# Patient Record
Sex: MALE | Race: WHITE | Employment: OTHER | URBAN - METROPOLITAN AREA
[De-identification: names, ages, dates, MRNs, and addresses within clinical notes are randomized per-mention and may not be internally consistent; named-entity substitution may affect disease eponyms.]

---

## 2017-03-06 ENCOUNTER — OFFICE VISIT (OUTPATIENT)
Dept: FAMILY MEDICINE CLINIC | Age: 62
End: 2017-03-06

## 2017-03-06 ENCOUNTER — HOSPITAL ENCOUNTER (OUTPATIENT)
Dept: LAB | Age: 62
Discharge: HOME | End: 2017-03-06
Payer: OTHER GOVERNMENT

## 2017-03-06 VITALS
HEIGHT: 72 IN | HEART RATE: 70 BPM | OXYGEN SATURATION: 99 % | WEIGHT: 209 LBS | RESPIRATION RATE: 16 BRPM | TEMPERATURE: 98.4 F | DIASTOLIC BLOOD PRESSURE: 82 MMHG | BODY MASS INDEX: 28.31 KG/M2 | SYSTOLIC BLOOD PRESSURE: 132 MMHG

## 2017-03-06 DIAGNOSIS — E11.9 DIABETES MELLITUS WITHOUT COMPLICATION (HCC): ICD-10-CM

## 2017-03-06 DIAGNOSIS — Z23 ENCOUNTER FOR IMMUNIZATION: ICD-10-CM

## 2017-03-06 DIAGNOSIS — Z13.6 SCREENING FOR CARDIOVASCULAR CONDITION: ICD-10-CM

## 2017-03-06 DIAGNOSIS — E11.9 DIABETES MELLITUS WITHOUT COMPLICATION (HCC): Primary | ICD-10-CM

## 2017-03-06 DIAGNOSIS — Z00.00 ROUTINE MEDICAL EXAM: Primary | ICD-10-CM

## 2017-03-06 LAB
ALT SERPL-CCNC: 25 U/L (ref 16–61)
ANION GAP BLD CALC-SCNC: 8 MMOL/L (ref 3–18)
AST SERPL W P-5'-P-CCNC: 14 U/L (ref 15–37)
BUN SERPL-MCNC: 15 MG/DL (ref 7–18)
BUN/CREAT SERPL: 16 (ref 12–20)
CALCIUM SERPL-MCNC: 9.3 MG/DL (ref 8.5–10.1)
CHLORIDE SERPL-SCNC: 102 MMOL/L (ref 100–108)
CHOLEST SERPL-MCNC: 135 MG/DL
CO2 SERPL-SCNC: 26 MMOL/L (ref 21–32)
CREAT SERPL-MCNC: 0.93 MG/DL (ref 0.6–1.3)
EST. AVERAGE GLUCOSE BLD GHB EST-MCNC: 163 MG/DL
GLUCOSE SERPL-MCNC: 136 MG/DL (ref 74–99)
HBA1C MFR BLD: 7.3 % (ref 4.2–5.6)
HDLC SERPL-MCNC: 44 MG/DL (ref 40–60)
HDLC SERPL: 3.1 {RATIO} (ref 0–5)
LDLC SERPL CALC-MCNC: 63.8 MG/DL (ref 0–100)
LIPID PROFILE,FLP: NORMAL
POTASSIUM SERPL-SCNC: 4.3 MMOL/L (ref 3.5–5.5)
SODIUM SERPL-SCNC: 136 MMOL/L (ref 136–145)
TRIGL SERPL-MCNC: 136 MG/DL (ref ?–150)
VLDLC SERPL CALC-MCNC: 27.2 MG/DL

## 2017-03-06 PROCEDURE — 83036 HEMOGLOBIN GLYCOSYLATED A1C: CPT | Performed by: FAMILY MEDICINE

## 2017-03-06 PROCEDURE — 36415 COLL VENOUS BLD VENIPUNCTURE: CPT | Performed by: FAMILY MEDICINE

## 2017-03-06 PROCEDURE — 80048 BASIC METABOLIC PNL TOTAL CA: CPT | Performed by: FAMILY MEDICINE

## 2017-03-06 PROCEDURE — 84460 ALANINE AMINO (ALT) (SGPT): CPT | Performed by: FAMILY MEDICINE

## 2017-03-06 PROCEDURE — 80061 LIPID PANEL: CPT | Performed by: FAMILY MEDICINE

## 2017-03-06 PROCEDURE — 82043 UR ALBUMIN QUANTITATIVE: CPT | Performed by: FAMILY MEDICINE

## 2017-03-06 PROCEDURE — 84450 TRANSFERASE (AST) (SGOT): CPT | Performed by: FAMILY MEDICINE

## 2017-03-06 RX ORDER — DIAZEPAM 5 MG/1
TABLET ORAL
Qty: 60 TAB | Refills: 1 | Status: SHIPPED | OUTPATIENT
Start: 2017-03-06 | End: 2018-11-06 | Stop reason: SDUPTHER

## 2017-03-06 RX ORDER — LISINOPRIL 5 MG/1
5 TABLET ORAL DAILY
Qty: 90 TAB | Refills: 3 | Status: SHIPPED | COMMUNITY
Start: 2017-03-06 | End: 2018-02-23 | Stop reason: SDUPTHER

## 2017-03-06 NOTE — PROGRESS NOTES
Subjective:     Kandy Sandhu is a 63 y.o. male presenting for annual exam and complete physical.  Pt is fasting today    Pt has been feeling well; He has lost 10 pounds since last visit. He has not been exercising. He has cut down on the breads; Blood sugars have been stable in am. 110- 120s mostly; Has not had a gout attack in awhile    Wt Readings from Last 3 Encounters:   03/06/17 209 lb (94.8 kg)   11/21/16 219 lb (99.3 kg)   08/22/16 225 lb (102.1 kg)         Lab Results   Component Value Date/Time    Hemoglobin A1c 7.5 11/21/2016 08:45 AM    Hemoglobin A1c (POC) 6.4 04/21/2015 08:53 AM         Patient Active Problem List   Diagnosis Code    Diverticulosis K57.90    BPH (benign prostatic hyperplasia) N40.0    Right shoulder pain M25.511    DM (diabetes mellitus) ([facility] E11.9    DDD (degenerative disc disease), lumbar M51.36    Pseudogout M11.20    Family history of prostate cancer in father Z80.41    Colon cancer screening Z12.11    History of colon polyps Z86.010    Rectal polyp K62.1     Current Outpatient Prescriptions   Medication Sig Dispense Refill    simvastatin (ZOCOR) 20 mg tablet TAKE 1 TABLET NIGHTLY 90 Tab 1    JANUVIA 100 mg tablet TAKE 1 TABLET DAILY 90 Tab 2    diazePAM (VALIUM) 5 mg tablet TAKE ONE TABLET BY MOUTH EVERY 12 HOURS AS NEEDED 60 Tab 1    lisinopril (PRINIVIL, ZESTRIL) 5 mg tablet Take 1 Tab by mouth daily. (Patient taking differently: Take 5 mg by mouth daily. Pt taking QOD) 90 Tab 3    metFORMIN (GLUCOPHAGE) 500 mg tablet TAKE 2 TABLETS IN THE MORNING AND 2 TABLETS IN THE EVENING 360 Tab 3    glucose blood VI test strips (ASCENSIA AUTODISC VI, ONE TOUCH ULTRA TEST VI) strip Blood sugar check daily 100 Strip 6    colchicine 0.6 mg tablet Take 2 tablets X 1 at onset of pain.   May repeat 1 tab in one hour prn pain X1 30 Tab 0     No Known Allergies  Past Medical History:   Diagnosis Date    BPH (benign prostatic hyperplasia) 12/9/2009    DDD (degenerative disc disease), lumbar 12/9/2009    Diverticulosis 12/9/2009    DM (diabetes mellitus) ([facility] [address] 12/9/2009    History of colon polyps 4/5/2016    Hypercholesterolemia     Pseudogout 12/9/2009    Renal cell carcinoma of left kidney ([facility] [address] 6/19/15    Pathological Stage [id number] pap RCC FG2 s/p left open partial nephrectomy in 6/15     Right shoulder pain 12/9/2009     Past Surgical History:   Procedure Laterality Date    ENDOSCOPY, COLON, DIAGNOSTIC  2000    sigmoidectomy    HX NEPHRECTOMY Left 6/19/15    Partial, [facility]    HX ORTHOPAEDIC  2005    left rotator cuff    HX OTHER SURGICAL  2007    hydrocele    HX TONSILLECTOMY  1978     Family History   Problem Relation Age of Onset    Diabetes Mother     Dementia Maternal Grandmother    Laureano Guilloryheim Brother         Lab Results  Component Value Date/Time   WBC 7.8 07/28/2015 07:45 AM   HGB 13.2 07/28/2015 07:45 AM   HCT 39.7 07/28/2015 07:45 AM   PLATELET 468 85/07/7327 07:45 AM   MCV 95 07/28/2015 07:45 AM       Lab Results  Component Value Date/Time   Cholesterol, total 135 11/21/2016 08:45 AM   HDL Cholesterol 51 11/21/2016 08:45 AM   LDL, calculated 60.2 11/21/2016 08:45 AM   Triglyceride 119 11/21/2016 08:45 AM   CHOL/HDL Ratio 2.6 11/21/2016 08:45 AM       Lab Results  Component Value Date/Time   ALT (SGPT) 28 11/21/2016 08:45 AM   AST (SGOT) 15 11/21/2016 08:45 AM   Alk.  phosphatase 71 02/20/2015 11:15 PM   Bilirubin, total 0.3 02/20/2015 11:15 PM       Lab Results  Component Value Date/Time   GFR est AA >60 11/21/2016 08:45 AM   GFR est non-AA >60 11/21/2016 08:45 AM   Creatinine (POC) 0.9 03/16/2015 07:23 AM   Creatinine 0.91 11/21/2016 08:45 AM   BUN 13 11/21/2016 08:45 AM   Sodium 141 11/21/2016 08:45 AM   Potassium 4.1 11/21/2016 08:45 AM   Chloride 106 11/21/2016 08:45 AM   CO2 26 11/21/2016 08:45 AM      Lab Results  Component Value Date/Time   Prostate Specific Ag 0.7 05/13/2010 08:36 AM        Review of Systems  A comprehensive review of systems was negative except for that written in the HPI. Objective:     Visit Vitals    /82 (BP 1 Location: Left arm, BP Patient Position: Sitting)    Pulse 70    Temp 98.4 °F (36.9 °C) (Oral)    Resp 16    Ht 6' (1.829 m)    Wt 209 lb (94.8 kg)    SpO2 99%    BMI 28.35 kg/m2     Physical exam:   General appearance - alert, well appearing, and in no distress  Ears - bilateral TM's and external ear canals normal  Nose - normal and patent, no erythema, discharge or polyps  Mouth - mucous membranes moist, pharynx normal without lesions  Neck - supple, no significant adenopathy  Lymphatics - no palpable lymphadenopathy, no hepatosplenomegaly  Chest - clear to auscultation, no wheezes, rales or rhonchi, symmetric air entry  Heart - normal rate, regular rhythm, normal S1, S2, no murmurs, rubs, clicks or gallops  Abdomen - soft, nontender, nondistended, no masses or organomegaly  Musculoskeletal - no joint tenderness, deformity or swelling  Extremities - no pedal edema noted     Assessment/Plan:     .  increase physical activity, follow low fat diet, follow low salt diet, continue present plan, routine labs ordered. ICD-10-CM ICD-9-CM    1. Routine medical exam Z00.00 V70.0    2. Encounter for immunization Z23 V03.89 PNEUMOCOCCAL CONJ VACCINE 13 VALENT IM   3. Diabetes mellitus without complication ([facility] [address] ) - for lab order only E11.9 250.00 ALT      AST      LIPID PANEL      METABOLIC PANEL, BASIC      HEMOGLOBIN A1C WITH EAG      MICROALBUMIN, UR, RAND W/ MICROALBUMIN/CREA RATIO   4. Screening for cardiovascular condition-  Z13.6 V81.2 LIPID PANEL      METABOLIC PANEL, BASIC   . As above, pt well and stable  Praised on weight loss efforts  Follow-up Disposition:  Return in about 4 months (around 7/6/2017) for chol/dm/htn. An After Visit Summary was printed and given to the patient. This has been fully explained to the patient, who indicates understanding.   Future labs to be placed in patients record per his request

## 2017-03-06 NOTE — PATIENT INSTRUCTIONS

## 2017-03-06 NOTE — PROGRESS NOTES
1. Have you been to the ER, urgent care clinic since your last visit? Hospitalized since your last visit? Yes Where: [facility]    2. Have you seen or consulted any other health care providers outside of the [address] [place] [address] since your last visit? Include any pap smears or colon screening.  [facility] PA with Dr. Ramesh Garcia, [address] [place] [address]

## 2017-03-06 NOTE — MR AVS SNAPSHOT
Visit Information Date & Time Provider Department Dept. Phone Encounter #  
 3/6/2017  9:20 AM Shoshana Islas, [address] [id number] Follow-up Instructions Return in about 4 months (around 7/6/2017) for chol/dm/htn. Upcoming Health Maintenance Date Due ZOSTER VACCINE AGE 60> 8/23/2017* HEMOGLOBIN A1C Q6M 5/21/2017 FOOT EXAM Q1 7/11/2017 MICROALBUMIN Q1 8/22/2017 EYE EXAM RETINAL OR DILATED Q1 11/1/2017 LIPID PANEL Q1 11/21/2017 Pneumococcal 19-64 Highest Risk (2 of 3 - PCV13) 3/6/2018 DTaP/Tdap/Td series (2 - Td) 3/15/2021 COLONOSCOPY 4/8/2026 *Topic was postponed. The date shown is not the original due date. Allergies as of 3/6/2017  Review Complete On: 3/6/2017 By: Shoshana Islas MD  
 No Known Allergies Current Immunizations  Reviewed on 3/6/2017 Name Date Pneumococcal Conjugate (PCV-13)  Incomplete TDAP Vaccine 3/15/2011 Reviewed by Shoshana Islas MD on 3/6/2017 at  9:35 AM  
You Were Diagnosed With   
  
 Codes Comments Routine medical exam    -  Primary ICD-10-CM: Z00.00 ICD-9-CM: V70.0 Encounter for immunization     ICD-10-CM: K37 ICD-9-CM: V03.89 Diabetes mellitus without complication ([facility]     JWT-90-LL: E11.9 ICD-9-CM: 250.00 Screening for cardiovascular condition     ICD-10-CM: Z13.6 ICD-9-CM: V81.2 Vitals BP Pulse Temp Resp Height(growth percentile) Weight(growth percentile) 132/82 (BP 1 Location: Left arm, BP Patient Position: Sitting) 70 98.4 °F (36.9 °C) (Oral) 16 6' (1.829 m) 209 lb (94.8 kg) SpO2 BMI Smoking Status 99% 28.35 kg/m2 Former Smoker BMI and BSA Data Body Mass Index Body Surface Area  
 28.35 kg/m 2 2.19 m 2 Preferred Pharmacy Pharmacy Name Phone [address] [facility] [phone] Your Updated Medication List  
  
   
 This list is accurate as of: 3/6/17  9:53 AM.  Always use your most recent med list.  
  
  
  
  
 colchicine 0.6 mg tablet Take 2 tablets X 1 at onset of pain. May repeat 1 tab in one hour prn pain X1  
  
 diazePAM 5 mg tablet Commonly known as:  VALIUM  
TAKE ONE TABLET BY MOUTH EVERY 12 HOURS AS NEEDED  
  
 glucose blood VI test strips strip Commonly known as:  ASCENSIA AUTODISC VI, ONE TOUCH ULTRA TEST VI Blood sugar check daily JANUVIA 100 mg tablet Generic drug:  SITagliptin TAKE 1 TABLET DAILY  
  
 lisinopril 5 mg tablet Commonly known as:  [place] Take 1 Tab by mouth daily. metFORMIN 500 mg tablet Commonly known as:  GLUCOPHAGE  
TAKE 2 TABLETS IN THE MORNING AND 2 TABLETS IN THE EVENING  
  
 simvastatin 20 mg tablet Commonly known as:  ZOCOR  
TAKE 1 TABLET NIGHTLY Prescriptions Printed Refills  
 diazePAM (VALIUM) 5 mg tablet 1 Sig: TAKE ONE TABLET BY MOUTH EVERY 12 HOURS AS NEEDED Class: Print Prescriptions Sent to Mail Order Refills  
 lisinopril (PRINIVIL, ZESTRIL) 5 mg tablet 3 Sig: Take 1 Tab by mouth daily. Class: Mail Order Pharmacy: [address], [address] Ph #: [phone] Route: Oral  
  
We Performed the Following PNEUMOCOCCAL CONJ VACCINE 13 VALENT IM [id number] CPT(R)] Follow-up Instructions Return in about 4 months (around 7/6/2017) for chol/dm/htn. To-Do List   
 03/06/2017 Lab:  ALT   
  
 03/06/2017 Lab:  AST   
  
 03/06/2017 Lab:  HEMOGLOBIN A1C WITH EAG   
  
 03/06/2017 Lab:  LIPID PANEL   
  
 03/06/2017 Lab:  METABOLIC PANEL, BASIC   
  
 03/06/2017 Lab:  MICROALBUMIN, UR, RAND W/ MICROALBUMIN/CREA RATIO Patient Instructions Well Visit, Men 48 to 72: Care Instructions Your Care Instructions Physical exams can help you stay healthy.  Your doctor has checked your overall health and may have suggested ways to take good care of yourself. He or she also may have recommended tests. At home, you can help prevent illness with healthy eating, regular exercise, and other steps. Follow-up care is a key part of your treatment and safety. Be sure to make and go to all appointments, and call your doctor if you are having problems. It's also a good idea to know your test results and keep a list of the medicines you take. How can you care for yourself at home? · Reach and stay at a healthy weight. This will lower your risk for many problems, such as obesity, diabetes, heart disease, and high blood pressure. · Get at least 30 minutes of exercise on most days of the week. Walking is a good choice. You also may want to do other activities, such as running, swimming, cycling, or playing tennis or team sports. · Do not smoke. Smoking can make health problems worse. If you need help quitting, talk to your doctor about stop-smoking programs and medicines. These can increase your chances of quitting for good. · Protect your skin from too much sun. When you're outdoors from 10 a.m. to 4 p.m., stay in the shade or cover up with clothing and a hat with a wide brim. Wear sunglasses that block UV rays. Even when it's cloudy, put broad-spectrum sunscreen (SPF 30 or higher) on any exposed skin. · See a dentist one or two times a year for checkups and to have your teeth cleaned. · Wear a seat belt in the car. · Limit alcohol to 2 drinks a day. Too much alcohol can cause health problems. Follow your doctor's advice about when to have certain tests. These tests can spot problems early. · Cholesterol. Your doctor will tell you how often to have this done based on your overall health and other things that can increase your risk for heart attack and stroke. · Blood pressure.  Have your blood pressure checked during a routine doctor visit. Your doctor will tell you how often to check your blood pressure based on your age, your blood pressure results, and other factors. · Prostate exam. Talk to your doctor about whether you should have a blood test (called a PSA test) for prostate cancer. Experts disagree on whether men should have this test. Some experts recommend that you discuss the benefits and risks of the test with your doctor. · Diabetes. Ask your doctor whether you should have tests for diabetes. · Vision. Some experts recommend that you have yearly exams for glaucoma and other age-related eye problems starting at age 48. · Hearing. Tell your doctor if you notice any change in your hearing. You can have tests to find out how well you hear. · Colon cancer. You should begin tests for colon cancer at age 48. You may have one of several tests. Your doctor will tell you how often to have tests based on your age and risk. Risks include whether you already had a precancerous polyp removed from your colon or whether your parent, brother, sister, or child has had colon cancer. · Heart attack and stroke risk. At least every 4 to 6 years, you should have your risk for heart attack and stroke assessed. Your doctor uses factors such as your age, blood pressure, cholesterol, and whether you smoke or have diabetes to show what your risk for a heart attack or stroke is over the next 10 years. · Abdominal aortic aneurysm. Ask your doctor whether you should have a test to check for an aneurysm. You may need a test if you ever smoked or if your parent, brother, sister, or child has had an aneurysm. When should you call for help? Watch closely for changes in your health, and be sure to contact your doctor if you have any problems or symptoms that concern you. Where can you learn more? Go to http://eddie-dion.info/. Enter D765 in the search box to learn more about \"Well Visit, Men 48 to 72: Care Instructions. \" 
 Current as of: July 19, 2016 Content Version: 11.1 © 6621-5124 [employer] [place]. Care instructions adapted under license by [employer] (which disclaims liability or warranty for this information). If you have questions about a medical condition or this instruction, always ask your healthcare professional. [address] any warranty or liability for your use of this information. Introducing [facility]! Dear Joanna Schumacher: Thank you for requesting a "[employer] account. Our records indicate that you already have an active "[employer] account. You can access your account anytime at [email or web address] Did you know that you can access your hospital and ER discharge instructions at any time in "[employer]? You can also review all of your test results from your hospital stay or ER visit. Additional Information If you have questions, please visit the Frequently Asked Questions section of the "[employer] website at [email or web address] Remember, "[employer] is NOT to be used for urgent needs. For medical emergencies, dial 911. Now available from your iPhone and Android! Please provide this summary of care documentation to your next provider. Your primary care clinician is listed as [address]. If you have any questions after today's visit, please call [phone].

## 2017-03-07 LAB
CREAT UR-MCNC: 142.61 MG/DL (ref 30–125)
MICROALBUMIN UR-MCNC: 10.3 MG/DL (ref 0–3)
MICROALBUMIN/CREAT UR-RTO: 72 MG/G (ref 0–30)

## 2017-04-17 RX ORDER — METFORMIN HYDROCHLORIDE 500 MG/1
TABLET ORAL
Qty: 360 TAB | Refills: 2 | Status: SHIPPED | OUTPATIENT
Start: 2017-04-17 | End: 2018-03-22 | Stop reason: SDUPTHER

## 2017-05-10 ENCOUNTER — OFFICE VISIT (OUTPATIENT)
Dept: FAMILY MEDICINE CLINIC | Age: 62
End: 2017-05-10

## 2017-05-10 VITALS
WEIGHT: 212 LBS | OXYGEN SATURATION: 98 % | TEMPERATURE: 98.2 F | HEIGHT: 72 IN | HEART RATE: 71 BPM | BODY MASS INDEX: 28.71 KG/M2 | DIASTOLIC BLOOD PRESSURE: 78 MMHG | SYSTOLIC BLOOD PRESSURE: 134 MMHG | RESPIRATION RATE: 16 BRPM

## 2017-05-10 DIAGNOSIS — R10.84 GENERALIZED ABDOMINAL PAIN: Primary | ICD-10-CM

## 2017-05-10 LAB
BILIRUB UR QL STRIP: NEGATIVE
GLUCOSE UR-MCNC: NEGATIVE MG/DL
KETONES P FAST UR STRIP-MCNC: NEGATIVE MG/DL
PH UR STRIP: 7 [PH] (ref 4.6–8)
PROT UR QL STRIP: NORMAL MG/DL
SP GR UR STRIP: 1.02 (ref 1–1.03)
UA UROBILINOGEN AMB POC: NORMAL (ref 0.2–1)
URINALYSIS CLARITY POC: CLEAR
URINALYSIS COLOR POC: NORMAL
URINE BLOOD POC: NEGATIVE
URINE LEUKOCYTES POC: NEGATIVE
URINE NITRITES POC: NEGATIVE

## 2017-05-10 RX ORDER — AMOXICILLIN AND CLAVULANATE POTASSIUM 875; 125 MG/1; MG/1
1 TABLET, FILM COATED ORAL EVERY 12 HOURS
Qty: 20 TAB | Refills: 0 | Status: SHIPPED | OUTPATIENT
Start: 2017-05-10 | End: 2017-05-20

## 2017-05-10 RX ORDER — PHENOL/SODIUM PHENOLATE
20 AEROSOL, SPRAY (ML) MUCOUS MEMBRANE DAILY
Qty: 30 TAB | Refills: 0 | Status: SHIPPED | OUTPATIENT
Start: 2017-05-10 | End: 2017-08-02 | Stop reason: ALTCHOICE

## 2017-05-10 NOTE — PROGRESS NOTES
HISTORY OF PRESENT ILLNESS  Oleta Boast is a 63 y.o. male. HPI  Patient is here today for evaluation and treatment of: Abdominal Pain    Abdominal Pain: states Sunday night pt began getting some crampy, abdominal pain and later some fecal urgency with diarrhea , vomiting,  dry heaving. Awakened the next day and sx got better at noon. The last two days have been stable up until last night. Last night he developed lower abdominal pain; with severe cramps. Pt had eaten cashews and triscuits before going to bed. Has not had any blood in stool; No diarrhea last night. No NSAIDS. Denies any fever. Has a h/o diverticulosis; Had a portion of his colon removed from the diverticulosis. He ate chipotle Saturday night. Ate about a cupful of the same a few days later. States pain seems to radiate down into the testicles as well. PMH,  Meds, Allergies, Family History, Social history reviewed      Current Outpatient Prescriptions:     metFORMIN (GLUCOPHAGE) 500 mg tablet, TAKE 2 TABLETS IN THE MORNING AND 2 TABLETS IN THE EVENING, Disp: 360 Tab, Rfl: 2    diazePAM (VALIUM) 5 mg tablet, TAKE ONE TABLET BY MOUTH EVERY 12 HOURS AS NEEDED, Disp: 60 Tab, Rfl: 1    lisinopril (PRINIVIL, ZESTRIL) 5 mg tablet, Take 1 Tab by mouth daily. , Disp: 90 Tab, Rfl: 3    simvastatin (ZOCOR) 20 mg tablet, TAKE 1 TABLET NIGHTLY, Disp: 90 Tab, Rfl: 1    JANUVIA 100 mg tablet, TAKE 1 TABLET DAILY, Disp: 90 Tab, Rfl: 2    glucose blood VI test strips (ASCENSIA AUTODISC VI, ONE TOUCH ULTRA TEST VI) strip, Blood sugar check daily, Disp: 100 Strip, Rfl: 6    colchicine 0.6 mg tablet, Take 2 tablets X 1 at onset of pain. May repeat 1 tab in one hour prn pain X1, Disp: 30 Tab, Rfl: 0    PMH,  Meds, Allergies, Family History, Social history reviewed    Review of Systems   Constitutional: Negative for chills and fever. Gastrointestinal: Positive for abdominal pain, diarrhea, nausea (when he vomits) and vomiting.  Negative for blood in stool, constipation and heartburn. Genitourinary: Negative for dysuria and urgency. Physical Exam   Constitutional: He appears well-developed and well-nourished. No distress. Cardiovascular: Normal rate and regular rhythm. Exam reveals no gallop and no friction rub. No murmur heard. Pulmonary/Chest: Breath sounds normal. No respiratory distress. He has no wheezes. He has no rales. Abdominal: Bowel sounds are normal. He exhibits no distension and no mass. There is tenderness (diffusely with more tenderness noted in left mid abdomen and mid epigastrium). There is no rebound and no guarding. Nursing note and vitals reviewed. (with Nurse standby):  No testicular TTP  Visit Vitals    /78 (BP 1 Location: Left arm, BP Patient Position: Sitting)    Pulse 71    Temp 98.2 °F (36.8 °C) (Oral)    Resp 16    Ht 6' (1.829 m)    Wt 212 lb (96.2 kg)    SpO2 98%    BMI 28.75 kg/m2       UA no nitrites, no jaun    ASSESSMENT and PLAN    ICD-10-CM ICD-9-CM    1. Generalized abdominal pain R10.84 789.07 AMB POC URINALYSIS DIP STICK AUTO W/ MICRO       As above, new? Etiology- ? Diverticular in origin vs food related   treatment plan as listed below  Orders Placed This Encounter    AMB POC URINALYSIS DIP STICK AUTO W/ MICRO    Omeprazole delayed release (PRILOSEC D/R) 20 mg tablet    amoxicillin-clavulanate (AUGMENTIN) 875-125 mg per tablet     [place] diet; consider not eating anymore of his leftover chipotle  Plenty of liquids  Follow-up Disposition:  Return July as scheduled. Return sooner if sx do not improve  An After Visit Summary was printed and given to the patient. This has been fully explained to the patient, who indicates understanding.

## 2017-05-10 NOTE — MR AVS SNAPSHOT
Visit Information Date & Time Provider Department Dept. Phone Encounter #  
 5/10/2017  9:40 AM Brendan Walton  [address] [id number] Your Appointments 7/6/2017  8:20 AM  
ROUTINE CARE with Brendan Walton MD  
[address] ([facility]) Appt Note: 4m fu chol/ dm  
 [address] [address] 2 Leobardo Ajo [address] [address] Upcoming Health Maintenance Date Due Pneumococcal 19-64 Medium Risk (1 of 1 - PPSV23) 2/3/1976 ZOSTER VACCINE AGE 60> 8/23/2017* FOOT EXAM Q1 7/11/2017 INFLUENZA AGE 9 TO ADULT 8/1/2017 HEMOGLOBIN A1C Q6M 9/6/2017 EYE EXAM RETINAL OR DILATED Q1 11/1/2017 MICROALBUMIN Q1 3/6/2018 LIPID PANEL Q1 3/6/2018 DTaP/Tdap/Td series (2 - Td) 3/15/2021 COLONOSCOPY 4/8/2026 *Topic was postponed. The date shown is not the original due date. Allergies as of 5/10/2017  Review Complete On: 5/10/2017 By: Yadi Macias LPN No Known Allergies Current Immunizations  Reviewed on 3/6/2017 Name Date Pneumococcal Conjugate (PCV-13) 3/6/2017 TDAP Vaccine 3/15/2011 Not reviewed this visit You Were Diagnosed With   
  
 Codes Comments Generalized abdominal pain    -  Primary ICD-10-CM: R10.84 ICD-9-CM: 789.07 Vitals BP Pulse Temp Resp Height(growth percentile) Weight(growth percentile) 134/78 (BP 1 Location: Left arm, BP Patient Position: Sitting) 71 98.2 °F (36.8 °C) (Oral) 16 6' (1.829 m) 212 lb (96.2 kg) SpO2 BMI Smoking Status 98% 28.75 kg/m2 Former Smoker BMI and BSA Data Body Mass Index Body Surface Area 28.75 kg/m 2 2.21 m 2 Preferred Pharmacy Pharmacy Name Phone [facility] [address], [address] [phone] Your Updated Medication List  
  
   
 This list is accurate as of: 5/10/17 11:07 AM.  Always use your most recent med list.  
  
  
  
  
 amoxicillin-clavulanate 875-125 mg per tablet Commonly known as:  AUGMENTIN Take 1 Tab by mouth every twelve (12) hours for 10 days. colchicine 0.6 mg tablet Take 2 tablets X 1 at onset of pain. May repeat 1 tab in one hour prn pain X1  
  
 diazePAM 5 mg tablet Commonly known as:  VALIUM  
TAKE ONE TABLET BY MOUTH EVERY 12 HOURS AS NEEDED  
  
 glucose blood VI test strips strip Commonly known as:  ASCENSIA AUTODISC VI, ONE TOUCH ULTRA TEST VI Blood sugar check daily JANUVIA 100 mg tablet Generic drug:  SITagliptin TAKE 1 TABLET DAILY  
  
 lisinopril 5 mg tablet Commonly known as:  Fiordaliza Humphrey Take 1 Tab by mouth daily. metFORMIN 500 mg tablet Commonly known as:  GLUCOPHAGE  
TAKE 2 TABLETS IN THE MORNING AND 2 TABLETS IN THE EVENING Omeprazole delayed release 20 mg tablet Commonly known as:  PRILOSEC D/R Take 1 Tab by mouth daily. simvastatin 20 mg tablet Commonly known as:  ZOCOR  
TAKE 1 TABLET NIGHTLY Prescriptions Sent to Pharmacy Refills Omeprazole delayed release (PRILOSEC D/R) 20 mg tablet 0 Sig: Take 1 Tab by mouth daily. Class: Normal  
 Pharmacy: [address] Ph #: [phone] Route: Oral  
 amoxicillin-clavulanate (AUGMENTIN) 875-125 mg per tablet 0 Sig: Take 1 Tab by mouth every twelve (12) hours for 10 days. Class: Normal  
 Pharmacy: [address] Ph #: [phone] Route: Oral  
  
We Performed the Following AMB POC URINALYSIS DIP STICK AUTO W/ MICRO [46314 CPT(R)] Patient Instructions Abdominal Pain: Care Instructions Your Care Instructions Abdominal pain has many possible causes.  Some aren't serious and get better on their own in a few days. Others need more testing and treatment. If your pain continues or gets worse, you need to be rechecked and may need more tests to find out what is wrong. You may need surgery to correct the problem. Don't ignore new symptoms, such as fever, nausea and vomiting, urination problems, pain that gets worse, and dizziness. These may be signs of a more serious problem. Your doctor may have recommended a follow-up visit in the next 8 to 12 hours. If you are not getting better, you may need more tests or treatment. The doctor has checked you carefully, but problems can develop later. If you notice any problems or new symptoms, get medical treatment right away. Follow-up care is a key part of your treatment and safety. Be sure to make and go to all appointments, and call your doctor if you are having problems. It's also a good idea to know your test results and keep a list of the medicines you take. How can you care for yourself at home? · Rest until you feel better. · To prevent dehydration, drink plenty of fluids, enough so that your urine is light yellow or clear like water. Choose water and other caffeine-free clear liquids until you feel better. If you have kidney, heart, or liver disease and have to limit fluids, talk with your doctor before you increase the amount of fluids you drink. · If your stomach is upset, eat mild foods, such as rice, dry toast or crackers, bananas, and applesauce. Try eating several small meals instead of two or three large ones. · Wait until 48 hours after all symptoms have gone away before you have spicy foods, alcohol, and drinks that contain caffeine. · Do not eat foods that are high in fat. · Avoid anti-inflammatory medicines such as aspirin, ibuprofen (Advil, Motrin), and naproxen (Aleve). These can cause stomach upset. Talk to your doctor if you take daily aspirin for another health problem. When should you call for help? Call 911 anytime you think you may need emergency care. For example, call if: 
· You passed out (lost consciousness). · You pass maroon or very bloody stools. · You vomit blood or what looks like coffee grounds. · You have new, severe belly pain. Call your doctor now or seek immediate medical care if: 
· Your pain gets worse, especially if it becomes focused in one area of your belly. · You have a new or higher fever. · Your stools are black and look like tar, or they have streaks of blood. · You have unexpected vaginal bleeding. · You have symptoms of a urinary tract infection. These may include: 
¨ Pain when you urinate. ¨ Urinating more often than usual. 
¨ Blood in your urine. · You are dizzy or lightheaded, or you feel like you may faint. Watch closely for changes in your health, and be sure to contact your doctor if: 
· You are not getting better after 1 day (24 hours). Where can you learn more? Go to http://eddie-dion.info/. Enter W652 in the search box to learn more about \"Abdominal Pain: Care Instructions. \" Current as of: May 27, 2016 Content Version: 11.2 © 7436-9690 iQiyi. Care instructions adapted under license by [employer] (which disclaims liability or warranty for this information). If you have questions about a medical condition or this instruction, always ask your healthcare professional. [place] [address] any warranty or liability for your use of this information. Introducing [facility] & HEALTH SERVICES! Dear Omar Guzman: Thank you for requesting a CardiOx account. Our records indicate that you already have an active CardiOx account. You can access your account anytime at [email or web address] Did you know that you can access your hospital and ER discharge instructions at any time in CardiOx? You can also review all of your test results from your hospital stay or ER visit. Additional Information If you have questions, please visit the Frequently Asked Questions section of the OnApphart website at [email or web address] Remember, LearnSprout is NOT to be used for urgent needs. For medical emergencies, dial 911. Now available from your iPhone and Android! Please provide this summary of care documentation to your next provider. Your primary care clinician is listed as [address] [facility]. If you have any questions after today's visit, please call [phone].

## 2017-05-10 NOTE — PROGRESS NOTES
1. Have you been to the ER, urgent care clinic since your last visit? Hospitalized since your last visit? No    2. Have you seen or consulted any other health care providers outside of the [address] [place] [address] since your last visit? Include any pap smears or colon screening.  No

## 2017-05-10 NOTE — PATIENT INSTRUCTIONS

## 2017-06-12 ENCOUNTER — HOSPITAL ENCOUNTER (OUTPATIENT)
Dept: CT IMAGING | Age: 62
Discharge: HOME | End: 2017-06-12
Attending: UROLOGY
Payer: OTHER GOVERNMENT

## 2017-06-12 ENCOUNTER — HOSPITAL ENCOUNTER (OUTPATIENT)
Dept: LAB | Age: 62
Discharge: HOME | End: 2017-06-12
Payer: OTHER GOVERNMENT

## 2017-06-12 DIAGNOSIS — C64.9 RENAL CANCER, UNSPECIFIED LATERALITY (HCC): ICD-10-CM

## 2017-06-12 LAB
ANION GAP BLD CALC-SCNC: 8 MMOL/L (ref 3–18)
BUN SERPL-MCNC: 17 MG/DL (ref 7–18)
BUN/CREAT SERPL: 19 (ref 12–20)
CALCIUM SERPL-MCNC: 9.2 MG/DL (ref 8.5–10.1)
CHLORIDE SERPL-SCNC: 102 MMOL/L (ref 100–108)
CO2 SERPL-SCNC: 28 MMOL/L (ref 21–32)
CREAT SERPL-MCNC: 0.89 MG/DL (ref 0.6–1.3)
CREAT UR-MCNC: 0.9 MG/DL (ref 0.6–1.3)
ERYTHROCYTE [DISTWIDTH] IN BLOOD BY AUTOMATED COUNT: 11.8 % (ref 11.6–14.5)
GLUCOSE SERPL-MCNC: 170 MG/DL (ref 74–99)
HCT VFR BLD AUTO: 40.6 % (ref 36–48)
HGB BLD-MCNC: 14 G/DL (ref 13–16)
MCH RBC QN AUTO: 31.3 PG (ref 24–34)
MCHC RBC AUTO-ENTMCNC: 34.5 G/DL (ref 31–37)
MCV RBC AUTO: 90.6 FL (ref 74–97)
PLATELET # BLD AUTO: 312 K/UL (ref 135–420)
PMV BLD AUTO: 11 FL (ref 9.2–11.8)
POTASSIUM SERPL-SCNC: 4.6 MMOL/L (ref 3.5–5.5)
RBC # BLD AUTO: 4.48 M/UL (ref 4.7–5.5)
SODIUM SERPL-SCNC: 138 MMOL/L (ref 136–145)
WBC # BLD AUTO: 8.1 K/UL (ref 4.6–13.2)

## 2017-06-12 PROCEDURE — 74178 CT ABD&PLV WO CNTR FLWD CNTR: CPT

## 2017-06-12 PROCEDURE — 74011636320 HC RX REV CODE- 636/320: Performed by: UROLOGY

## 2017-06-12 PROCEDURE — 82565 ASSAY OF CREATININE: CPT

## 2017-06-12 RX ADMIN — IOPAMIDOL 100 ML: 612 INJECTION, SOLUTION INTRAVENOUS at 12:00

## 2017-07-06 ENCOUNTER — HOSPITAL ENCOUNTER (OUTPATIENT)
Dept: LAB | Age: 62
Discharge: HOME | End: 2017-07-06
Payer: OTHER GOVERNMENT

## 2017-07-06 ENCOUNTER — OFFICE VISIT (OUTPATIENT)
Dept: FAMILY MEDICINE CLINIC | Age: 62
End: 2017-07-06

## 2017-07-06 VITALS
HEART RATE: 72 BPM | DIASTOLIC BLOOD PRESSURE: 78 MMHG | BODY MASS INDEX: 29.53 KG/M2 | TEMPERATURE: 98.5 F | HEIGHT: 72 IN | OXYGEN SATURATION: 98 % | RESPIRATION RATE: 12 BRPM | WEIGHT: 218 LBS | SYSTOLIC BLOOD PRESSURE: 120 MMHG

## 2017-07-06 DIAGNOSIS — R80.9 MICROALBUMINURIA: ICD-10-CM

## 2017-07-06 DIAGNOSIS — E78.00 HYPERCHOLESTEROLEMIA: ICD-10-CM

## 2017-07-06 DIAGNOSIS — E11.9 DIABETES MELLITUS WITHOUT COMPLICATION (HCC): ICD-10-CM

## 2017-07-06 DIAGNOSIS — I10 ESSENTIAL HYPERTENSION: Primary | ICD-10-CM

## 2017-07-06 DIAGNOSIS — I10 ESSENTIAL HYPERTENSION: ICD-10-CM

## 2017-07-06 LAB
ALT SERPL-CCNC: 25 U/L (ref 16–61)
ANION GAP BLD CALC-SCNC: 7 MMOL/L (ref 3–18)
AST SERPL W P-5'-P-CCNC: 12 U/L (ref 15–37)
BUN SERPL-MCNC: 16 MG/DL (ref 7–18)
BUN/CREAT SERPL: 16 (ref 12–20)
CALCIUM SERPL-MCNC: 9.7 MG/DL (ref 8.5–10.1)
CHLORIDE SERPL-SCNC: 104 MMOL/L (ref 100–108)
CHOLEST SERPL-MCNC: 155 MG/DL
CO2 SERPL-SCNC: 27 MMOL/L (ref 21–32)
CREAT SERPL-MCNC: 1 MG/DL (ref 0.6–1.3)
EST. AVERAGE GLUCOSE BLD GHB EST-MCNC: 180 MG/DL
GLUCOSE SERPL-MCNC: 170 MG/DL (ref 74–99)
HBA1C MFR BLD: 7.9 % (ref 4.2–5.6)
HDLC SERPL-MCNC: 46 MG/DL (ref 40–60)
HDLC SERPL: 3.4 {RATIO} (ref 0–5)
LDLC SERPL CALC-MCNC: 74 MG/DL (ref 0–100)
LIPID PROFILE,FLP: ABNORMAL
POTASSIUM SERPL-SCNC: 4.7 MMOL/L (ref 3.5–5.5)
SODIUM SERPL-SCNC: 138 MMOL/L (ref 136–145)
TRIGL SERPL-MCNC: 175 MG/DL (ref ?–150)
VLDLC SERPL CALC-MCNC: 35 MG/DL

## 2017-07-06 PROCEDURE — 36415 COLL VENOUS BLD VENIPUNCTURE: CPT | Performed by: FAMILY MEDICINE

## 2017-07-06 PROCEDURE — 83036 HEMOGLOBIN GLYCOSYLATED A1C: CPT | Performed by: FAMILY MEDICINE

## 2017-07-06 PROCEDURE — 84460 ALANINE AMINO (ALT) (SGPT): CPT | Performed by: FAMILY MEDICINE

## 2017-07-06 PROCEDURE — 84450 TRANSFERASE (AST) (SGOT): CPT | Performed by: FAMILY MEDICINE

## 2017-07-06 PROCEDURE — 82043 UR ALBUMIN QUANTITATIVE: CPT | Performed by: FAMILY MEDICINE

## 2017-07-06 PROCEDURE — 80048 BASIC METABOLIC PNL TOTAL CA: CPT | Performed by: FAMILY MEDICINE

## 2017-07-06 PROCEDURE — 80061 LIPID PANEL: CPT | Performed by: FAMILY MEDICINE

## 2017-07-06 NOTE — PROGRESS NOTES
HISTORY OF PRESENT ILLNESS  Yadi Weathers is a 63 y.o. male. HPI  Patient is here today for evaluation and treatment of: Diabetes/Hypertension/Cholesterol problem    Diabetes:  Pt is on meds as listed below; blood sugars at home have been moderately controlled; Has some occassional dietary indiscretions    Hypertension:  BP is stable today , he is on meds as listed below; he takes med daily. Cholesterol:  Pt is on zocor; tolerates med well; he is fasting today. Did not get previous labs ordered;        Current Outpatient Prescriptions:     SITagliptin (JANUVIA) 100 mg tablet, TAKE 1 TABLET DAILY, Disp: 90 Tab, Rfl: 3    metFORMIN (GLUCOPHAGE) 500 mg tablet, TAKE 2 TABLETS IN THE MORNING AND 2 TABLETS IN THE EVENING, Disp: 360 Tab, Rfl: 2    diazePAM (VALIUM) 5 mg tablet, TAKE ONE TABLET BY MOUTH EVERY 12 HOURS AS NEEDED, Disp: 60 Tab, Rfl: 1    lisinopril (PRINIVIL, ZESTRIL) 5 mg tablet, Take 1 Tab by mouth daily. , Disp: 90 Tab, Rfl: 3    simvastatin (ZOCOR) 20 mg tablet, TAKE 1 TABLET NIGHTLY, Disp: 90 Tab, Rfl: 1    glucose blood VI test strips (ASCENSIA AUTODISC VI, ONE TOUCH ULTRA TEST VI) strip, Blood sugar check daily, Disp: 100 Strip, Rfl: 6    colchicine 0.6 mg tablet, Take 2 tablets X 1 at onset of pain. May repeat 1 tab in one hour prn pain X1, Disp: 30 Tab, Rfl: 0    Omeprazole delayed release (PRILOSEC D/R) 20 mg tablet, Take 1 Tab by mouth daily. , Disp: 30 Tab, Rfl: 0    PMH,  Meds, Allergies, Family History, Social history reviewed    Review of Systems   Constitutional: Negative for chills and fever. Cardiovascular: Negative for chest pain and leg swelling. Musculoskeletal: Positive for back pain (previously; due to playing soccer recently). Physical Exam   Constitutional: He appears well-developed and well-nourished. No distress. Cardiovascular: Normal rate and regular rhythm. Exam reveals no gallop and no friction rub. No murmur heard.   Pulmonary/Chest: Breath sounds normal. No respiratory distress. He has no wheezes. He has no rales. Musculoskeletal: He exhibits no edema. Nursing note and vitals reviewed. Sensory exam of the foot is normal, tested with the monofilament. Good pulses, no lesions or ulcers, good peripheral pulses. Lab Results   Component Value Date/Time    Hemoglobin A1c 7.3 03/06/2017 09:57 AM    Hemoglobin A1c (POC) 6.4 04/21/2015 08:53 AM       ASSESSMENT and PLAN    ICD-10-CM ICD-9-CM    1. Essential hypertension Z99 767.9 METABOLIC PANEL, BASIC   2. Diabetes mellitus without complication (HCC) S72.0 250.00 HM DIABETES FOOT EXAM      HEMOGLOBIN A1C WITH EAG   3. Hypercholesterolemia E78.00 272.0 ALT      AST      LIPID PANEL   4. Microalbuminuria R80.9 791.0 MICROALBUMIN, UR, RAND W/ MICROALBUMIN/CREA RATIO       As above,   above all stable unless otherwise noted  Labs as ordered for prior to next visit  Continue current meds as ordered  Follow-up Disposition:  Return in about 4 months (around 11/6/2017) for htn/dm/chol. An After Visit Summary was printed and given to the patient. This has been fully explained to the patient, who indicates understanding.

## 2017-07-06 NOTE — PROGRESS NOTES
1. Have you been to the ER, urgent care clinic since your last visit? Hospitalized since your last visit? No    2. Have you seen or consulted any other health care providers outside of the [address] [place] [address] since your last visit? Include any pap smears or colon screening.  No

## 2017-07-06 NOTE — MR AVS SNAPSHOT
Visit Information Date & Time Provider Department Dept. Phone Encounter #  
 7/6/2017  8:20 AM Risa Wiggins MD [facility] [id number] Follow-up Instructions Return in about 4 months (around 11/6/2017) for htn/dm/chol. Your Appointments 8/2/2017 10:00 AM  
CONSULT with Azael Steele MD  
[facility] ([facility]) Appt Note: r/s from 6/22 due to no ref. [address] [facility] [address]  
[address] [facility] [address] [facility] [address] [facility] [address] Upcoming Health Maintenance Date Due ZOSTER VACCINE AGE 60> 8/23/2017* INFLUENZA AGE 9 TO ADULT 8/1/2017 HEMOGLOBIN A1C Q6M 9/6/2017 EYE EXAM RETINAL OR DILATED Q1 11/1/2017 MICROALBUMIN Q1 3/6/2018 LIPID PANEL Q1 3/6/2018 FOOT EXAM Q1 7/6/2018 DTaP/Tdap/Td series (2 - Td) 3/15/2021 Pneumococcal 19-64 Highest Risk (3 of 3 - PPSV23) 7/6/2022 COLONOSCOPY 4/8/2026 *Topic was postponed. The date shown is not the original due date. Allergies as of 7/6/2017  Review Complete On: 7/6/2017 By: Evelyne Yin LPN No Known Allergies Current Immunizations  Reviewed on 3/6/2017 Name Date Pneumococcal Conjugate (PCV-13) 3/6/2017 TDAP Vaccine 3/15/2011 Not reviewed this visit You Were Diagnosed With   
  
 Codes Comments Essential hypertension    -  Primary ICD-10-CM: I10 
ICD-9-CM: 401.9 Diabetes mellitus without complication ([facility]     BLO-17-AK: E11.9 ICD-9-CM: 250.00 Hypercholesterolemia     ICD-10-CM: E78.00 ICD-9-CM: 272.0 Microalbuminuria     ICD-10-CM: R80.9 ICD-9-CM: 791.0 Vitals BP Pulse Temp Resp Height(growth percentile) Weight(growth percentile) 120/78 (BP 1 Location: Left arm, BP Patient Position: Sitting) 72 98.5 °F (36.9 °C) (Oral) 12 6' (1.829 m) 218 lb (98.9 kg) SpO2 BMI Smoking Status 98% 29.57 kg/m2 Former Smoker BMI and BSA Data Body Mass Index Body Surface Area  
 29.57 kg/m 2 2.24 m 2 Preferred Pharmacy Pharmacy Name Phone [address] [phone] Your Updated Medication List  
  
   
This list is accurate as of: 7/6/17  8:56 AM.  Always use your most recent med list.  
  
  
  
  
 colchicine 0.6 mg tablet Take 2 tablets X 1 at onset of pain. May repeat 1 tab in one hour prn pain X1  
  
 diazePAM 5 mg tablet Commonly known as:  VALIUM  
TAKE ONE TABLET BY MOUTH EVERY 12 HOURS AS NEEDED  
  
 glucose blood VI test strips strip Commonly known as:  ASCENSIA AUTODISC VI, ONE TOUCH ULTRA TEST VI Blood sugar check daily  
  
 lisinopril 5 mg tablet Commonly known as:  Aracely Irizarry Take 1 Tab by mouth daily. metFORMIN 500 mg tablet Commonly known as:  GLUCOPHAGE  
TAKE 2 TABLETS IN THE MORNING AND 2 TABLETS IN THE EVENING Omeprazole delayed release 20 mg tablet Commonly known as:  PRILOSEC D/R Take 1 Tab by mouth daily. simvastatin 20 mg tablet Commonly known as:  ZOCOR  
TAKE 1 TABLET NIGHTLY SITagliptin 100 mg tablet Commonly known as:  Bird Taylor TAKE 1 TABLET DAILY Prescriptions Sent to Pharmacy Refills SITagliptin (JANUVIA) 100 mg tablet 3 Sig: TAKE 1 TABLET DAILY Class: Normal  
 Pharmacy: [address], [address] [place] [address] #: [phone] We Performed the Following  DIABETES FOOT EXAM [[facility]] Follow-up Instructions Return in about 4 months (around 11/6/2017) for htn/dm/chol. To-Do List   
 07/06/2017 Lab:  MICROALBUMIN, UR, RAND W/ MICROALBUMIN/CREA RATIO Patient Instructions Learning About Diabetes Food Guidelines Your Care Instructions Meal planning is important to manage diabetes.  It helps keep your blood sugar at a target level (which you set with your doctor). You don't have to eat special foods. You can eat what your family eats, including sweets once in a while. But you do have to pay attention to how often you eat and how much you eat of certain foods. You may want to work with a dietitian or a certified diabetes educator (CDE) to help you plan meals and snacks. A dietitian or CDE can also help you lose weight if that is one of your goals. What should you know about eating carbs? Managing the amount of carbohydrate (carbs) you eat is an important part of healthy meals when you have diabetes. Carbohydrate is found in many foods. · Learn which foods have carbs. And learn the amounts of carbs in different foods. ¨ Bread, cereal, pasta, and rice have about 15 grams of carbs in a serving. A serving is 1 slice of bread (1 ounce), ½ cup of cooked cereal, or 1/3 cup of cooked pasta or rice. ¨ Fruits have 15 grams of carbs in a serving. A serving is 1 small fresh fruit, such as an apple or orange; ½ of a banana; ½ cup of cooked or canned fruit; ½ cup of fruit juice; 1 cup of melon or raspberries; or 2 tablespoons of dried fruit. ¨ Milk and no-sugar-added yogurt have 15 grams of carbs in a serving. A serving is 1 cup of milk or 2/3 cup of no-sugar-added yogurt. ¨ Starchy vegetables have 15 grams of carbs in a serving. A serving is ½ cup of mashed potatoes or sweet potato; 1 cup winter squash; ½ of a small baked potato; ½ cup of cooked beans; or ½ cup cooked corn or green peas. · Learn how much carbs to eat each day and at each meal. A dietitian or CDE can teach you how to keep track of the amount of carbs you eat. This is called carbohydrate counting. · If you are not sure how to count carbohydrate grams, use the Plate Method to plan meals. It is a good, quick way to make sure that you have a balanced meal. It also helps you spread carbs throughout the day. ¨ Divide your plate by types of foods. Put non-starchy vegetables on half the plate, meat or other protein food on one-quarter of the plate, and a grain or starchy vegetable in the final quarter of the plate. To this you can add a small piece of fruit and 1 cup of milk or yogurt, depending on how many carbs you are supposed to eat at a meal. 
· Try to eat about the same amount of carbs at each meal. Do not \"save up\" your daily allowance of carbs to eat at one meal. 
· Proteins have very little or no carbs per serving. Examples of proteins are beef, chicken, turkey, fish, eggs, tofu, cheese, cottage cheese, and peanut butter. A serving size of meat is 3 ounces, which is about the size of a deck of cards. Examples of meat substitute serving sizes (equal to 1 ounce of meat) are 1/4 cup of cottage cheese, 1 egg, 1 tablespoon of peanut butter, and ½ cup of tofu. How can you eat out and still eat healthy? · Learn to estimate the serving sizes of foods that have carbohydrate. If you measure food at home, it will be easier to estimate the amount in a serving of restaurant food. · If the meal you order has too much carbohydrate (such as potatoes, corn, or baked beans), ask to have a low-carbohydrate food instead. Ask for a salad or green vegetables. · If you use insulin, check your blood sugar before and after eating out to help you plan how much to eat in the future. · If you eat more carbohydrate at a meal than you had planned, take a walk or do other exercise. This will help lower your blood sugar. What else should you know? · Limit saturated fat, such as the fat from meat and dairy products. This is a healthy choice because people who have diabetes are at higher risk of heart disease. So choose lean cuts of meat and nonfat or low-fat dairy products. Use olive or canola oil instead of butter or shortening when cooking. · Don't skip meals.  Your blood sugar may drop too low if you skip meals and take insulin or certain medicines for diabetes. · Check with your doctor before you drink alcohol. Alcohol can cause your blood sugar to drop too low. Alcohol can also cause a bad reaction if you take certain diabetes medicines. Follow-up care is a key part of your treatment and safety. Be sure to make and go to all appointments, and call your doctor if you are having problems. It's also a good idea to know your test results and keep a list of the medicines you take. Where can you learn more? Go to http://eddie-dion.info/. Enter J363 in the search box to learn more about \"Learning About Diabetes Food Guidelines. \" Current as of: March 13, 2017 Content Version: 11.3 © 5879-1904 Relux. Care instructions adapted under license by [employer] (which disclaims liability or warranty for this information). If you have questions about a medical condition or this instruction, always ask your healthcare professional. [place] [address] any warranty or liability for your use of this information. Introducing [facility] & HEALTH SERVICES! Dear Erika Jarrell: Thank you for requesting a Dizko Samurai account. Our records indicate that you already have an active Dizko Samurai account. You can access your account anytime at [email or web address] Did you know that you can access your hospital and ER discharge instructions at any time in Dizko Samurai? You can also review all of your test results from your hospital stay or ER visit. Additional Information If you have questions, please visit the Frequently Asked Questions section of the Dizko Samurai website at [email or web address] Remember, Dizko Samurai is NOT to be used for urgent needs. For medical emergencies, dial 911. Now available from your iPhone and Android! Please provide this summary of care documentation to your next provider. Your primary care clinician is listed as [address] [facility]. If you have any questions after today's visit, please call [phone].

## 2017-07-06 NOTE — PATIENT INSTRUCTIONS

## 2017-07-07 LAB
CREAT UR-MCNC: 135.28 MG/DL (ref 30–125)
MICROALBUMIN UR-MCNC: 9.1 MG/DL (ref 0–3)
MICROALBUMIN/CREAT UR-RTO: 67 MG/G (ref 0–30)

## 2017-10-20 ENCOUNTER — OFFICE VISIT (OUTPATIENT)
Dept: FAMILY MEDICINE CLINIC | Age: 62
End: 2017-10-20

## 2017-10-20 VITALS
TEMPERATURE: 99.1 F | RESPIRATION RATE: 18 BRPM | OXYGEN SATURATION: 97 % | DIASTOLIC BLOOD PRESSURE: 74 MMHG | BODY MASS INDEX: 29.85 KG/M2 | WEIGHT: 220.4 LBS | HEIGHT: 72 IN | HEART RATE: 79 BPM | SYSTOLIC BLOOD PRESSURE: 119 MMHG

## 2017-10-20 DIAGNOSIS — B02.9 HERPES ZOSTER WITHOUT COMPLICATION: Primary | ICD-10-CM

## 2017-10-20 RX ORDER — VALACYCLOVIR HYDROCHLORIDE 1 G/1
1000 TABLET, FILM COATED ORAL 3 TIMES DAILY
Qty: 21 TAB | Refills: 0 | Status: SHIPPED | OUTPATIENT
Start: 2017-10-20 | End: 2017-10-27

## 2017-10-20 NOTE — PROGRESS NOTES
Carmine Hart is a 63 y.o. male  Chief Complaint   Patient presents with    Rash     reddened raised area to LLQ for 48hrs after hunting in the woods and leaning on trees. Itches/applied calomine lotion     1. Have you been to the ER, urgent care clinic since your last visit? Hospitalized since your last visit? No    2. Have you seen or consulted any other health care providers outside of the [address] [place] [address] since your last visit? Include any pap smears or colon screening.  No   Patient declines flu vaccine at this time

## 2017-10-20 NOTE — PATIENT INSTRUCTIONS
Shingles: Care Instructions  Your Care Instructions    Shingles (herpes zoster) causes pain and a blistered rash. The rash can appear anywhere on the body but will be on only one side of the body, the left or right. It will be in a band, a strip, or a small area. The pain can be very severe. Shingles can also cause tingling or itching in the area of the rash. The blisters scab over after a few days and heal in 2 to 4 weeks. Medicines can help you feel better and may help prevent more serious problems caused by shingles. Shingles is caused by the same virus that causes chickenpox. When you have chickenpox, the virus gets into your nerve roots and stays there (becomes dormant) long after you get over the chickenpox. If the virus becomes active again, it can cause shingles. Follow-up care is a key part of your treatment and safety. Be sure to make and go to all appointments, and call your doctor if you are having problems. It's also a good idea to know your test results and keep a list of the medicines you take. How can you care for yourself at home? · Be safe with medicines. Take your medicines exactly as prescribed. Call your doctor if you think you are having a problem with your medicine. Antiviral medicine helps you get better faster. · Try not to scratch or pick at the blisters. They will crust over and fall off on their own if you leave them alone. · Put cool, wet cloths on the area to relieve pain and itching. You can also use calamine lotion. Try not to use so much lotion that it cakes and is hard to get off. · Put cornstarch or baking soda on the sores to help dry them out so they heal faster. · Do not use thick ointment, such as petroleum jelly, on the sores. This will keep them from drying and healing. · To help remove loose crusts, soak them in tap water. This can help decrease oozing, and dry and soothe the skin.   · Take an over-the-counter pain medicine, such as acetaminophen (Tylenol), ibuprofen (Advil, Motrin), or naproxen (Aleve). Read and follow all instructions on the label. · Avoid close contact with people until the blisters have healed. It is very important for you to avoid contact with anyone who has never had chickenpox or the chickenpox vaccine. Pregnant women, young babies, and anyone else who has a hard time fighting infection (such as someone with HIV, diabetes, or cancer) is especially at risk. When should you call for help? Call your doctor now or seek immediate medical care if:  · You have a new or higher fever. · You have a severe headache and a stiff neck. · You lose the ability to think clearly. · The rash spreads to your forehead, nose, eyes, or eyelids. · You have eye pain, or your vision gets worse. · You have new pain in your face, or you cannot move the muscles in your face. · Blisters spread to new parts of your body. Watch closely for changes in your health, and be sure to contact your doctor if:  · The rash has not healed after 2 to 4 weeks. · You still have pain after the rash has healed. Where can you learn more? Go to http://eddieiValidate.medion.info/. Yue Swift in the search box to learn more about \"Shingles: Care Instructions. \"  Current as of: March 3, 2017  Content Version: 11.3  © 8942-8052 [employer]. Care instructions adapted under license by "[employer] (which disclaims liability or warranty for this information). If you have questions about a medical condition or this instruction, always ask your healthcare professional. [place] [address] any warranty or liability for your use of this information. Valacyclovir (Valtrex) - (By mouth)   Why this medicine is used:   Treats herpes virus infections including chickenpox. This will not cure herpes, but may prevent a herpes breakout.   Contact a nurse or doctor right away if you have:  · Decrease in how much or how often you urinate  · Confusion, agitation, behavior changes  · Unusual bleeding or bruising  · Pinpoint red spots on your skin     Common side effects:  · Headache, tiredness  · Nausea, vomiting, stomach pain  © 2017 [facility] Information is for End User's use only and may not be sold, redistributed or otherwise used for commercial purposes.

## 2017-10-20 NOTE — PROGRESS NOTES
1. Have you been to the ER, urgent care clinic since your last visit? Hospitalized since your last visit? No    2. Have you seen or consulted any other health care providers outside of the [address] [place] [address] since your last visit? Include any pap smears or colon screening. No  Subjective:   Grady Zelaya is a 63 y.o. male presents to office for complaint of rash noticed less than two days ago on right lateral aspect of abdomen. Context: standing in woods near a tree on Wednesday and felt intense. itching and burning. When he went home his wife looked at the area and advised he make an appointment to be seen . ROS: +itching and burning today but denies pain to site, denies drainage from area, denies flu like symptoms. Current Outpatient Prescriptions   Medication Sig Dispense Refill    simvastatin (ZOCOR) 20 mg tablet TAKE 1 TABLET NIGHTLY 90 Tab 3    SITagliptin (JANUVIA) 100 mg tablet TAKE 1 TABLET DAILY 90 Tab 3    metFORMIN (GLUCOPHAGE) 500 mg tablet TAKE 2 TABLETS IN THE MORNING AND 2 TABLETS IN THE EVENING 360 Tab 2    diazePAM (VALIUM) 5 mg tablet TAKE ONE TABLET BY MOUTH EVERY 12 HOURS AS NEEDED 60 Tab 1    lisinopril (PRINIVIL, ZESTRIL) 5 mg tablet Take 1 Tab by mouth daily. 90 Tab 3    glucose blood VI test strips (ASCENSIA AUTODISC VI, ONE TOUCH ULTRA TEST VI) strip Blood sugar check daily 100 Strip 6    colchicine 0.6 mg tablet Take 2 tablets X 1 at onset of pain. May repeat 1 tab in one hour prn pain X1 30 Tab 0      Objective:   Awake and alert in no acute distress  Lungs clear throughout  S1 S2 RRR without ectopy or murmur auscultated.   Integumentary: left lateral aspect of abdomen with 1 cm cluster erythematous lesions no drainage approximately 1 cm in size    Visit Vitals    /74 (BP 1 Location: Left arm, BP Patient Position: Sitting)    Pulse 79    Temp 99.1 °F (37.3 °C) (Oral)    Resp 18    Ht 6' (1.829 m)    Wt 220 lb 6.4 oz (100 kg)    SpO2 97%    BMI 29.89 kg/m2      Diagnoses and all orders for this visit:    1. Herpes zoster without complication  -     valACYclovir (VALTREX) 1 gram tablet; Take 1 Tab by mouth three (3) times daily for 7 days. Reviewed risks and benefits and common side effects of new medication  General comfort measures  I have discussed the diagnosis with the patient and the intended plan as seen in the above orders. The patient has received an after-visit summary and questions were answered concerning future plans. I have discussed medication side effects and warnings with the patient as well. Follow-up Disposition:  Return if symptoms worsen or fail to improve.

## 2017-10-20 NOTE — MR AVS SNAPSHOT
Visit Information Date & Time Provider Department Dept. Phone Encounter #  
 10/20/2017 11:40 AM Tyrese Wilkinson NP [facility] [phone] [id number] Follow-up Instructions Return if symptoms worsen or fail to improve. Your Appointments 10/24/2017  3:00 PM  
Office Visit with Angelika Nuñez NP [facility] ([facility]) Appt Note: shingles [address] [address] [address]  
[phone]  
  
   
 [address] [facility]  
  
    
 11/6/2017  8:20 AM  
Office Visit with Marcio Burger MD  
[facility] [address] [facility]) Appt Note: fu on htn, dm, chol; fu on htn, dm, chol [id number] [address]  
[phone]  
  
   
 [address] [facility]  
  
    
 8/1/2018  8:30 AM  
ESTABLISHED PATIENT with Shoshana Dinh MD  
[facility] ([address] [facility]) Appt Note: 1yr F/U, Rev Imaging/Labs- to be done at [facility]  
 [address] 
[address] [facility] [address]  
[address] [facility] [address] [facility] [address] [facility] [address] Upcoming Health Maintenance Date Due ZOSTER VACCINE AGE 60> 12/3/2016 INFLUENZA AGE 9 TO ADULT 8/1/2017 EYE EXAM RETINAL OR DILATED Q1 11/1/2017 HEMOGLOBIN A1C Q6M 1/6/2018 FOOT EXAM Q1 7/6/2018 MICROALBUMIN Q1 7/6/2018 LIPID PANEL Q1 7/6/2018 DTaP/Tdap/Td series (2 - Td) 3/15/2021 Pneumococcal 19-64 Highest Risk (3 of 3 - PPSV23) 7/6/2022 COLONOSCOPY 4/8/2026 Allergies as of 10/20/2017  Review Complete On: 10/20/2017 By: Kailey Zuniga LPN No Known Allergies Current Immunizations  Reviewed on 3/6/2017 Name Date Pneumococcal Conjugate (PCV-13) 3/6/2017 TDAP Vaccine 3/15/2011 Not reviewed this visit You Were Diagnosed With   
  
 Codes Comments Herpes zoster without complication    -  Primary ICD-10-CM: B02.9 ICD-9-CM: 819. 9 Vitals BP Pulse Temp Resp Height(growth percentile) Weight(growth percentile) 119/74 (BP 1 Location: Left arm, BP Patient Position: Sitting) 79 99.1 °F (37.3 °C) (Oral) 18 6' (1.829 m) 220 lb 6.4 oz (100 kg) SpO2 BMI Smoking Status 97% 29.89 kg/m2 Former Smoker BMI and BSA Data Body Mass Index Body Surface Area  
 29.89 kg/m 2 2.25 m 2 Preferred Pharmacy Pharmacy Name Phone [facility], [address] [place] [address] Road [phone] Your Updated Medication List  
  
   
This list is accurate as of: 10/20/17 12:35 PM.  Always use your most recent med list.  
  
  
  
  
 colchicine 0.6 mg tablet Take 2 tablets X 1 at onset of pain. May repeat 1 tab in one hour prn pain X1  
  
 diazePAM 5 mg tablet Commonly known as:  VALIUM  
TAKE ONE TABLET BY MOUTH EVERY 12 HOURS AS NEEDED  
  
 glucose blood VI test strips strip Commonly known as:  ASCENSIA AUTODISC VI, ONE TOUCH ULTRA TEST VI Blood sugar check daily  
  
 lisinopril 5 mg tablet Commonly known as:  Sandra Needy Take 1 Tab by mouth daily. metFORMIN 500 mg tablet Commonly known as:  GLUCOPHAGE  
TAKE 2 TABLETS IN THE MORNING AND 2 TABLETS IN THE EVENING  
  
 simvastatin 20 mg tablet Commonly known as:  ZOCOR  
TAKE 1 TABLET NIGHTLY SITagliptin 100 mg tablet Commonly known as:  Ferol Chiquito TAKE 1 TABLET DAILY  
  
 valACYclovir 1 gram tablet Commonly known as:  VALTREX Take 1 Tab by mouth three (3) times daily for 7 days. Prescriptions Sent to Pharmacy Refills  
 valACYclovir (VALTREX) 1 gram tablet 0 Sig: Take 1 Tab by mouth three (3) times daily for 7 days. Class: Normal  
 Pharmacy: [facility], [address] [place] [address] Road Ph #: [phone] Route: Oral  
  
Follow-up Instructions Return if symptoms worsen or fail to improve. Patient Instructions Shingles: Care Instructions Your Care Instructions Shingles (herpes zoster) causes pain and a blistered rash. The rash can appear anywhere on the body but will be on only one side of the body, the left or right. It will be in a band, a strip, or a small area. The pain can be very severe. Shingles can also cause tingling or itching in the area of the rash. The blisters scab over after a few days and heal in 2 to 4 weeks. Medicines can help you feel better and may help prevent more serious problems caused by shingles. Shingles is caused by the same virus that causes chickenpox. When you have chickenpox, the virus gets into your nerve roots and stays there (becomes dormant) long after you get over the chickenpox. If the virus becomes active again, it can cause shingles. Follow-up care is a key part of your treatment and safety. Be sure to make and go to all appointments, and call your doctor if you are having problems. It's also a good idea to know your test results and keep a list of the medicines you take. How can you care for yourself at home? · Be safe with medicines. Take your medicines exactly as prescribed. Call your doctor if you think you are having a problem with your medicine. Antiviral medicine helps you get better faster. · Try not to scratch or pick at the blisters. They will crust over and fall off on their own if you leave them alone. · Put cool, wet cloths on the area to relieve pain and itching. You can also use calamine lotion. Try not to use so much lotion that it cakes and is hard to get off. · Put cornstarch or baking soda on the sores to help dry them out so they heal faster. · Do not use thick ointment, such as petroleum jelly, on the sores. This will keep them from drying and healing. · To help remove loose crusts, soak them in tap water. This can help decrease oozing, and dry and soothe the skin.  
· Take an over-the-counter pain medicine, such as acetaminophen (Tylenol), ibuprofen (Advil, Motrin), or naproxen (Aleve). Read and follow all instructions on the label. · Avoid close contact with people until the blisters have healed. It is very important for you to avoid contact with anyone who has never had chickenpox or the chickenpox vaccine. Pregnant women, young babies, and anyone else who has a hard time fighting infection (such as someone with HIV, diabetes, or cancer) is especially at risk. When should you call for help? Call your doctor now or seek immediate medical care if: 
· You have a new or higher fever. · You have a severe headache and a stiff neck. · You lose the ability to think clearly. · The rash spreads to your forehead, nose, eyes, or eyelids. · You have eye pain, or your vision gets worse. · You have new pain in your face, or you cannot move the muscles in your face. · Blisters spread to new parts of your body. Watch closely for changes in your health, and be sure to contact your doctor if: · The rash has not healed after 2 to 4 weeks. · You still have pain after the rash has healed. Where can you learn more? Go to http://eddieSimpler Networksdion.info/. Alvaro Drain in the search box to learn more about \"Shingles: Care Instructions. \" Current as of: March 3, 2017 Content Version: 11.3 © 4024-9884 [employer]. Care instructions adapted under license by [employer] (which disclaims liability or warranty for this information). If you have questions about a medical condition or this instruction, always ask your healthcare professional. [place] [address] any warranty or liability for your use of this information. Valacyclovir (Valtrex) - (By mouth) Why this medicine is used:  
Treats herpes virus infections including chickenpox. This will not cure herpes, but may prevent a herpes breakout. Contact a nurse or doctor right away if you have: · Decrease in how much or how often you urinate · Confusion, agitation, behavior changes · Unusual bleeding or bruising · Pinpoint red spots on your skin Common side effects: 
· Headache, tiredness · Nausea, vomiting, stomach pain © 2017 [facility] Information is for End User's use only and may not be sold, redistributed or otherwise used for commercial purposes. Introducing \[facility]\"" & [facility] SERVICES! Dear Kori Fails: Thank you for requesting a [employer] account. Our records indicate that you already have an active [employer] account. You can access your account anytime at [email or web address] Did you know that you can access your hospital and ER discharge instructions at any time in [employer]? You can also review all of your test results from your hospital stay or ER visit. Additional Information If you have questions, please visit the Frequently Asked Questions section of the [employer] website at [email or web address] Remember, [employer] is NOT to be used for urgent needs. For medical emergencies, dial 911. Now available from your iPhone and Android! Please provide this summary of care documentation to your next provider. Your primary care clinician is listed as [address]. If you have any questions after today's visit, please call [phone].

## 2017-11-09 ENCOUNTER — HOSPITAL ENCOUNTER (OUTPATIENT)
Dept: LAB | Age: 62
Discharge: HOME | End: 2017-11-09
Payer: OTHER GOVERNMENT

## 2017-11-09 DIAGNOSIS — E11.9 DIABETES MELLITUS WITHOUT COMPLICATION (HCC): ICD-10-CM

## 2017-11-09 DIAGNOSIS — I10 ESSENTIAL HYPERTENSION: ICD-10-CM

## 2017-11-09 DIAGNOSIS — E78.00 HYPERCHOLESTEREMIA: ICD-10-CM

## 2017-11-09 LAB
ALT SERPL-CCNC: 33 U/L (ref 16–61)
ANION GAP SERPL CALC-SCNC: 10 MMOL/L (ref 3–18)
AST SERPL-CCNC: 17 U/L (ref 15–37)
BUN SERPL-MCNC: 16 MG/DL (ref 7–18)
BUN/CREAT SERPL: 17 (ref 12–20)
CALCIUM SERPL-MCNC: 9.1 MG/DL (ref 8.5–10.1)
CHLORIDE SERPL-SCNC: 102 MMOL/L (ref 100–108)
CHOLEST SERPL-MCNC: 153 MG/DL
CO2 SERPL-SCNC: 25 MMOL/L (ref 21–32)
CREAT SERPL-MCNC: 0.92 MG/DL (ref 0.6–1.3)
EST. AVERAGE GLUCOSE BLD GHB EST-MCNC: 183 MG/DL
GLUCOSE SERPL-MCNC: 205 MG/DL (ref 74–99)
HBA1C MFR BLD: 8 % (ref 4.2–5.6)
HDLC SERPL-MCNC: 46 MG/DL (ref 40–60)
HDLC SERPL: 3.3 {RATIO} (ref 0–5)
LDLC SERPL CALC-MCNC: 76.2 MG/DL (ref 0–100)
LIPID PROFILE,FLP: ABNORMAL
POTASSIUM SERPL-SCNC: 4.4 MMOL/L (ref 3.5–5.5)
SODIUM SERPL-SCNC: 137 MMOL/L (ref 136–145)
TRIGL SERPL-MCNC: 154 MG/DL (ref ?–150)
VLDLC SERPL CALC-MCNC: 30.8 MG/DL

## 2017-11-09 PROCEDURE — 80048 BASIC METABOLIC PNL TOTAL CA: CPT | Performed by: FAMILY MEDICINE

## 2017-11-09 PROCEDURE — 84460 ALANINE AMINO (ALT) (SGPT): CPT | Performed by: FAMILY MEDICINE

## 2017-11-09 PROCEDURE — 84450 TRANSFERASE (AST) (SGOT): CPT | Performed by: FAMILY MEDICINE

## 2017-11-09 PROCEDURE — 83036 HEMOGLOBIN GLYCOSYLATED A1C: CPT | Performed by: FAMILY MEDICINE

## 2017-11-09 PROCEDURE — 36415 COLL VENOUS BLD VENIPUNCTURE: CPT | Performed by: FAMILY MEDICINE

## 2017-11-09 PROCEDURE — 80061 LIPID PANEL: CPT | Performed by: FAMILY MEDICINE

## 2017-11-14 ENCOUNTER — OFFICE VISIT (OUTPATIENT)
Dept: FAMILY MEDICINE CLINIC | Age: 62
End: 2017-11-14

## 2017-11-14 VITALS
WEIGHT: 223 LBS | DIASTOLIC BLOOD PRESSURE: 80 MMHG | HEIGHT: 72 IN | BODY MASS INDEX: 30.2 KG/M2 | OXYGEN SATURATION: 97 % | HEART RATE: 73 BPM | TEMPERATURE: 98.3 F | SYSTOLIC BLOOD PRESSURE: 136 MMHG | RESPIRATION RATE: 18 BRPM

## 2017-11-14 DIAGNOSIS — Z23 ENCOUNTER FOR IMMUNIZATION: ICD-10-CM

## 2017-11-14 DIAGNOSIS — E11.9 DIABETES MELLITUS WITHOUT COMPLICATION (HCC): Primary | ICD-10-CM

## 2017-11-14 DIAGNOSIS — E78.00 HYPERCHOLESTEROLEMIA: ICD-10-CM

## 2017-11-14 NOTE — MR AVS SNAPSHOT
Visit Information Date & Time Provider Department Dept. Phone Encounter #  
 11/14/2017  8:20 AM Juana Rascon, [address] [place] [address] [phone] [id number] Follow-up Instructions Return in about 4 months (around 3/14/2018) for dm/chol. Your Appointments 8/1/2018  8:30 AM  
ESTABLISHED PATIENT with Vipin Burnham MD  
[facility] Appt Note: 1yr F/U, Rev Imaging/Labs- to be done at [facility]  
 [address] 
[address] [facility] [address]  
[address] [facility] [address] Upcoming Health Maintenance Date Due  
 EYE EXAM RETINAL OR DILATED Q1 11/16/2017* ZOSTER VACCINE AGE 60> 2/15/2018* HEMOGLOBIN A1C Q6M 5/9/2018 FOOT EXAM Q1 7/6/2018 MICROALBUMIN Q1 7/6/2018 LIPID PANEL Q1 11/9/2018 DTaP/Tdap/Td series (2 - Td) 3/15/2021 Pneumococcal 19-64 Highest Risk (3 of 3 - PPSV23) 7/6/2022 COLONOSCOPY 4/8/2026 *Topic was postponed. The date shown is not the original due date. Allergies as of 11/14/2017  Review Complete On: 11/14/2017 By: Aileen Goldman LPN No Known Allergies Current Immunizations  Reviewed on 3/6/2017 Name Date Influenza Vaccine (Quad) PF  Incomplete Pneumococcal Conjugate (PCV-13) 3/6/2017 TDAP Vaccine 3/15/2011 Not reviewed this visit You Were Diagnosed With   
  
 Codes Comments Diabetes mellitus without complication ([facility]    -  Primary ICD-10-CM: E11.9 ICD-9-CM: 250.00 Hypercholesterolemia     ICD-10-CM: E78.00 ICD-9-CM: 272.0 Encounter for immunization     ICD-10-CM: P83 ICD-9-CM: V03.89 Vitals BP Pulse Temp Resp Height(growth percentile) Weight(growth percentile) 136/80 (BP 1 Location: Right arm, BP Patient Position: Sitting) 73 98.3 °F (36.8 °C) (Oral) 18 6' (1.829 m) 223 lb (101.2 kg) SpO2 BMI Smoking Status 97% 30.24 kg/m2 Former Smoker BMI and BSA Data Body Mass Index Body Surface Area  
 30.24 kg/m 2 2.27 m 2 Preferred Pharmacy Pharmacy Name Phone [address] [facility] [phone] Your Updated Medication List  
  
   
This list is accurate as of: 11/14/17  9:07 AM.  Always use your most recent med list.  
  
  
  
  
 colchicine 0.6 mg tablet Take 2 tablets X 1 at onset of pain. May repeat 1 tab in one hour prn pain X1  
  
 diazePAM 5 mg tablet Commonly known as:  VALIUM  
TAKE ONE TABLET BY MOUTH EVERY 12 HOURS AS NEEDED  
  
 glucose blood VI test strips strip Commonly known as:  ASCENSIA AUTODISC VI, ONE TOUCH ULTRA TEST VI Blood sugar check daily  
  
 lisinopril 5 mg tablet Commonly known as:  Adilene Ian Take 1 Tab by mouth daily. metFORMIN 500 mg tablet Commonly known as:  GLUCOPHAGE  
TAKE 2 TABLETS IN THE MORNING AND 2 TABLETS IN THE EVENING  
  
 simvastatin 20 mg tablet Commonly known as:  ZOCOR  
TAKE 1 TABLET NIGHTLY SITagliptin 100 mg tablet Commonly known as:  Rosemarie Karie TAKE 1 TABLET DAILY We Performed the Following INFLUENZA VIRUS VAC QUAD,SPLIT,PRESV FREE SYRINGE IM [id number] CPT(R)] Follow-up Instructions Return in about 4 months (around 3/14/2018) for dm/chol. To-Do List   
 03/01/2018 Lab:  ALT   
  
 03/01/2018 Lab:  AST   
  
 03/01/2018 Lab:  HEMOGLOBIN A1C WITH EAG   
  
 03/01/2018 Lab:  LIPID PANEL   
  
 03/01/2018 Lab:  METABOLIC PANEL, BASIC Patient Instructions Influenza (Flu) Vaccine: Care Instructions Your Care Instructions Influenza (flu) is an infection in the lungs and breathing passages. It is caused by the influenza virus. There are different strains, or types, of the flu virus every year. The flu comes on quickly. It can cause a cough, stuffy nose, fever, chills, tiredness, and aches and pains.  These symptoms may last up to 10 days. The flu can make you feel very sick, but most of the time it doesn't lead to other problems. But it can cause serious problems in people who are older or who have a long-term illness, such as heart disease or diabetes. You can help prevent the flu by getting a flu vaccine every year, as soon as it is available. You cannot get the flu from the vaccine. The vaccine prevents most cases of the flu. But even when the vaccine doesn't prevent the flu, it can make symptoms less severe and reduce the chance of problems from the flu. Sometimes, young children and people who have an immune system problem may have a slight fever or muscle aches or pains 6 to 12 hours after getting the shot. These symptoms usually last 1 or 2 days. Follow-up care is a key part of your treatment and safety. Be sure to make and go to all appointments, and call your doctor if you are having problems. It's also a good idea to know your test results and keep a list of the medicines you take. Who should get the flu vaccine? Everyone age 7 months or older should get a flu vaccine each year. It lowers the chance of getting and spreading the flu. The vaccine is very important for people who are at high risk for getting other health problems from the flu. This includes: · Anyone 48years of age or older. · People who live in a long-term care center, such as a nursing home. · All children 6 months through 25years of age. · Adults and children 6 months and older who have long-term heart or lung problems, such as asthma. · Adults and children 6 months and older who needed medical care or were in a hospital during the past year because of diabetes, chronic kidney disease, or a weak immune system (including HIV or AIDS). · Women who will be pregnant during the flu season. · People who have any condition that can make it hard to breathe or swallow (such as a brain injury or muscle disorders). · People who can give the flu to others who are at high risk for problems from the flu. This includes all health care workers and close contacts of people age 72 or older. Who should not get the flu vaccine? The person who gives the vaccine may tell you not to get it if you: 
· Have a severe allergy to eggs or any part of the vaccine. · Have had a severe reaction to a flu vaccine in the past. 
· Have had Guillain-Barré syndrome (GBS). · Are sick with a fever. (Get the vaccine when symptoms are gone.) How can you care for yourself at home? · If you or your child has a sore arm or a slight fever after the shot, take an over-the-counter pain medicine, such as acetaminophen (Tylenol) or ibuprofen (Advil, Motrin). Read and follow all instructions on the label. Do not give aspirin to anyone younger than 20. It has been linked to Reye syndrome, a serious illness. · Do not take two or more pain medicines at the same time unless the doctor told you to. Many pain medicines have acetaminophen, which is Tylenol. Too much acetaminophen (Tylenol) can be harmful. When should you call for help? Call 911 anytime you think you may need emergency care. For example, call if after getting the flu vaccine: 
? · You have symptoms of a severe reaction to the flu vaccine. Symptoms of a severe reaction may include: ¨ Severe difficulty breathing. ¨ Sudden raised, red areas (hives) all over your body. ¨ Severe lightheadedness. ?Call your doctor now or seek immediate medical care if after getting the flu vaccine: 
? · You think you are having a reaction to the flu vaccine, such as a new fever. ? Watch closely for changes in your health, and be sure to contact your doctor if you have any problems. Where can you learn more? Go to http://eddie-dion.info/. Enter E722 in the search box to learn more about \"Influenza (Flu) Vaccine: Care Instructions. \" Current as of: September 24, 2016 Content Version: 11.4 © 3581-8125 Healthwise, Incorporated. Care instructions adapted under license by [employer] (which disclaims liability or warranty for this information). If you have questions about a medical condition or this instruction, always ask your healthcare professional. [address] any warranty or liability for your use of this information. Introducing hospitals & HEALTH SERVICES! Dear Kori Fails: Thank you for requesting a [employer] account. Our records indicate that you already have an active [employer] Gl[employer] account. You can access your account anytime at [email or web address] Did you know that you can access your hospital and ER discharge instructions at any time in [employer] Glint? You can also review all of your test results from your hospital stay or ER visit. Additional Information If you have questions, please visit the Frequently Asked Questions section of the [employer]int website at [email or web address] Remember, Deep Glint is NOT to be used for urgent needs. For medical emergencies, dial 911. Now available from your iPhone and Android! Please provide this summary of care documentation to your next provider. Your primary care clinician is listed as [address]. If you have any questions after today's visit, please call [phone].

## 2017-11-14 NOTE — PROGRESS NOTES
1. Have you been to the ER, urgent care clinic since your last visit? Hospitalized since your last visit? No    2. Have you seen or consulted any other health care providers outside of the [address] [place] [address] since your last visit? Include any pap smears or colon screening.  No

## 2017-11-14 NOTE — PATIENT INSTRUCTIONS
Influenza (Flu) Vaccine: Care Instructions  Your Care Instructions    Influenza (flu) is an infection in the lungs and breathing passages. It is caused by the influenza virus. There are different strains, or types, of the flu virus every year. The flu comes on quickly. It can cause a cough, stuffy nose, fever, chills, tiredness, and aches and pains. These symptoms may last up to 10 days. The flu can make you feel very sick, but most of the time it doesn't lead to other problems. But it can cause serious problems in people who are older or who have a long-term illness, such as heart disease or diabetes. You can help prevent the flu by getting a flu vaccine every year, as soon as it is available. You cannot get the flu from the vaccine. The vaccine prevents most cases of the flu. But even when the vaccine doesn't prevent the flu, it can make symptoms less severe and reduce the chance of problems from the flu. Sometimes, young children and people who have an immune system problem may have a slight fever or muscle aches or pains 6 to 12 hours after getting the shot. These symptoms usually last 1 or 2 days. Follow-up care is a key part of your treatment and safety. Be sure to make and go to all appointments, and call your doctor if you are having problems. It's also a good idea to know your test results and keep a list of the medicines you take. Who should get the flu vaccine? Everyone age 7 months or older should get a flu vaccine each year. It lowers the chance of getting and spreading the flu. The vaccine is very important for people who are at high risk for getting other health problems from the flu. This includes:  · Anyone 48years of age or older. · People who live in a long-term care center, such as a nursing home. · All children 6 months through 25years of age. · Adults and children 6 months and older who have long-term heart or lung problems, such as asthma.   · Adults and children 6 months and older who needed medical care or were in a hospital during the past year because of diabetes, chronic kidney disease, or a weak immune system (including HIV or AIDS). · Women who will be pregnant during the flu season. · People who have any condition that can make it hard to breathe or swallow (such as a brain injury or muscle disorders). · People who can give the flu to others who are at high risk for problems from the flu. This includes all health care workers and close contacts of people age 72 or older. Who should not get the flu vaccine? The person who gives the vaccine may tell you not to get it if you:  · Have a severe allergy to eggs or any part of the vaccine. · Have had a severe reaction to a flu vaccine in the past.  · Have had Guillain-Barré syndrome (GBS). · Are sick with a fever. (Get the vaccine when symptoms are gone.)  How can you care for yourself at home? · If you or your child has a sore arm or a slight fever after the shot, take an over-the-counter pain medicine, such as acetaminophen (Tylenol) or ibuprofen (Advil, Motrin). Read and follow all instructions on the label. Do not give aspirin to anyone younger than 20. It has been linked to Reye syndrome, a serious illness. · Do not take two or more pain medicines at the same time unless the doctor told you to. Many pain medicines have acetaminophen, which is Tylenol. Too much acetaminophen (Tylenol) can be harmful. When should you call for help? Call 911 anytime you think you may need emergency care. For example, call if after getting the flu vaccine:  ? · You have symptoms of a severe reaction to the flu vaccine. Symptoms of a severe reaction may include:  ¨ Severe difficulty breathing. ¨ Sudden raised, red areas (hives) all over your body. ¨ Severe lightheadedness. ?Call your doctor now or seek immediate medical care if after getting the flu vaccine:  ? · You think you are having a reaction to the flu vaccine, such as a new fever. ?Watch closely for changes in your health, and be sure to contact your doctor if you have any problems. Where can you learn more? Go to http://eddie-dion.info/. Enter I022 in the search box to learn more about \"Influenza (Flu) Vaccine: Care Instructions. \"  Current as of: September 24, 2016  Content Version: 11.4  © 9901-6555 [employer]. Care instructions adapted under license by [employer] (which disclaims liability or warranty for this information). If you have questions about a medical condition or this instruction, always ask your healthcare professional. [address] any warranty or liability for your use of this information.

## 2017-11-14 NOTE — PROGRESS NOTES
HISTORY OF PRESENT ILLNESS  Grady Zelaya is a 63 y.o. male. HPI   Patient is here today for evaluation and treatment of; Diabetes, Hypertension, Cholesterol. Diabetes:  Has had blood work; reports some dietary indiscretions; states he plans on changing his diet to improve his blood sugar and A1c. He is compliant with his meds as listed below. Hypertension: BP is stable; he is on lisinopril; Cholesterol: on zocor; tolerates med well;   Agrees to flu shot. Has no new significant complaints. Current Outpatient Prescriptions:     simvastatin (ZOCOR) 20 mg tablet, TAKE 1 TABLET NIGHTLY, Disp: 90 Tab, Rfl: 3    SITagliptin (JANUVIA) 100 mg tablet, TAKE 1 TABLET DAILY, Disp: 90 Tab, Rfl: 3    metFORMIN (GLUCOPHAGE) 500 mg tablet, TAKE 2 TABLETS IN THE MORNING AND 2 TABLETS IN THE EVENING, Disp: 360 Tab, Rfl: 2    diazePAM (VALIUM) 5 mg tablet, TAKE ONE TABLET BY MOUTH EVERY 12 HOURS AS NEEDED, Disp: 60 Tab, Rfl: 1    lisinopril (PRINIVIL, ZESTRIL) 5 mg tablet, Take 1 Tab by mouth daily. , Disp: 90 Tab, Rfl: 3    glucose blood VI test strips (ASCENSIA AUTODISC VI, ONE TOUCH ULTRA TEST VI) strip, Blood sugar check daily, Disp: 100 Strip, Rfl: 6    colchicine 0.6 mg tablet, Take 2 tablets X 1 at onset of pain. May repeat 1 tab in one hour prn pain X1, Disp: 30 Tab, Rfl: 0    PMH,  Meds, Allergies, Family History, Social history reviewed      Review of Systems   Constitutional: Negative for chills and fever. HENT:        Scratchy throat   Cardiovascular: Negative for chest pain and leg swelling. Physical Exam   Constitutional: He appears well-developed and well-nourished. No distress. Cardiovascular: Normal rate and regular rhythm. Exam reveals no gallop and no friction rub. No murmur heard. Pulmonary/Chest: Breath sounds normal. No respiratory distress. He has no wheezes. Nursing note and vitals reviewed.      Visit Vitals    /80 (BP 1 Location: Right arm, BP Patient Position: Sitting)    Pulse 73    Temp 98.3 °F (36.8 °C) (Oral)    Resp 18    Ht 6' (1.829 m)    Wt 223 lb (101.2 kg)    SpO2 97%    BMI 30.24 kg/m2       Lab Results   Component Value Date/Time    Cholesterol, total 153 11/09/2017 08:52 AM    Cholesterol (POC) 136 06/18/2013 08:30 AM    HDL Cholesterol 46 11/09/2017 08:52 AM    HDL Cholesterol (POC) 35 06/18/2013 08:30 AM    LDL Cholesterol (POC) 58 06/18/2013 08:30 AM    LDL, calculated 76.2 11/09/2017 08:52 AM    VLDL, calculated 30.8 11/09/2017 08:52 AM    Triglyceride 154 11/09/2017 08:52 AM    Triglycerides (POC) 219 06/18/2013 08:30 AM    CHOL/HDL Ratio 3.3 11/09/2017 08:52 AM     Lab Results   Component Value Date/Time    Hemoglobin A1c 8.0 11/09/2017 08:52 AM    Hemoglobin A1c (POC) 6.4 04/21/2015 08:53 AM         ASSESSMENT and PLAN    ICD-10-CM ICD-9-CM    1. Diabetes mellitus without complication (HCC) L99.6 785.69 METABOLIC PANEL, BASIC      HEMOGLOBIN A1C WITH EAG   2. Hypercholesterolemia E78.00 272.0 LIPID PANEL      AST      ALT   3. Encounter for immunization Z23 V03.89 INFLUENZA VIRUS VAC QUAD,SPLIT,PRESV FREE SYRINGE IM       As above, #1 not fully controlled  Strict diabetic diet advised  Flu shot today  Labs for next visit ordered per pt request  Follow-up Disposition:  Return in about 4 months (around 3/14/2018) for dm/chol. An After Visit Summary was printed and given to the patient. This has been fully explained to the patient, who indicates understanding.

## 2018-02-26 RX ORDER — LISINOPRIL 5 MG/1
TABLET ORAL
Qty: 90 TAB | Refills: 3 | Status: SHIPPED | OUTPATIENT
Start: 2018-02-26 | End: 2019-03-30 | Stop reason: SDUPTHER

## 2018-03-12 ENCOUNTER — HOSPITAL ENCOUNTER (OUTPATIENT)
Dept: LAB | Age: 63
Discharge: HOME | End: 2018-03-12
Payer: OTHER GOVERNMENT

## 2018-03-12 DIAGNOSIS — E11.9 DIABETES MELLITUS WITHOUT COMPLICATION (HCC): ICD-10-CM

## 2018-03-12 DIAGNOSIS — E78.00 HYPERCHOLESTEROLEMIA: ICD-10-CM

## 2018-03-12 LAB
ALT SERPL-CCNC: 30 U/L (ref 16–61)
ANION GAP SERPL CALC-SCNC: 8 MMOL/L (ref 3–18)
AST SERPL-CCNC: 15 U/L (ref 15–37)
BUN SERPL-MCNC: 13 MG/DL (ref 7–18)
BUN/CREAT SERPL: 14 (ref 12–20)
CALCIUM SERPL-MCNC: 9.4 MG/DL (ref 8.5–10.1)
CHLORIDE SERPL-SCNC: 107 MMOL/L (ref 100–108)
CHOLEST SERPL-MCNC: 134 MG/DL
CO2 SERPL-SCNC: 26 MMOL/L (ref 21–32)
CREAT SERPL-MCNC: 0.91 MG/DL (ref 0.6–1.3)
EST. AVERAGE GLUCOSE BLD GHB EST-MCNC: 180 MG/DL
GLUCOSE SERPL-MCNC: 179 MG/DL (ref 74–99)
HBA1C MFR BLD: 7.9 % (ref 4.2–5.6)
HDLC SERPL-MCNC: 44 MG/DL (ref 40–60)
HDLC SERPL: 3 {RATIO} (ref 0–5)
LDLC SERPL CALC-MCNC: 52.8 MG/DL (ref 0–100)
LIPID PROFILE,FLP: ABNORMAL
POTASSIUM SERPL-SCNC: 4.2 MMOL/L (ref 3.5–5.5)
SODIUM SERPL-SCNC: 141 MMOL/L (ref 136–145)
TRIGL SERPL-MCNC: 186 MG/DL (ref ?–150)
VLDLC SERPL CALC-MCNC: 37.2 MG/DL

## 2018-03-12 PROCEDURE — 84460 ALANINE AMINO (ALT) (SGPT): CPT | Performed by: FAMILY MEDICINE

## 2018-03-12 PROCEDURE — 36415 COLL VENOUS BLD VENIPUNCTURE: CPT | Performed by: FAMILY MEDICINE

## 2018-03-12 PROCEDURE — 80048 BASIC METABOLIC PNL TOTAL CA: CPT | Performed by: FAMILY MEDICINE

## 2018-03-12 PROCEDURE — 83036 HEMOGLOBIN GLYCOSYLATED A1C: CPT | Performed by: FAMILY MEDICINE

## 2018-03-12 PROCEDURE — 80061 LIPID PANEL: CPT | Performed by: FAMILY MEDICINE

## 2018-03-12 PROCEDURE — 84450 TRANSFERASE (AST) (SGOT): CPT | Performed by: FAMILY MEDICINE

## 2018-03-13 ENCOUNTER — OFFICE VISIT (OUTPATIENT)
Dept: FAMILY MEDICINE CLINIC | Age: 63
End: 2018-03-13

## 2018-03-13 VITALS
SYSTOLIC BLOOD PRESSURE: 137 MMHG | OXYGEN SATURATION: 96 % | HEIGHT: 72 IN | HEART RATE: 75 BPM | DIASTOLIC BLOOD PRESSURE: 72 MMHG | RESPIRATION RATE: 16 BRPM | TEMPERATURE: 98.5 F | BODY MASS INDEX: 29.66 KG/M2 | WEIGHT: 219 LBS

## 2018-03-13 DIAGNOSIS — E11.9 DIABETES MELLITUS WITHOUT COMPLICATION (HCC): Primary | ICD-10-CM

## 2018-03-13 DIAGNOSIS — L98.9 SKIN LESION OF BACK: ICD-10-CM

## 2018-03-13 DIAGNOSIS — E78.00 HYPERCHOLESTEREMIA: ICD-10-CM

## 2018-03-13 PROBLEM — E11.21 TYPE 2 DIABETES WITH NEPHROPATHY (HCC): Status: ACTIVE | Noted: 2018-03-13

## 2018-03-13 RX ORDER — ASPIRIN 81 MG/1
81 TABLET ORAL DAILY
COMMUNITY
End: 2021-08-13 | Stop reason: ALTCHOICE

## 2018-03-13 NOTE — PROGRESS NOTES
1. Have you been to the ER, urgent care clinic since your last visit? Hospitalized since your last visit? No    2. Have you seen or consulted any other health care providers outside of the [address] [place] [address] since your last visit? Include any pap smears or colon screening.  No

## 2018-03-13 NOTE — MR AVS SNAPSHOT
[address] [place] [address] 
 
 
 [address] [address] [address] [address] 
[phone] Patient: Marybel Aggarwal MRN: [id number] QFE:3[birth year]1030 Visit Information Date & Time Provider Department Dept. Phone Encounter #  
 3/13/2018  8:20 AM Elviradarien Quijano [address] [place] [address] [phone] [id number] Follow-up Instructions Return in about 4 months (around 7/13/2018) for DM/chol. Your Appointments 8/8/2018  8:30 AM  
ESTABLISHED PATIENT with Sandro Gordillo MD  
[facility]: 1yr F/U, Rev Imaging/Labs- to be done at [facility]; Appt R/S due to [facility] med mtg. Letter mailed to [address]  
  
   
 [address] [place] [address] [id number] [place], [place] [id number] Upcoming Health Maintenance Date Due ZOSTER VACCINE AGE 60> 7/26/2018* FOOT EXAM Q1 7/6/2018 MICROALBUMIN Q1 7/6/2018 HEMOGLOBIN A1C Q6M 9/12/2018 EYE EXAM RETINAL OR DILATED Q1 11/16/2018 LIPID PANEL Q1 3/12/2019 DTaP/Tdap/Td series (2 - Td) 3/15/2021 Pneumococcal 19-64 Highest Risk (3 of 3 - PPSV23) 7/6/2022 COLONOSCOPY 4/8/2026 *Topic was postponed. The date shown is not the original due date. Allergies as of 3/13/2018  Review Complete On: 3/13/2018 By: Staci Grant LPN No Known Allergies Current Immunizations  Reviewed on 3/6/2017 Name Date Influenza Vaccine (Quad) PF 11/14/2017 Pneumococcal Conjugate (PCV-13) 3/6/2017 TDAP Vaccine 3/15/2011 Not reviewed this visit You Were Diagnosed With   
  
 Codes Comments Diabetes [facility]    -  Primary ICD-10-CM: E11.9 ICD-9-CM: 250.00 Hypercholesteremia     ICD-10-CM: E78.00 ICD-9-CM: 272.0 Skin lesion of back     ICD-10-CM: L98.9 ICD-9-CM: 709.9 Vitals BP Pulse Temp Resp Height(growth percentile) Weight(growth percentile) 137/72 (BP 1 Location: Right arm, BP Patient Position: Sitting) 75 98.5 °F (36.9 °C) (Oral) 16 6' (1.829 m) 219 lb (99.3 kg) SpO2 BMI Smoking Status 96% 29.7 kg/m2 Former Smoker BMI and BSA Data Body Mass Index Body Surface Area  
 29.7 kg/m 2 2.25 m 2 Preferred Pharmacy Pharmacy Name Phone [address] [facility] [phone] Your Updated Medication List  
  
   
This list is accurate as of 3/13/18  9:20 AM.  Always use your most recent med list.  
  
  
  
  
 aspirin delayed-release 81 mg tablet Take  by mouth daily. colchicine 0.6 mg tablet Take 2 tablets X 1 at onset of pain. May repeat 1 tab in one hour prn pain X1  
  
 diazePAM 5 mg tablet Commonly known as:  VALIUM  
TAKE ONE TABLET BY MOUTH EVERY 12 HOURS AS NEEDED  
  
 glucose blood VI test strips strip Commonly known as:  ASCENSIA AUTODISC VI, ONE TOUCH ULTRA TEST VI Blood sugar check daily  
  
 lisinopril 5 mg tablet Commonly known as:  PRINIVIL ZESTRIL  
TAKE 1 TABLET DAILY  
  
 metFORMIN 500 mg tablet Commonly known as:  GLUCOPHAGE  
TAKE 2 TABLETS IN THE MORNING AND 2 TABLETS IN THE EVENING  
  
 simvastatin 20 mg tablet Commonly known as:  ZOCOR  
TAKE 1 TABLET NIGHTLY SITagliptin 100 mg tablet Commonly known as:  Alexi Halon TAKE 1 TABLET DAILY Prescriptions Sent to Pharmacy Refills  
 glucose blood VI test strips (ASCENSIA AUTODISC VI, ONE TOUCH ULTRA TEST VI) strip 6 Sig: Blood sugar check daily Class: Normal  
 Pharmacy: [address], [address] Ph #: [phone] We Performed the Following REFERRAL TO DERMATOLOGY [REF19 Custom] Follow-up Instructions Return in about 4 months (around 7/13/2018) for DM/chol. Referral Information Referral ID Referred By Referred To [id number] Adan Jimenes MD   
   [address] [place] [address] E Samantha garcia, [address] Phone: [phone] Fax: [phone] Visits Status Start Date End Date 1 New Request 3/13/18 3/13/19 If your referral has a status of pending review or denied, additional information will be sent to support the outcome of this decision. Patient Instructions Dulaglutide (By injection) Dulaglutide (doo-la-GLOO-tide) Treats type 2 diabetes. Brand Name(s): Trulicity There may be other brand names for this medicine. When This Medicine Should Not Be Used: This medicine is not right for everyone. Do not use it if you had an allergic reaction to dulaglutide, you have multiple endocrine neoplasia syndrome type 2 (MEN 2), or you or anyone in your family has had medullary thyroid cancer. How to Use This Medicine:  
Injectable · Your doctor will prescribe your exact dose. This medicine is usually given once a week, on the same day of the week. It is given as a shot under the skin of your stomach, thighs, or upper arms. · You may be taught how to give your medicine at home. Make sure you understand all instructions before giving yourself an injection. Do not use more medicine or use it more often than your doctor tells you to. · If you use insulin in addition to this medicine, do not mix them in the same syringe. You may give the shots in the same area (such as your stomach), but do not give the shots right next to each other. · If the medicine in the pen or prefilled syringe has changed color, looks cloudy, or has particles in it, do not use it. · You will be shown the body areas where this shot can be given. Use a different body area each time you give yourself a shot. Keep track of where you give each shot to make sure you rotate body areas. · Use a new needle and syringe each time you inject your medicine. · This medicine should come with a Medication Guide. Ask your pharmacist for a copy if you do not have one. · Missed dose: Use a missed dose as soon as possible if there are at least 3 days before your next dose is due. If your next dose is due in less than 3 days, then skip the missed dose. · Store your medicine pens or prefilled syringes in the refrigerator and keep them in the original carton. Protect the pens from light. You may also store the pens at room temperature for up to 14 days. Do not freeze the medicine, and do not use the medicine if it has been frozen. Drugs and Foods to Avoid: Ask your doctor or pharmacist before using any other medicine, including over-the-counter medicines, vitamins, and herbal products. Warnings While Using This Medicine: · Tell your doctor if you are pregnant or breastfeeding, or if you have kidney disease or a history of pancreas problems. Tell your doctor if you have severe digestion problems (such as gastroparesis) or stomach or bowel problems. · This medicine may cause the following problems: 
¨ Increased risk of thyroid tumor ¨ Pancreatitis ¨ Low blood sugar (more likely if you also take insulin or other diabetes medicine) · Your doctor will do lab tests at regular visits to check on the effects of this medicine. Keep all appointments. · Keep all medicine out of the reach of children. Never share your medicine with anyone. Do not share needles or pens because you can spread an infection. Possible Side Effects While Using This Medicine:  
Call your doctor right away if you notice any of these side effects: · Allergic reaction: Itching or hives, swelling in your face or hands, swelling or tingling in your mouth or throat, chest tightness, trouble breathing · A lump or swelling in your neck, trouble breathing or swallowing · Change in how much or how often you urinate · Shaking, trembling, sweating, fast or pounding heartbeat, lightheadedness, hunger, confusion · Sudden and severe stomach pain, nausea, vomiting, fever, and lightheadedness If you notice these less serious side effects, talk with your doctor: · Diarrhea · Redness, itching, swelling, or any changes in your skin where the shot was given If you notice other side effects that you think are caused by this medicine, tell your doctor. Call your doctor for medical advice about side effects. You may report side effects to FDA at 7-215-FTP-6332 © 2017 [address] Information is for End User's use only and may not be sold, redistributed or otherwise used for commercial purposes. The above information is an  only. It is not intended as medical advice for individual conditions or treatments. Talk to your doctor, nurse or pharmacist before following any medical regimen to see if it is safe and effective for you. Introducing [facility]! Dear Diana Castillo: Thank you for requesting a [employer] account. Our records indicate that you already have an active [employer] account. You can access your account anytime at [email or web address] Did you know that you can access your hospital and ER discharge instructions at any time in [employer]? You can also review all of your test results from your hospital stay or ER visit. Additional Information If you have questions, please visit the Frequently Asked Questions section of the [employer] website at [email or web address] Remember, [employer] is NOT to be used for urgent needs. For medical emergencies, dial 911. Now available from your iPhone and Android! Please provide this summary of care documentation to your next provider. Your primary care clinician is listed as [address]. If you have any questions after today's visit, please call [phone].

## 2018-03-13 NOTE — PROGRESS NOTES
HISTORY OF PRESENT ILLNESS  Keiry Luna is a 66 y.o. male. HPI  Patient is here today for evaluation and treatment of: Diabetes/Cholesterol problem    Diabetes:  Home blood sugars have been in the low 100s-160s. Last A1c was 7.9%; he has not been exercising as much;  Diet can still be improved. Cholesterol:  Pt is on zocor; ; takes med daily; tolerates med well. Has a skin lesion on the upper back in the mid back; would like a referral to derm; has seen Dr. Yessica Reyna before; states he also has skin tags in the groin he would like removed. Current Outpatient Prescriptions:     glucose blood VI test strips (ASCENSIA AUTODISC VI, ONE TOUCH ULTRA TEST VI) strip, Blood sugar check daily, Disp: 100 Strip, Rfl: 6    aspirin delayed-release 81 mg tablet, Take  by mouth daily. , Disp: , Rfl:     lisinopril (PRINIVIL, ZESTRIL) 5 mg tablet, TAKE 1 TABLET DAILY, Disp: 90 Tab, Rfl: 3    simvastatin (ZOCOR) 20 mg tablet, TAKE 1 TABLET NIGHTLY, Disp: 90 Tab, Rfl: 3    SITagliptin (JANUVIA) 100 mg tablet, TAKE 1 TABLET DAILY, Disp: 90 Tab, Rfl: 3    metFORMIN (GLUCOPHAGE) 500 mg tablet, TAKE 2 TABLETS IN THE MORNING AND 2 TABLETS IN THE EVENING, Disp: 360 Tab, Rfl: 2    diazePAM (VALIUM) 5 mg tablet, TAKE ONE TABLET BY MOUTH EVERY 12 HOURS AS NEEDED, Disp: 60 Tab, Rfl: 1    colchicine 0.6 mg tablet, Take 2 tablets X 1 at onset of pain. May repeat 1 tab in one hour prn pain X1, Disp: 30 Tab, Rfl: 0    PMH,  Meds, Allergies, Family History, Social history reviewed    Review of Systems   Constitutional: Negative for chills and fever. Cardiovascular: Negative for chest pain and palpitations. Physical Exam   Constitutional: He appears well-developed and well-nourished. No distress. Nursing note and vitals reviewed.      Visit Vitals    /72 (BP 1 Location: Right arm, BP Patient Position: Sitting)    Pulse 75    Temp 98.5 °F (36.9 °C) (Oral)    Resp 16    Ht 6' (1.829 m)    Wt 219 lb (99.3 kg)  SpO2 96%    BMI 29.7 kg/m2     General appearance: alert, cooperative, no distress, appears stated age  Neck: supple, symmetrical, trachea midline, no adenopathy, thyroid: not enlarged, symmetric, no tenderness/mass/nodules, no carotid bruit and no JVD  Lungs: clear to auscultation bilaterally  Heart: regular rate and rhythm, S1, S2 normal, no murmur, click, rub or gallop  Extremities: extremities normal, atraumatic, no cyanosis or edema    Upper mid back with a flesh tone pinpoint papule; benign characteristics      ASSESSMENT and PLAN    ICD-10-CM ICD-9-CM    1. Diabetes mellitus without complication (HCC) L81.4 250.00    2. Hypercholesteremia E78.00 272.0    3. Skin lesion of back L98.9 709.9 REFERRAL TO DERMATOLOGY         As above, not controlled   treatment plan as listed below  Orders Placed This Encounter    REFERRAL TO DERMATOLOGY    glucose blood VI test strips (ASCENSIA AUTODISC VI, ONE TOUCH ULTRA TEST VI) strip    aspirin delayed-release 81 mg tablet     Diet and exercise to control dm; if not improved consider injectable. Follow-up Disposition:  Return in about 4 months (around 7/13/2018) for DM/chol. An After Visit Summary was printed and given to the patient. This has been fully explained to the patient, who indicates understanding.

## 2018-03-22 RX ORDER — METFORMIN HYDROCHLORIDE 500 MG/1
TABLET ORAL
Qty: 360 TAB | Refills: 2 | Status: SHIPPED | OUTPATIENT
Start: 2018-03-22 | End: 2018-12-17 | Stop reason: SDUPTHER

## 2018-06-25 ENCOUNTER — PATIENT MESSAGE (OUTPATIENT)
Dept: FAMILY MEDICINE CLINIC | Age: 63
End: 2018-06-25

## 2018-06-25 DIAGNOSIS — E11.21 TYPE 2 DIABETES WITH NEPHROPATHY (HCC): Primary | ICD-10-CM

## 2018-06-25 DIAGNOSIS — E78.00 HYPERCHOLESTEROLEMIA: ICD-10-CM

## 2018-06-26 NOTE — TELEPHONE ENCOUNTER
Ronal Wilkinson LPN 6/86/4363 4:31 PM EDT        ----- Message -----   From: Bhavna Philip LPN   Sent: 6/71/3406 9:35 AM   To: Ronal Wilkinson LPN  Subject: FW: Test Results Question         ----- Message -----   From: Kurt James   Sent: 6/25/2018 7:36 AM   To: [facility]domo [facility]es  Subject: Test Results Question     I dont see any lab orders for this week. Have an appointment monday july 2nd.  and coming for labs this thursday 28 th

## 2018-06-29 ENCOUNTER — HOSPITAL ENCOUNTER (OUTPATIENT)
Dept: LAB | Age: 63
Discharge: HOME | End: 2018-06-29
Payer: OTHER GOVERNMENT

## 2018-06-29 DIAGNOSIS — E11.21 TYPE 2 DIABETES WITH NEPHROPATHY (HCC): ICD-10-CM

## 2018-06-29 DIAGNOSIS — E78.00 HYPERCHOLESTEROLEMIA: ICD-10-CM

## 2018-06-29 LAB
ALT SERPL-CCNC: 23 U/L (ref 16–61)
ANION GAP SERPL CALC-SCNC: 7 MMOL/L (ref 3–18)
AST SERPL-CCNC: 10 U/L (ref 15–37)
BUN SERPL-MCNC: 15 MG/DL (ref 7–18)
BUN/CREAT SERPL: 17 (ref 12–20)
CALCIUM SERPL-MCNC: 8.5 MG/DL (ref 8.5–10.1)
CHLORIDE SERPL-SCNC: 105 MMOL/L (ref 100–108)
CHOLEST SERPL-MCNC: 129 MG/DL
CO2 SERPL-SCNC: 26 MMOL/L (ref 21–32)
CREAT SERPL-MCNC: 0.88 MG/DL (ref 0.6–1.3)
EST. AVERAGE GLUCOSE BLD GHB EST-MCNC: 189 MG/DL
GLUCOSE SERPL-MCNC: 203 MG/DL (ref 74–99)
HBA1C MFR BLD: 8.2 % (ref 4.2–5.6)
HDLC SERPL-MCNC: 36 MG/DL (ref 40–60)
HDLC SERPL: 3.6 {RATIO} (ref 0–5)
LDLC SERPL CALC-MCNC: 38.4 MG/DL (ref 0–100)
LIPID PROFILE,FLP: ABNORMAL
POTASSIUM SERPL-SCNC: 4.3 MMOL/L (ref 3.5–5.5)
SODIUM SERPL-SCNC: 138 MMOL/L (ref 136–145)
TRIGL SERPL-MCNC: 273 MG/DL (ref ?–150)
VLDLC SERPL CALC-MCNC: 54.6 MG/DL

## 2018-06-29 PROCEDURE — 82043 UR ALBUMIN QUANTITATIVE: CPT | Performed by: FAMILY MEDICINE

## 2018-06-29 PROCEDURE — 84460 ALANINE AMINO (ALT) (SGPT): CPT | Performed by: FAMILY MEDICINE

## 2018-06-29 PROCEDURE — 83036 HEMOGLOBIN GLYCOSYLATED A1C: CPT | Performed by: FAMILY MEDICINE

## 2018-06-29 PROCEDURE — 36415 COLL VENOUS BLD VENIPUNCTURE: CPT | Performed by: FAMILY MEDICINE

## 2018-06-29 PROCEDURE — 84450 TRANSFERASE (AST) (SGOT): CPT | Performed by: FAMILY MEDICINE

## 2018-06-29 PROCEDURE — 80061 LIPID PANEL: CPT | Performed by: FAMILY MEDICINE

## 2018-06-29 PROCEDURE — 80048 BASIC METABOLIC PNL TOTAL CA: CPT | Performed by: FAMILY MEDICINE

## 2018-06-30 LAB
CREAT UR-MCNC: 142.47 MG/DL (ref 30–125)
MICROALBUMIN UR-MCNC: 8.4 MG/DL (ref 0–3)
MICROALBUMIN/CREAT UR-RTO: 59 MG/G (ref 0–30)

## 2018-07-02 ENCOUNTER — OFFICE VISIT (OUTPATIENT)
Dept: FAMILY MEDICINE CLINIC | Age: 63
End: 2018-07-02

## 2018-07-02 VITALS
RESPIRATION RATE: 16 BRPM | DIASTOLIC BLOOD PRESSURE: 67 MMHG | TEMPERATURE: 98.6 F | HEIGHT: 72 IN | SYSTOLIC BLOOD PRESSURE: 112 MMHG | WEIGHT: 215 LBS | BODY MASS INDEX: 29.12 KG/M2 | HEART RATE: 80 BPM | OXYGEN SATURATION: 99 %

## 2018-07-02 DIAGNOSIS — E11.65 TYPE 2 DIABETES MELLITUS WITH HYPERGLYCEMIA, WITHOUT LONG-TERM CURRENT USE OF INSULIN (HCC): Primary | ICD-10-CM

## 2018-07-02 DIAGNOSIS — N28.89 LEFT RENAL MASS: ICD-10-CM

## 2018-07-02 DIAGNOSIS — E78.00 HYPERCHOLESTEROLEMIA: ICD-10-CM

## 2018-07-02 NOTE — PROGRESS NOTES
HISTORY OF PRESENT ILLNESS  Carmine Hart is a 66 y.o. male. HPI  Patient is here today for evaluation and treatment of: Diabetes/Cholesterol problem    Diabetes:  Pt has had Blood work; A1c was elevated at last check; pt is on metformin and januvia; tolerates med and complies with regimen. Cholesterol: Pt is on zocor; he attempts a lower cholesterol diet. Pt stays active. Pt is also is in need of follow up on a renal mass he has and is followed by urology. No new physical complaints. Current Outpatient Prescriptions:     SITagliptin (JANUVIA) 100 mg tablet, TAKE 1 TABLET DAILY, Disp: 90 Tab, Rfl: 3    dulaglutide (TRULICITY) 2.67 YN/0.2 mL sub-q pen, 0.5 mL by SubCUTAneous route every seven (7) days. , Disp: 3 Pen, Rfl: 6    metFORMIN (GLUCOPHAGE) 500 mg tablet, TAKE 2 TABLETS IN THE MORNING AND 2 TABLETS IN THE EVENING, Disp: 360 Tab, Rfl: 2    glucose blood VI test strips (ASCENSIA AUTODISC VI, ONE TOUCH ULTRA TEST VI) strip, Blood sugar check daily, Disp: 100 Strip, Rfl: 6    aspirin delayed-release 81 mg tablet, Take 81 mg by mouth daily. , Disp: , Rfl:     lisinopril (PRINIVIL, ZESTRIL) 5 mg tablet, TAKE 1 TABLET DAILY, Disp: 90 Tab, Rfl: 3    simvastatin (ZOCOR) 20 mg tablet, TAKE 1 TABLET NIGHTLY, Disp: 90 Tab, Rfl: 3    diazePAM (VALIUM) 5 mg tablet, TAKE ONE TABLET BY MOUTH EVERY 12 HOURS AS NEEDED, Disp: 60 Tab, Rfl: 1    colchicine 0.6 mg tablet, Take 2 tablets X 1 at onset of pain. May repeat 1 tab in one hour prn pain X1, Disp: 30 Tab, Rfl: 0     PMH,  Meds, Allergies, Family History, Social history reviewed    Review of Systems   Constitutional: Negative for chills and fever. Respiratory: Negative for shortness of breath and wheezing. Cardiovascular: Negative for chest pain, palpitations and leg swelling.        Physical Exam   Visit Vitals    /67 (BP 1 Location: Left arm, BP Patient Position: Sitting)    Pulse 80    Temp 98.6 °F (37 °C) (Oral)    Resp 16    Ht 6' (1.829 m)    Wt 215 lb (97.5 kg)    SpO2 99%    BMI 29.16 kg/m2     General appearance: alert, cooperative, no distress, appears stated age  Neck: supple, symmetrical, trachea midline, no adenopathy, thyroid: not enlarged, symmetric, no tenderness/mass/nodules, no carotid bruit and no JVD  Lungs: clear to auscultation bilaterally  Heart: regular rate and rhythm, S1, S2 normal, no murmur, click, rub or gallop  Extremities: extremities normal, atraumatic, no cyanosis or edema  Lab Results   Component Value Date/Time    Cholesterol, total 129 06/29/2018 07:59 AM    Cholesterol (POC) 136 06/18/2013 08:30 AM    HDL Cholesterol 36 (L) 06/29/2018 07:59 AM    HDL Cholesterol (POC) 35 06/18/2013 08:30 AM    LDL Cholesterol (POC) 58 06/18/2013 08:30 AM    LDL, calculated 38.4 06/29/2018 07:59 AM    VLDL, calculated 54.6 06/29/2018 07:59 AM    Triglyceride 273 (H) 06/29/2018 07:59 AM    Triglycerides (POC) 219 06/18/2013 08:30 AM    CHOL/HDL Ratio 3.6 06/29/2018 07:59 AM     Lab Results   Component Value Date/Time    Hemoglobin A1c 8.2 (H) 06/29/2018 07:59 AM    Hemoglobin A1c (POC) 6.4 04/21/2015 08:53 AM         ASSESSMENT and PLAN    ICD-10-CM ICD-9-CM    1. Type 2 diabetes mellitus with hyperglycemia, without long-term current use of insulin ([facility]) E11.65 250.00 SITagliptin (JANUVIA) 100 mg tablet     790.29 dulaglutide (TRULICITY) 2.05 GF/9.4 mL sub-q pen   2. Hypercholesterolemia E78.00 272.0    3. Left renal mass N28.89 593.9 REFERRAL TO UROLOGY       As above, DM is not controlled   treatment plan as listed below  Orders Placed This Encounter    REFERRAL TO UROLOGY    SITagliptin (JANUVIA) 100 mg tablet    dulaglutide (TRULICITY) 9.04 KT/1.8 mL sub-q pen     trulicity as ordered;  Continue lower carb diet  Exercise as tolerated  Follow-up Disposition:  Return in about 2 months (around 4/8/1812) for dm/trulicity. An After Visit Summary was printed and given to the patient.   This has been fully explained to the patient, who indicates understanding.

## 2018-07-02 NOTE — MR AVS SNAPSHOT
[address] [facility] 
 
 
 [address] [address] [address]20 [address] [address] 
[phone] Patient: William Hernandez MRN: [id number] SWP:[id number] Visit Information Date & Time Provider Department Dept. Phone Encounter #  
 7/2/2018  8:40 AM Karen Jimenez, [address] [place] [address] [phone] [id number] Follow-up Instructions Return in about 2 years (around 8/8/0862) for dm/trulicity. Your Appointments 8/8/2018  8:30 AM  
ESTABLISHED PATIENT with Fang Moore MD  
[facility] Appt Note: 1yr F/U, Rev Imaging/Labs- to be done at [facility]; Appt R/S due to [facility] med mtg. Letter mailed to Pt.  
 0780 1700 [address] [id number] [address] 1097 [address] 2301 [facility] 100 200 [facility] Upcoming Health Maintenance Date Due  
 FOOT EXAM Q1 7/6/2018 ZOSTER VACCINE AGE 60> 7/26/2018* Influenza Age 5 to Adult 8/1/2018 EYE EXAM RETINAL OR DILATED Q1 11/16/2018 HEMOGLOBIN A1C Q6M 12/29/2018 MICROALBUMIN Q1 6/29/2019 LIPID PANEL Q1 6/29/2019 DTaP/Tdap/Td series (2 - Td) 3/15/2021 Pneumococcal 19-64 Highest Risk (3 of 3 - PPSV23) 7/6/2022 COLONOSCOPY 4/8/2026 *Topic was postponed. The date shown is not the original due date. Allergies as of 7/2/2018  Review Complete On: 7/2/2018 By: Ra Vásquez LPN No Known Allergies Current Immunizations  Reviewed on 3/6/2017 Name Date Influenza Vaccine (Quad) PF 11/14/2017 Pneumococcal Conjugate (PCV-13) 3/6/2017 TDAP Vaccine 3/15/2011 Not reviewed this visit You Were Diagnosed With   
  
 Codes Comments Type 2 diabetes mellitus with hyperglycemia, without long-term current use of insulin (HCC)    -  Primary ICD-10-CM: E11.65 ICD-9-CM: 250.00, 790.29 Hypercholesterolemia     ICD-10-CM: E78.00 ICD-9-CM: 272.0 Left renal mass     ICD-10-CM: N28.89 ICD-9-CM: 593.9 Vitals BP Pulse Temp Resp Height(growth percentile) Weight(growth percentile) 112/67 (BP 1 Location: Left arm, BP Patient Position: Sitting) 80 98.6 °F (37 °C) (Oral) 16 6' (1.829 m) 215 lb (97.5 kg) SpO2 BMI Smoking Status 99% 29.16 kg/m2 Former Smoker BMI and BSA Data Body Mass Index Body Surface Area  
 29.16 kg/m 2 2.23 m 2 Preferred Pharmacy Pharmacy Name Phone Jordon Cage, [facility] [phone] Your Updated Medication List  
  
   
This list is accurate as of 7/2/18 10:18 AM.  Always use your most recent med list.  
  
  
  
  
 aspirin delayed-release 81 mg tablet Take 81 mg by mouth daily. colchicine 0.6 mg tablet Take 2 tablets X 1 at onset of pain. May repeat 1 tab in one hour prn pain X1  
  
 diazePAM 5 mg tablet Commonly known as:  VALIUM  
TAKE ONE TABLET BY MOUTH EVERY 12 HOURS AS NEEDED  
  
 dulaglutide 0.75 mg/0.5 mL sub-q pen Commonly known as:  TRULICITY  
0.5 mL by SubCUTAneous route every seven (7) days. glucose blood VI test strips strip Commonly known as:  ASCENSIA AUTODISC VI, ONE TOUCH ULTRA TEST VI Blood sugar check daily  
  
 lisinopril 5 mg tablet Commonly known as:  PRINIVIL, ZESTRIL  
TAKE 1 TABLET DAILY  
  
 metFORMIN 500 mg tablet Commonly known as:  GLUCOPHAGE  
TAKE 2 TABLETS IN THE MORNING AND 2 TABLETS IN THE EVENING  
  
 simvastatin 20 mg tablet Commonly known as:  ZOCOR  
TAKE 1 TABLET NIGHTLY SITagliptin 100 mg tablet Commonly known as:  Clem Oats TAKE 1 TABLET DAILY Prescriptions Sent to Pharmacy Refills SITagliptin (JANUVIA) 100 mg tablet 3 Sig: TAKE 1 TABLET DAILY  Class: Normal  
 Pharmacy: [address], [address] [place] [address] Ph #: [phone]  
 dulaglutide (TRULICITY) 8.22 CU/2.5 mL sub-q pen 6  
 Si.5 mL by SubCUTAneous route every seven (7) days. Class: Normal  
 [facility] #: [phone] Route: SubCUTAneous We Performed the Following REFERRAL TO UROLOGY [CDM246 Custom] Comments:  
 Continuity of care. Pt has been under the care of Dr. Eldon Cooper since . Follow-up Instructions Return in about 2 years (around 8061) for dm/trulicity. Referral Information Referral ID Referred By Referred To  
  
 [id number] Jose Antonio Grubbs MD   
   [address] [facility] [address], [address] Phone: [phone] Fax: [phone] Visits Status Start Date End Date 1 New Request 18 If your referral has a status of pending review or denied, additional information will be sent to support the outcome of this decision. Patient Instructions Hemoglobin A1c: About This Test 
What is it? Hemoglobin A1c is a blood test that checks your average blood sugar level over the past 2 to 3 months. This test also is called a glycohemoglobin test or an A1c test. 
Why is this test done? The A1c test is done to check how well your diabetes has been controlled over the past 2 to 3 months. Your doctor can use this information to adjust your medicine and diabetes treatment, if needed. How can you prepare for the test? 
You do not need to stop eating before you have an A1c test. This test can be done at any time during the day, even after a meal. 
What happens during the test? 
The health professional taking a sample of your blood will: · Wrap an elastic band around your upper arm. This makes the veins below the band larger so it is easier to put a needle into the vein. · Clean the needle site with alcohol. · Put the needle into the vein. · Attach a tube to the needle to fill it with blood. · Remove the band from your arm when enough blood is collected. · Put a gauze pad or cotton ball over the needle site as the needle is removed. · Put pressure on the site and then put on a bandage. What else should you know about the test? 
The test result is usually given as a percentage. The normal A1c is less than 5.7%. The A1c test result also can be used to find your estimated average glucose, or eAG. Your eAG and A1c show the same thing in two different ways. They both help you learn more about your average blood sugar range over the past 2 to 3 months. Where can you learn more? Go to http://eddie-dion.info/. Enter U216 in the search box to learn more about \"Hemoglobin A1c: About This Test.\" Current as of: March 13, 2017 Content Version: 11.4 © 3241-1854 [employer]. Care instructions adapted under license by [employer] (which disclaims liability or warranty for this information). If you have questions about a medical condition or this instruction, always ask your healthcare professional. [address] [place] [address] any warranty or liability for your use of this information. Introducing [facility]! Dear Tammi Ricketts: Thank you for requesting a Spredfashion account. Our records indicate that you already have an active Spredfashion account. You can access your account anytime at [email or web address] Did you know that you can access your hospital and ER discharge instructions at any time in Spredfashion? You can also review all of your test results from your hospital stay or ER visit. Additional Information If you have questions, please visit the Frequently Asked Questions section of the Spredfashion website at [email or web address] Remember, Spredfashion is NOT to be used for urgent needs. For medical emergencies, dial 911. Now available from your iPhone and Android! Please provide this summary of care documentation to your next provider. Your primary care clinician is listed as [facility]. If you have any questions after today's visit, please call [phone].

## 2018-07-02 NOTE — PATIENT INSTRUCTIONS
Hemoglobin A1c: About This Test  What is it? Hemoglobin A1c is a blood test that checks your average blood sugar level over the past 2 to 3 months. This test also is called a glycohemoglobin test or an A1c test.  Why is this test done? The A1c test is done to check how well your diabetes has been controlled over the past 2 to 3 months. Your doctor can use this information to adjust your medicine and diabetes treatment, if needed. How can you prepare for the test?  You do not need to stop eating before you have an A1c test. This test can be done at any time during the day, even after a meal.  What happens during the test?  The health professional taking a sample of your blood will:  · Wrap an elastic band around your upper arm. This makes the veins below the band larger so it is easier to put a needle into the vein. · Clean the needle site with alcohol. · Put the needle into the vein. · Attach a tube to the needle to fill it with blood. · Remove the band from your arm when enough blood is collected. · Put a gauze pad or cotton ball over the needle site as the needle is removed. · Put pressure on the site and then put on a bandage. What else should you know about the test?  The test result is usually given as a percentage. The normal A1c is less than 5.7%. The A1c test result also can be used to find your estimated average glucose, or eAG. Your eAG and A1c show the same thing in two different ways. They both help you learn more about your average blood sugar range over the past 2 to 3 months. Where can you learn more? Go to http://eddie-dion.info/. Enter U216 in the search box to learn more about \"Hemoglobin A1c: About This Test.\"  Current as of: March 13, 2017  Content Version: 11.4  © 5947-7206 [employer]. Care instructions adapted under license by [employer] (which disclaims liability or warranty for this information).  If you have questions about a medical condition or this instruction, always ask your healthcare professional. [place] [address] any warranty or liability for your use of this information.

## 2018-08-01 ENCOUNTER — HOSPITAL ENCOUNTER (OUTPATIENT)
Dept: ULTRASOUND IMAGING | Age: 63
Discharge: HOME | End: 2018-08-01
Attending: UROLOGY
Payer: OTHER GOVERNMENT

## 2018-08-01 ENCOUNTER — HOSPITAL ENCOUNTER (OUTPATIENT)
Dept: LAB | Age: 63
Discharge: HOME | End: 2018-08-01
Attending: UROLOGY
Payer: OTHER GOVERNMENT

## 2018-08-01 ENCOUNTER — HOSPITAL ENCOUNTER (OUTPATIENT)
Dept: GENERAL RADIOLOGY | Age: 63
Discharge: HOME | End: 2018-08-01
Attending: UROLOGY
Payer: OTHER GOVERNMENT

## 2018-08-01 DIAGNOSIS — C64.2 RENAL CELL CARCINOMA OF LEFT KIDNEY (HCC): ICD-10-CM

## 2018-08-01 DIAGNOSIS — N28.1 RENAL CYST, RIGHT: ICD-10-CM

## 2018-08-01 DIAGNOSIS — N28.1 SIMPLE RENAL CYST: ICD-10-CM

## 2018-08-01 LAB
ANION GAP SERPL CALC-SCNC: 8 MMOL/L (ref 3–18)
BUN SERPL-MCNC: 17 MG/DL (ref 7–18)
BUN/CREAT SERPL: 15 (ref 12–20)
CALCIUM SERPL-MCNC: 9.3 MG/DL (ref 8.5–10.1)
CHLORIDE SERPL-SCNC: 105 MMOL/L (ref 100–108)
CO2 SERPL-SCNC: 27 MMOL/L (ref 21–32)
CREAT SERPL-MCNC: 1.13 MG/DL (ref 0.6–1.3)
ERYTHROCYTE [DISTWIDTH] IN BLOOD BY AUTOMATED COUNT: 12.1 % (ref 11.6–14.5)
GLUCOSE SERPL-MCNC: 212 MG/DL (ref 74–99)
HCT VFR BLD AUTO: 38.9 % (ref 36–48)
HGB BLD-MCNC: 13.1 G/DL (ref 13–16)
MCH RBC QN AUTO: 30.7 PG (ref 24–34)
MCHC RBC AUTO-ENTMCNC: 33.7 G/DL (ref 31–37)
MCV RBC AUTO: 91.1 FL (ref 74–97)
PLATELET # BLD AUTO: 318 K/UL (ref 135–420)
PMV BLD AUTO: 10.7 FL (ref 9.2–11.8)
POTASSIUM SERPL-SCNC: 4.3 MMOL/L (ref 3.5–5.5)
RBC # BLD AUTO: 4.27 M/UL (ref 4.7–5.5)
SODIUM SERPL-SCNC: 140 MMOL/L (ref 136–145)
WBC # BLD AUTO: 7 K/UL (ref 4.6–13.2)

## 2018-08-01 PROCEDURE — 85027 COMPLETE CBC AUTOMATED: CPT | Performed by: UROLOGY

## 2018-08-01 PROCEDURE — 76770 US EXAM ABDO BACK WALL COMP: CPT

## 2018-08-01 PROCEDURE — 80048 BASIC METABOLIC PNL TOTAL CA: CPT | Performed by: UROLOGY

## 2018-08-01 PROCEDURE — 36415 COLL VENOUS BLD VENIPUNCTURE: CPT | Performed by: UROLOGY

## 2018-08-01 PROCEDURE — 71046 X-RAY EXAM CHEST 2 VIEWS: CPT

## 2018-10-10 RX ORDER — SIMVASTATIN 20 MG/1
TABLET, FILM COATED ORAL
Qty: 90 TAB | Refills: 3 | Status: SHIPPED | OUTPATIENT
Start: 2018-10-10 | End: 2019-10-05 | Stop reason: SDUPTHER

## 2018-10-16 ENCOUNTER — HOSPITAL ENCOUNTER (OUTPATIENT)
Dept: LAB | Age: 63
Discharge: HOME | End: 2018-10-16
Payer: OTHER GOVERNMENT

## 2018-10-16 DIAGNOSIS — E11.21 TYPE 2 DIABETES WITH NEPHROPATHY (HCC): ICD-10-CM

## 2018-10-16 DIAGNOSIS — E78.00 HYPERCHOLESTEROLEMIA: ICD-10-CM

## 2018-10-16 DIAGNOSIS — E11.21 TYPE 2 DIABETES WITH NEPHROPATHY (HCC): Primary | ICD-10-CM

## 2018-10-16 LAB
ALT SERPL-CCNC: 28 U/L (ref 16–61)
ANION GAP SERPL CALC-SCNC: 8 MMOL/L (ref 3–18)
AST SERPL-CCNC: 11 U/L (ref 15–37)
BUN SERPL-MCNC: 14 MG/DL (ref 7–18)
BUN/CREAT SERPL: 14 (ref 12–20)
CALCIUM SERPL-MCNC: 9.4 MG/DL (ref 8.5–10.1)
CHLORIDE SERPL-SCNC: 102 MMOL/L (ref 100–108)
CO2 SERPL-SCNC: 28 MMOL/L (ref 21–32)
CREAT SERPL-MCNC: 1.02 MG/DL (ref 0.6–1.3)
EST. AVERAGE GLUCOSE BLD GHB EST-MCNC: 200 MG/DL
GLUCOSE SERPL-MCNC: 219 MG/DL (ref 74–99)
HBA1C MFR BLD: 8.6 % (ref 4.2–5.6)
POTASSIUM SERPL-SCNC: 4.7 MMOL/L (ref 3.5–5.5)
SODIUM SERPL-SCNC: 138 MMOL/L (ref 136–145)

## 2018-10-16 PROCEDURE — 80048 BASIC METABOLIC PNL TOTAL CA: CPT | Performed by: FAMILY MEDICINE

## 2018-10-16 PROCEDURE — 36415 COLL VENOUS BLD VENIPUNCTURE: CPT | Performed by: FAMILY MEDICINE

## 2018-10-16 PROCEDURE — 84460 ALANINE AMINO (ALT) (SGPT): CPT | Performed by: FAMILY MEDICINE

## 2018-10-16 PROCEDURE — 84450 TRANSFERASE (AST) (SGOT): CPT | Performed by: FAMILY MEDICINE

## 2018-10-16 PROCEDURE — 83036 HEMOGLOBIN GLYCOSYLATED A1C: CPT | Performed by: FAMILY MEDICINE

## 2018-11-06 ENCOUNTER — OFFICE VISIT (OUTPATIENT)
Dept: FAMILY MEDICINE CLINIC | Age: 63
End: 2018-11-06

## 2018-11-06 VITALS
SYSTOLIC BLOOD PRESSURE: 110 MMHG | TEMPERATURE: 98.7 F | HEART RATE: 79 BPM | HEIGHT: 72 IN | DIASTOLIC BLOOD PRESSURE: 58 MMHG | RESPIRATION RATE: 16 BRPM | WEIGHT: 216 LBS | BODY MASS INDEX: 29.26 KG/M2 | OXYGEN SATURATION: 96 %

## 2018-11-06 DIAGNOSIS — M51.36 DDD (DEGENERATIVE DISC DISEASE), LUMBAR: ICD-10-CM

## 2018-11-06 DIAGNOSIS — I10 ESSENTIAL HYPERTENSION: ICD-10-CM

## 2018-11-06 DIAGNOSIS — E11.21 TYPE 2 DIABETES WITH NEPHROPATHY (HCC): ICD-10-CM

## 2018-11-06 DIAGNOSIS — E78.00 HYPERCHOLESTEROLEMIA: Primary | ICD-10-CM

## 2018-11-06 RX ORDER — DIAZEPAM 5 MG/1
TABLET ORAL
Qty: 60 TAB | Refills: 1 | Status: SHIPPED | OUTPATIENT
Start: 2018-11-06 | End: 2019-07-03 | Stop reason: SDUPTHER

## 2018-11-06 NOTE — PATIENT INSTRUCTIONS
Diabetes Foot Health: Care Instructions Your Care Instructions When you have diabetes, your feet need extra care and attention. Diabetes can damage the nerve endings and blood vessels in your feet, making you less likely to notice when your feet are injured. Diabetes also limits your body's ability to fight infection and get blood to areas that need it. If you get a minor foot injury, it could become an ulcer or a serious infection. With good foot care, you can prevent most of these problems. Caring for your feet can be quick and easy. Most of the care can be done when you are bathing or getting ready for bed. Follow-up care is a key part of your treatment and safety. Be sure to make and go to all appointments, and call your doctor if you are having problems. It's also a good idea to know your test results and keep a list of the medicines you take. How can you care for yourself at home? · Keep your blood sugar close to normal by watching what and how much you eat, monitoring blood sugar, taking medicines if prescribed, and getting regular exercise. · Do not smoke. Smoking affects blood flow and can make foot problems worse. If you need help quitting, talk to your doctor about stop-smoking programs and medicines. These can increase your chances of quitting for good. · Eat a diet that is low in fats. High fat intake can cause fat to build up in your blood vessels and decrease blood flow. · Inspect your feet daily for blisters, cuts, cracks, or sores. If you cannot see well, use a mirror or have someone help you. · Take care of your feet: 
? Wash your feet every day. Use warm (not hot) water. Check the water temperature with your wrists or other part of your body, not your feet. ? Dry your feet well. Pat them dry. Do not rub the skin on your feet too hard. Dry well between your toes. If the skin on your feet stays moist, bacteria or a fungus can grow, which can lead to infection. ? Keep your skin soft. Use moisturizing skin cream to keep the skin on your feet soft and prevent calluses and cracks. But do not put the cream between your toes, and stop using any cream that causes a rash. ? Clean underneath your toenails carefully. Do not use a sharp object to clean underneath your toenails. Use the blunt end of a nail file or other rounded tool. ? Trim and file your toenails straight across to prevent ingrown toenails. Use a nail clipper, not scissors. Use an emery board to smooth the edges. · Change socks daily. Socks without seams are best, because seams often rub the feet. You can find socks for people with diabetes from specialty catalogs. · Look inside your shoes every day for things like gravel or torn linings, which could cause blisters or sores. · Buy shoes that fit well: 
? Look for shoes that have plenty of space around the toes. This helps prevent bunions and blisters. ? Try on shoes while wearing the kind of socks you will usually wear with the shoes. ? Avoid plastic shoes. They may rub your feet and cause blisters. Good shoes should be made of materials that are flexible and breathable, such as leather or cloth. ? Break in new shoes slowly by wearing them for no more than an hour a day for several days. Take extra time to check your feet for red areas, blisters, or other problems after you wear new shoes. · Do not go barefoot. Do not wear sandals, and do not wear shoes with very thin soles. Thin soles are easy to puncture. They also do not protect your feet from hot pavement or cold weather. · Have your doctor check your feet during each visit. If you have a foot problem, see your doctor. Do not try to treat an early foot problem at home. Home remedies or treatments that you can buy without a prescription (such as corn removers) can be harmful. · Always get early treatment for foot problems. A minor irritation can lead to a major problem if not properly cared for early. When should you call for help? Call your doctor now or seek immediate medical care if: 
  · You have a foot sore, an ulcer or break in the skin that is not healing after 4 days, bleeding corns or calluses, or an ingrown toenail.  
  · You have blue or black areas, which can mean bruising or blood flow problems.  
  · You have peeling skin or tiny blisters between your toes or cracking or oozing of the skin.  
  · You have a fever for more than 24 hours and a foot sore.  
  · You have new numbness or tingling in your feet that does not go away after you move your feet or change positions.  
  · You have unexplained or unusual swelling of the foot or ankle.  
 Watch closely for changes in your health, and be sure to contact your doctor if: 
  · You cannot do proper foot care. Where can you learn more? Go to http://eddie-dion.info/. Enter A739 in the search box to learn more about \"Diabetes Foot Health: Care Instructions. \" Current as of: December 7, 2017 Content Version: 11.8 © 9261-4892 Healthwise, Incorporated. Care instructions adapted under license by [employer] (which disclaims liability or warranty for this information). If you have questions about a medical condition or this instruction, always ask your healthcare professional. [address] any warranty or liability for your use of this information.

## 2018-11-06 NOTE — PROGRESS NOTES
HISTORY OF PRESENT ILLNESS Amber Cedeno is a 66 y.o. male. HPI Patient is here today for evaluation and treatment of: Diabetes Diabetes:   Pt was unable to start trulicity and bydureon due to cost;  He has started a plant based diet and his blood sugars have been much improved. Blood sugars have been in the 130s- 140s. He has been exercising; states his feet have been tingling a lot. He has had his blood work and is here to review the same. Pt has a h/o DDD of the lumbar spine for which he uses valium for muscle relaxation. He needs a refill. Pt also has HTN;  He is on meds as listed below. He is on zocor for hypercholesterolemia Wt Readings from Last 3 Encounters:  
11/06/18 216 lb (98 kg) 08/08/18 210 lb (95.3 kg) 07/02/18 215 lb (97.5 kg) Current Outpatient Medications:  
  diazePAM (VALIUM) 5 mg tablet, TAKE ONE TABLET BY MOUTH EVERY 12 HOURS AS NEEDED, Disp: 60 Tab, Rfl: 1   simvastatin (ZOCOR) 20 mg tablet, TAKE 1 TABLET NIGHTLY, Disp: 90 Tab, Rfl: 3 
  SITagliptin (JANUVIA) 100 mg tablet, TAKE 1 TABLET DAILY, Disp: 90 Tab, Rfl: 3 
  metFORMIN (GLUCOPHAGE) 500 mg tablet, TAKE 2 TABLETS IN THE MORNING AND 2 TABLETS IN THE EVENING, Disp: 360 Tab, Rfl: 2 
  glucose blood VI test strips (ASCENSIA AUTODISC VI, ONE TOUCH ULTRA TEST VI) strip, Blood sugar check daily, Disp: 100 Strip, Rfl: 6 
  aspirin delayed-release 81 mg tablet, Take 81 mg by mouth daily. , Disp: , Rfl:  
  lisinopril (PRINIVIL, ZESTRIL) 5 mg tablet, TAKE 1 TABLET DAILY, Disp: 90 Tab, Rfl: 3 
  colchicine 0.6 mg tablet, Take 2 tablets X 1 at onset of pain. May repeat 1 tab in one hour prn pain X1, Disp: 30 Tab, Rfl: 0 
 
 
PMH,  Meds, Allergies, Family History, Social history reviewed Review of Systems Constitutional: Negative for chills and fever. Cardiovascular: Negative for chest pain and palpitations. Physical Exam  
Visit Vitals /58 Pulse 79 Temp 98.7 °F (37.1 °C) (Oral) Resp 16 Ht 6' (1.829 m) Wt 216 lb (98 kg) SpO2 96% BMI 29.29 kg/m² General appearance: alert, cooperative, no distress, appears stated age Neck: supple, symmetrical, trachea midline, no adenopathy, thyroid: not enlarged, symmetric, no tenderness/mass/nodules, no carotid bruit and no JVD Lungs: clear to auscultation bilaterally Heart: regular rate and rhythm, S1, S2 normal, no murmur, click, rub or gallop Extremities: extremities normal, atraumatic, no cyanosis or edema Sensory exam of the foot is normal, tested with the monofilament with the exception of some decreased sensation at the periphery of the right foot. Good pulses, no lesions or ulcers, good peripheral pulses. Lab Results Component Value Date/Time Cholesterol, total 129 06/29/2018 07:59 AM  
 Cholesterol (POC) 136 06/18/2013 08:30 AM  
 HDL Cholesterol 36 (L) 06/29/2018 07:59 AM  
 HDL Cholesterol (POC) 35 06/18/2013 08:30 AM  
 LDL Cholesterol (POC) 58 06/18/2013 08:30 AM  
 LDL, calculated 38.4 06/29/2018 07:59 AM  
 VLDL, calculated 54.6 06/29/2018 07:59 AM  
 Triglyceride 273 (H) 06/29/2018 07:59 AM  
 Triglycerides (POC) 219 06/18/2013 08:30 AM  
 CHOL/HDL Ratio 3.6 06/29/2018 07:59 AM  
 
Lab Results Component Value Date/Time Sodium 138 10/16/2018 05:00 PM  
 Potassium 4.7 10/16/2018 05:00 PM  
 Chloride 102 10/16/2018 05:00 PM  
 CO2 28 10/16/2018 05:00 PM  
 Anion gap 8 10/16/2018 05:00 PM  
 Glucose 219 (H) 10/16/2018 05:00 PM  
 BUN 14 10/16/2018 05:00 PM  
 Creatinine 1.02 10/16/2018 05:00 PM  
 BUN/Creatinine ratio 14 10/16/2018 05:00 PM  
 GFR est AA >60 10/16/2018 05:00 PM  
 GFR est non-AA >60 10/16/2018 05:00 PM  
 Calcium 9.4 10/16/2018 05:00 PM  
 
 
ASSESSMENT and PLAN 
  ICD-10-CM ICD-9-CM 1. Hypercholesterolemia E78.00 272.0 2. Type 2 diabetes with nephropathy (HCC) E11.21 250.40  DIABETES FOOT EXAM  
  583.81 HEMOGLOBIN A1C WITH EAG 3. Essential hypertension I10 401.9 4. DDD (degenerative disc disease), lumbar M51.36 722.52 diazePAM (VALIUM) 5 mg tablet As above, not fully controlled 
 treatment plan as listed below Orders Placed This Encounter  HEMOGLOBIN A1C WITH EAG  
 HM DIABETES FOOT EXAM  
 diazePAM (VALIUM) 5 mg tablet Pt motivated to continue his plant based diet to help control his blood sugars. If not improved at follow up will need to institute another med regimen or consider endocrine consult. Check A1c in 2 months;  Orders placed. Will plan to then place order for ALL labs 2 months later per pt request for anticipated follow up face to face exam 
Follow-up Disposition: 
Return in about 3 months (around 2/6/2019) for dm/htn/chol. An After Visit Summary was printed and given to the patient. This has been fully explained to the patient, who indicates understanding.

## 2018-11-06 NOTE — PROGRESS NOTES
1. Have you been to the ER, urgent care clinic since your last visit? Hospitalized since your last visit? No 
 
2. Have you seen or consulted any other health care providers outside of the [address] [place] [address] since your last visit? Include any pap smears or colon screening.  No

## 2018-11-29 RX ORDER — BLOOD-GLUCOSE CONTROL, NORMAL
EACH MISCELLANEOUS
Qty: 100 LANCET | Refills: 3 | Status: SHIPPED | OUTPATIENT
Start: 2018-11-29 | End: 2020-01-29 | Stop reason: SDUPTHER

## 2018-12-20 RX ORDER — METFORMIN HYDROCHLORIDE 500 MG/1
TABLET ORAL
Qty: 360 TAB | Refills: 2 | Status: SHIPPED | OUTPATIENT
Start: 2018-12-20 | End: 2019-09-13 | Stop reason: SDUPTHER

## 2018-12-24 ENCOUNTER — HOSPITAL ENCOUNTER (OUTPATIENT)
Dept: LAB | Age: 63
Discharge: HOME | End: 2018-12-24
Payer: OTHER GOVERNMENT

## 2018-12-24 DIAGNOSIS — E11.21 TYPE 2 DIABETES WITH NEPHROPATHY (HCC): ICD-10-CM

## 2018-12-24 LAB
EST. AVERAGE GLUCOSE BLD GHB EST-MCNC: 157 MG/DL
HBA1C MFR BLD: 7.1 % (ref 4.2–5.6)

## 2018-12-24 PROCEDURE — 83036 HEMOGLOBIN GLYCOSYLATED A1C: CPT

## 2018-12-24 PROCEDURE — 36415 COLL VENOUS BLD VENIPUNCTURE: CPT

## 2019-01-30 DIAGNOSIS — E11.65 TYPE 2 DIABETES MELLITUS WITH HYPERGLYCEMIA, WITHOUT LONG-TERM CURRENT USE OF INSULIN (HCC): Primary | ICD-10-CM

## 2019-01-30 DIAGNOSIS — E78.00 HYPERCHOLESTEROLEMIA: ICD-10-CM

## 2019-02-01 ENCOUNTER — HOSPITAL ENCOUNTER (OUTPATIENT)
Dept: LAB | Age: 64
Discharge: HOME | End: 2019-02-01
Payer: OTHER GOVERNMENT

## 2019-02-01 DIAGNOSIS — E78.00 HYPERCHOLESTEROLEMIA: ICD-10-CM

## 2019-02-01 DIAGNOSIS — E11.65 TYPE 2 DIABETES MELLITUS WITH HYPERGLYCEMIA, WITHOUT LONG-TERM CURRENT USE OF INSULIN (HCC): ICD-10-CM

## 2019-02-01 LAB
ALT SERPL-CCNC: 22 U/L (ref 16–61)
ANION GAP SERPL CALC-SCNC: 8 MMOL/L (ref 3–18)
AST SERPL-CCNC: 14 U/L (ref 15–37)
BUN SERPL-MCNC: 19 MG/DL (ref 7–18)
BUN/CREAT SERPL: 19 (ref 12–20)
CALCIUM SERPL-MCNC: 9.1 MG/DL (ref 8.5–10.1)
CHLORIDE SERPL-SCNC: 105 MMOL/L (ref 100–108)
CHOLEST SERPL-MCNC: 131 MG/DL
CO2 SERPL-SCNC: 27 MMOL/L (ref 21–32)
CREAT SERPL-MCNC: 0.98 MG/DL (ref 0.6–1.3)
EST. AVERAGE GLUCOSE BLD GHB EST-MCNC: 151 MG/DL
GLUCOSE SERPL-MCNC: 133 MG/DL (ref 74–99)
HBA1C MFR BLD: 6.9 % (ref 4.2–5.6)
HDLC SERPL-MCNC: 43 MG/DL (ref 40–60)
HDLC SERPL: 3 {RATIO} (ref 0–5)
LDLC SERPL CALC-MCNC: 68.8 MG/DL (ref 0–100)
LIPID PROFILE,FLP: NORMAL
POTASSIUM SERPL-SCNC: 4.2 MMOL/L (ref 3.5–5.5)
SODIUM SERPL-SCNC: 140 MMOL/L (ref 136–145)
TRIGL SERPL-MCNC: 96 MG/DL (ref ?–150)
VLDLC SERPL CALC-MCNC: 19.2 MG/DL

## 2019-02-01 PROCEDURE — 80048 BASIC METABOLIC PNL TOTAL CA: CPT

## 2019-02-01 PROCEDURE — 83036 HEMOGLOBIN GLYCOSYLATED A1C: CPT

## 2019-02-01 PROCEDURE — 36415 COLL VENOUS BLD VENIPUNCTURE: CPT

## 2019-02-01 PROCEDURE — 84460 ALANINE AMINO (ALT) (SGPT): CPT

## 2019-02-01 PROCEDURE — 84450 TRANSFERASE (AST) (SGOT): CPT

## 2019-02-01 PROCEDURE — 80061 LIPID PANEL: CPT

## 2019-02-05 ENCOUNTER — OFFICE VISIT (OUTPATIENT)
Dept: FAMILY MEDICINE CLINIC | Age: 64
End: 2019-02-05

## 2019-02-05 VITALS
BODY MASS INDEX: 28.44 KG/M2 | HEIGHT: 72 IN | RESPIRATION RATE: 18 BRPM | OXYGEN SATURATION: 98 % | TEMPERATURE: 98.2 F | HEART RATE: 68 BPM | SYSTOLIC BLOOD PRESSURE: 123 MMHG | DIASTOLIC BLOOD PRESSURE: 74 MMHG | WEIGHT: 210 LBS

## 2019-02-05 DIAGNOSIS — I10 ESSENTIAL HYPERTENSION: ICD-10-CM

## 2019-02-05 DIAGNOSIS — E78.00 HYPERCHOLESTEROLEMIA: ICD-10-CM

## 2019-02-05 DIAGNOSIS — E11.21 TYPE 2 DIABETES WITH NEPHROPATHY (HCC): Primary | ICD-10-CM

## 2019-02-05 NOTE — PROGRESS NOTES
HISTORY OF PRESENT ILLNESS  Dixon Yung is a 60 y.o. male. HPI     Patient is here today for evaluation and treatment of: Diabetes/Hypertension/Cholesterol problem    Diabetes: This is better; He has changed his diet. He has had his labs done and has reviewed them on my chart. He is on med as listed below; He complies with med regimen. He has lost some weight. Wt Readings from Last 3 Encounters:   02/05/19 210 lb (95.3 kg)   11/06/18 216 lb (98 kg)   08/08/18 210 lb (95.3 kg)         Hypertension:  BP is stable. His has lisinopril prescribed. Cholesterol:  Chol is stable. He continues on zocor; Tomás Boyd He stays active. He plans on applying for further service connected disability coverage. He has not gotten paperwork for this as of yet but plans to do so in the near future. Pt asked to schedule an appointment when he is ready to have this done. Current Outpatient Medications:     metFORMIN (GLUCOPHAGE) 500 mg tablet, TAKE 2 TABLETS IN THE MORNING AND 2 TABLETS IN THE EVENING, Disp: 360 Tab, Rfl: 2    lancets (BD ULTRA-FINE II LANCETS) 30 gauge misc, Blood sugar check daily, Disp: 100 Lancet, Rfl: 3    diazePAM (VALIUM) 5 mg tablet, TAKE ONE TABLET BY MOUTH EVERY 12 HOURS AS NEEDED, Disp: 60 Tab, Rfl: 1    simvastatin (ZOCOR) 20 mg tablet, TAKE 1 TABLET NIGHTLY, Disp: 90 Tab, Rfl: 3    SITagliptin (JANUVIA) 100 mg tablet, TAKE 1 TABLET DAILY, Disp: 90 Tab, Rfl: 3    glucose blood VI test strips (ASCENSIA AUTODISC VI, ONE TOUCH ULTRA TEST VI) strip, Blood sugar check daily, Disp: 100 Strip, Rfl: 6    lisinopril (PRINIVIL, ZESTRIL) 5 mg tablet, TAKE 1 TABLET DAILY, Disp: 90 Tab, Rfl: 3    colchicine 0.6 mg tablet, Take 2 tablets X 1 at onset of pain. May repeat 1 tab in one hour prn pain X1, Disp: 30 Tab, Rfl: 0    aspirin delayed-release 81 mg tablet, Take 81 mg by mouth daily. , Disp: , Rfl:   PMH,  Meds, Allergies, Family History, Social history reviewed      Review of Systems Constitutional: Negative for chills and fever. Respiratory: Negative for shortness of breath and wheezing. Cardiovascular: Negative for chest pain and palpitations.        Physical Exam   Visit Vitals  /74 (BP 1 Location: Left arm, BP Patient Position: Sitting)   Pulse 68   Temp 98.2 °F (36.8 °C) (Oral)   Resp 18   Ht 6' (1.829 m)   Wt 210 lb (95.3 kg)   SpO2 98%   BMI 28.48 kg/m²     General appearance: alert, cooperative, no distress, appears stated age  Neck: supple, symmetrical, trachea midline, no adenopathy, thyroid: not enlarged, symmetric, no tenderness/mass/nodules, no carotid bruit and no JVD  Lungs: clear to auscultation bilaterally  Heart: regular rate and rhythm, S1, S2 normal, no murmur, click, rub or gallop  Extremities: extremities normal, atraumatic, no cyanosis or edema    Lab Results   Component Value Date/Time    Hemoglobin A1c 6.9 (H) 02/01/2019 08:38 AM    Hemoglobin A1c (POC) 6.4 04/21/2015 08:53 AM     Lab Results   Component Value Date/Time    Sodium 140 02/01/2019 08:38 AM    Potassium 4.2 02/01/2019 08:38 AM    Chloride 105 02/01/2019 08:38 AM    CO2 27 02/01/2019 08:38 AM    Anion gap 8 02/01/2019 08:38 AM    Glucose 133 (H) 02/01/2019 08:38 AM    BUN 19 (H) 02/01/2019 08:38 AM    Creatinine 0.98 02/01/2019 08:38 AM    BUN/Creatinine ratio 19 02/01/2019 08:38 AM    GFR est AA >60 02/01/2019 08:38 AM    GFR est non-AA >60 02/01/2019 08:38 AM    Calcium 9.1 02/01/2019 08:38 AM     Lab Results   Component Value Date/Time    Cholesterol, total 131 02/01/2019 08:38 AM    Cholesterol (POC) 136 06/18/2013 08:30 AM    HDL Cholesterol 43 02/01/2019 08:38 AM    HDL Cholesterol (POC) 35 06/18/2013 08:30 AM    LDL Cholesterol (POC) 58 06/18/2013 08:30 AM    LDL, calculated 68.8 02/01/2019 08:38 AM    VLDL, calculated 19.2 02/01/2019 08:38 AM    Triglyceride 96 02/01/2019 08:38 AM    Triglycerides (POC) 219 06/18/2013 08:30 AM    CHOL/HDL Ratio 3.0 02/01/2019 08:38 AM       ASSESSMENT and PLAN    ICD-10-CM ICD-9-CM    1. Type 2 diabetes with nephropathy (HCC) E11.21 250.40 HEMOGLOBIN A1C WITH EAG     583.81    2. Hypercholesterolemia E78.00 272.0 LIPID PANEL      AST      ALT   3. Essential hypertension J36 721.0 METABOLIC PANEL, BASIC       As above,   above all stable unless otherwise noted   treatment plan as listed below  Orders Placed This Encounter    LIPID PANEL    METABOLIC PANEL, BASIC    HEMOGLOBIN A1C WITH EAG    AST    ALT     Follow-up Disposition:  Return in about 4 months (around 6/5/2019) for well exam.  An After Visit Summary was printed and given to the patient. This has been fully explained to the patient, who indicates understanding.   Future labs placed in record for use prior to next visit per pt request.

## 2019-02-05 NOTE — PATIENT INSTRUCTIONS
Pneumococcal Polysaccharide Vaccine: What You Need to Know  Why get vaccinated? Vaccination can protect older adults (and some children and younger adults) from pneumococcal disease. Pneumococcal disease is caused by bacteria that can spread from person to person through close contact. It can cause ear infections, and it can also lead to more serious infections of the:  · Lungs (pneumonia),  · Blood (bacteremia), and  · Covering of the brain and spinal cord (meningitis). Meningitis can cause deafness and brain damage, and it can be fatal.  Anyone can get pneumococcal disease, but children under 3years of age, people with certain medical conditions, adults over 72years of age, and cigarette smokers are at the highest risk. About 18,000 older adults die each year from pneumococcal disease in the [place]. Treatment of pneumococcal infections with penicillin and other drugs used to be more effective. But some strains of the disease have become resistant to these drugs. This makes prevention of the disease, through vaccination, even more important. Pneumococcal polysaccharide vaccine (PPSV23)  Pneumococcal polysaccharide vaccine (PPSV23) protects against 23 types of pneumococcal bacteria. It will not prevent all pneumococcal disease. PPSV23 is recommended for:  · All adults 72years of age and older,  · Anyone 2 through 59years of age with certain long-term health problems,  · Anyone 2 through 59years of age with a weakened immune system,  · Adults 23 through 59years of age who smoke cigarettes or have asthma. Most people need only one dose of PPSV. A second dose is recommended for certain high-risk groups. People 72 and older should get a dose even if they have gotten one or more doses of the vaccine before they turned 65. Your healthcare provider can give you more information about these recommendations. Most healthy adults develop protection within 2 to 3 weeks of getting the shot.   Some people should not get this vaccine  · Anyone who has had a life-threatening allergic reaction to PPSV should not get another dose. · Anyone who has a severe allergy to any component of PPSV should not receive it. Tell your provider if you have any severe allergies. · Anyone who is moderately or severely ill when the shot is scheduled may be asked to wait until they recover before getting the vaccine. Someone with a mild illness can usually be vaccinated. · Children less than 3years of age should not receive this vaccine. · There is no evidence that PPSV is harmful to either a pregnant woman or to her fetus. However, as a precaution, women who need the vaccine should be vaccinated before becoming pregnant, if possible. Risks of a vaccine reaction  With any medicine, including vaccines, there is a chance of side effects. These are usually mild and go away on their own, but serious reactions are also possible. About half of people who get PPSV have mild side effects, such as redness or pain where the shot is given, which go away within about two days. Less than 1 out of 100 people develop a fever, muscle aches, or more severe local reactions. Problems that could happen after any vaccine:  · People sometimes faint after a medical procedure, including vaccination. Sitting or lying down for about 15 minutes can help prevent fainting, and injuries caused by a fall. Tell your doctor if you feel dizzy, or have vision changes or ringing in the ears. · Some people get severe pain in the shoulder and have difficulty moving the arm where a shot was given. This happens very rarely. · Any medication can cause a severe allergic reaction. Such reactions from a vaccine are very rare, estimated at about 1 in a million doses, and would happen within a few minutes to a few hours after the vaccination. As with any medicine, there is a very remote chance of a vaccine causing a serious injury or death.   The safety of vaccines is always being monitored. For more information, visit: www.cdc.gov/vaccinesafety/  What if there is a serious reaction? What should I look for? Look for anything that concerns you, such as signs of a severe allergic reaction, very high fever, or unusual behavior. Signs of a severe allergic reaction can include hives, swelling of the face and throat, difficulty breathing, a fast heartbeat, dizziness, and weakness. These would usually start a few minutes to a few hours after the vaccination. What should I do? If you think it is a severe allergic reaction or other emergency that can't wait, call 9-1-1 or get to the nearest hospital. Otherwise, call your doctor. Afterward, the reaction should be reported to the Vaccine Adverse Event Reporting System (VAERS). Your doctor might file this report, or you can do it yourself through the VAERS web site at www.vaers. Luxr.gov, or by calling 8-736.695.4062. [employer] does not give medical advice. How can I learn more? · Ask your doctor. He or she can give you the vaccine package insert or suggest other sources of information. · Call your local or state health department. · Contact the Centers for Disease Control and Prevention (CDC):  ? Call 6-969.943.4140 (1-800-CDC-INFO) or  ? Visit CDC's website at www.cdc.gov/vaccines  Vaccine Information Statement  PPSV Vaccine  (04/24/2015)  Department of Health and Human Services  Centers for Disease Control and Prevention  Many Vaccine Information Statements are available in [place] and other languages. See www.immunize.org/vis. Hojas de información Sobre Vacunas están disponibles en español y en muchos otros idiomas. Visite F[employer]. Care instructions adapted under license by [employer] (which disclaims liability or warranty for this information).  If you have questions about a medical condition or this instruction, always ask your healthcare professional. Jessi Stanley any warranty or liability for your use of this information.

## 2019-02-05 NOTE — PROGRESS NOTES
Patient here for a HTN, diabetes and cholesterol follow up Follow up. 1. Have you been to the ER, urgent care clinic since your last visit? Hospitalized since your last visit? No    2. Have you seen or consulted any other health care providers outside of the [address] [place] [address] since your last visit? Include any pap smears or colon screening.  No

## 2019-04-01 RX ORDER — LISINOPRIL 5 MG/1
5 TABLET ORAL DAILY
Qty: 90 TAB | Refills: 1 | Status: SHIPPED | OUTPATIENT
Start: 2019-04-01 | End: 2019-09-28 | Stop reason: SDUPTHER

## 2019-06-28 ENCOUNTER — HOSPITAL ENCOUNTER (OUTPATIENT)
Dept: LAB | Age: 64
Discharge: HOME | End: 2019-06-28
Payer: OTHER GOVERNMENT

## 2019-06-28 DIAGNOSIS — E11.65 TYPE 2 DIABETES MELLITUS WITH HYPERGLYCEMIA, WITHOUT LONG-TERM CURRENT USE OF INSULIN (HCC): ICD-10-CM

## 2019-06-28 DIAGNOSIS — E78.00 HYPERCHOLESTEROLEMIA: ICD-10-CM

## 2019-06-28 DIAGNOSIS — I10 ESSENTIAL HYPERTENSION: ICD-10-CM

## 2019-06-28 DIAGNOSIS — E11.21 TYPE 2 DIABETES WITH NEPHROPATHY (HCC): ICD-10-CM

## 2019-06-28 LAB
ALT SERPL-CCNC: 27 U/L (ref 16–61)
ANION GAP SERPL CALC-SCNC: 6 MMOL/L (ref 3–18)
AST SERPL-CCNC: 16 U/L (ref 15–37)
BUN SERPL-MCNC: 17 MG/DL (ref 7–18)
BUN/CREAT SERPL: 17 (ref 12–20)
CALCIUM SERPL-MCNC: 9.3 MG/DL (ref 8.5–10.1)
CHLORIDE SERPL-SCNC: 104 MMOL/L (ref 100–108)
CHOLEST SERPL-MCNC: 146 MG/DL
CO2 SERPL-SCNC: 29 MMOL/L (ref 21–32)
CREAT SERPL-MCNC: 0.98 MG/DL (ref 0.6–1.3)
EST. AVERAGE GLUCOSE BLD GHB EST-MCNC: 163 MG/DL
GLUCOSE SERPL-MCNC: 140 MG/DL (ref 74–99)
HBA1C MFR BLD: 7.3 % (ref 4.2–5.6)
HDLC SERPL-MCNC: 53 MG/DL (ref 40–60)
HDLC SERPL: 2.8 {RATIO} (ref 0–5)
LDLC SERPL CALC-MCNC: 66.4 MG/DL (ref 0–100)
LIPID PROFILE,FLP: NORMAL
POTASSIUM SERPL-SCNC: 4.4 MMOL/L (ref 3.5–5.5)
SODIUM SERPL-SCNC: 139 MMOL/L (ref 136–145)
TRIGL SERPL-MCNC: 133 MG/DL (ref ?–150)
VLDLC SERPL CALC-MCNC: 26.6 MG/DL

## 2019-06-28 PROCEDURE — 83036 HEMOGLOBIN GLYCOSYLATED A1C: CPT

## 2019-06-28 PROCEDURE — 84460 ALANINE AMINO (ALT) (SGPT): CPT

## 2019-06-28 PROCEDURE — 80061 LIPID PANEL: CPT

## 2019-06-28 PROCEDURE — 84450 TRANSFERASE (AST) (SGOT): CPT

## 2019-06-28 PROCEDURE — 80048 BASIC METABOLIC PNL TOTAL CA: CPT

## 2019-06-28 PROCEDURE — 36415 COLL VENOUS BLD VENIPUNCTURE: CPT

## 2019-07-03 ENCOUNTER — OFFICE VISIT (OUTPATIENT)
Dept: FAMILY MEDICINE CLINIC | Age: 64
End: 2019-07-03

## 2019-07-03 VITALS
BODY MASS INDEX: 28.68 KG/M2 | OXYGEN SATURATION: 100 % | DIASTOLIC BLOOD PRESSURE: 62 MMHG | TEMPERATURE: 98.1 F | HEART RATE: 77 BPM | RESPIRATION RATE: 18 BRPM | SYSTOLIC BLOOD PRESSURE: 116 MMHG | HEIGHT: 73 IN | WEIGHT: 216.4 LBS

## 2019-07-03 DIAGNOSIS — Z00.00 WELL ADULT EXAM: Primary | ICD-10-CM

## 2019-07-03 DIAGNOSIS — Z12.5 SCREENING FOR PROSTATE CANCER: ICD-10-CM

## 2019-07-03 DIAGNOSIS — N28.89 LEFT RENAL MASS: ICD-10-CM

## 2019-07-03 DIAGNOSIS — M51.36 DDD (DEGENERATIVE DISC DISEASE), LUMBAR: ICD-10-CM

## 2019-07-03 DIAGNOSIS — E11.65 TYPE 2 DIABETES MELLITUS WITH HYPERGLYCEMIA, WITHOUT LONG-TERM CURRENT USE OF INSULIN (HCC): ICD-10-CM

## 2019-07-03 RX ORDER — COLCHICINE 0.6 MG/1
TABLET ORAL
Qty: 30 TAB | Refills: 0 | Status: SHIPPED | OUTPATIENT
Start: 2019-07-03 | End: 2020-02-20 | Stop reason: SDUPTHER

## 2019-07-03 RX ORDER — DIAZEPAM 5 MG/1
TABLET ORAL
Qty: 60 TAB | Refills: 1 | Status: SHIPPED | OUTPATIENT
Start: 2019-07-03 | End: 2020-06-16 | Stop reason: SDUPTHER

## 2019-07-03 NOTE — PROGRESS NOTES
Subjective:     Ellie John is a 60 y.o. male presenting for annual exam and complete physical.    Had a URI over the last 4 days. He is feeling better today. Will hold off on pneumonia shot for today. Needs a refill on valium on colchicine  Has had recent dietary indiscretions ( fruit, watermelon) A1c was up. Needs a new referral to urology for left renal mass. Lab Results   Component Value Date/Time    Hemoglobin A1c 7.3 (H) 06/28/2019 08:10 AM    Hemoglobin A1c (POC) 6.4 04/21/2015 08:53 AM         Patient Active Problem List    Diagnosis Date Noted    Type 2 diabetes with nephropathy ([facility] 03/13/2018    Hypercholesterolemia     Rectal polyp 04/08/2016    Colon cancer screening 04/05/2016    History of colon polyps 04/05/2016    Renal cell carcinoma ([facility] [facility] 06/25/2015    Left renal mass 06/19/2015    Renal cell carcinoma of left kidney ([facility] [address] 06/19/2015    Family history of prostate cancer in father 04/22/2015    Diverticulosis 12/09/2009    BPH (benign prostatic hyperplasia) 12/09/2009    Right shoulder pain 12/09/2009    DM (diabetes mellitus) ([facility] [facility] 12/09/2009    DDD (degenerative disc disease), lumbar 12/09/2009    Pseudogout 12/09/2009     Current Outpatient Medications   Medication Sig Dispense Refill    diazePAM (VALIUM) 5 mg tablet TAKE ONE TABLET BY MOUTH EVERY 12 HOURS AS NEEDED 60 Tab 1    colchicine 0.6 mg tablet Take 2 tablets X 1 at onset of pain. May repeat 1 tab in one hour prn pain X1 30 Tab 0    SITagliptin (JANUVIA) 100 mg tablet TAKE 1 TABLET DAILY 90 Tab 3    lisinopril (PRINIVIL, ZESTRIL) 5 mg tablet Take 1 Tab by mouth daily.  90 Tab 1    metFORMIN (GLUCOPHAGE) 500 mg tablet TAKE 2 TABLETS IN THE MORNING AND 2 TABLETS IN THE EVENING 360 Tab 2    lancets (BD ULTRA-FINE II LANCETS) 30 gauge misc Blood sugar check daily 100 Lancet 3    simvastatin (ZOCOR) 20 mg tablet TAKE 1 TABLET NIGHTLY 90 Tab 3    glucose blood VI test strips (ASCENSIA AUTODISC VI, ONE TOUCH ULTRA TEST VI) strip Blood sugar check daily 100 Strip 6    aspirin delayed-release 81 mg tablet Take 81 mg by mouth daily.        No Known Allergies  Past Medical History:   Diagnosis Date    BPH (benign prostatic hyperplasia) 2009    DDD (degenerative disc disease), lumbar 2009    Diverticulosis 2009    DM (diabetes mellitus) ([facility] [address] 2009    History of colon polyps 2016    Hypercholesterolemia     Pseudogout 2009    Renal cell carcinoma of left kidney ([facility] [address] 6/19/15    Pathological Stage [id number] pap RCC FG2 s/p left open partial nephrectomy in 6/15     Right shoulder pain 2009     Past Surgical History:   Procedure Laterality Date    ENDOSCOPY, COLON, DIAGNOSTIC      sigmoidectomy    HX NEPHRECTOMY Left 6/19/15    Partial, [facility] Autumn Pickering[facility]    HX ORTHOPAEDIC  2005    left rotator cuff    HX OTHER SURGICAL  2007    hydrocele    HX TONSILLECTOMY       Family History   Problem Relation Age of Onset    Diabetes Mother     Dementia Maternal Grandmother     Stone Brother      Social History     Tobacco Use    Smoking status: Former Smoker     Packs/day: 1.00     Years: 40.00     Pack years: 40.00     Types: Cigarettes     Last attempt to quit: 2010     Years since quittin.3    Smokeless tobacco: Never Used   Substance Use Topics    Alcohol use: Yes     Comment: occ        Lab Results   Component Value Date/Time    Hemoglobin A1c 7.3 (H) 2019 08:10 AM    Hemoglobin A1c 6.9 (H) 2019 08:38 AM    Hemoglobin A1c 7.1 (H) 2018 08:32 AM    Glucose 140 (H) 2019 08:10 AM    Glucose (POC) 141 (H) 2016 09:57 AM    Microalbumin/Creat ratio (mg/g creat) 59 (H) 2018 07:59 AM    Microalbumin,urine random 8.40 (H) 2018 07:59 AM    LDL, calculated 66.4 2019 08:10 AM    Creatinine, POC 0.9 2017 11:11 AM    Creatinine 0.98 2019 08:10 AM      Lab Results   Component Value Date/Time    Cholesterol, total 146 06/28/2019 08:10 AM    Cholesterol (POC) 136 06/18/2013 08:30 AM    HDL Cholesterol 53 06/28/2019 08:10 AM    LDL, calculated 66.4 06/28/2019 08:10 AM    LDL Cholesterol (POC) 58 06/18/2013 08:30 AM    Triglyceride 133 06/28/2019 08:10 AM    Triglycerides (POC) 219 06/18/2013 08:30 AM    CHOL/HDL Ratio 2.8 06/28/2019 08:10 AM        Review of Systems  A comprehensive review of systems was negative except for that written in the HPI. Objective:     Visit Vitals  /62 (BP 1 Location: Left arm, BP Patient Position: Sitting)   Pulse 77   Temp 98.1 °F (36.7 °C) (Oral)   Resp 18   Ht 6' 1\" (1.854 m)   Wt 216 lb 6.4 oz (98.2 kg)   SpO2 100%   BMI 28.55 kg/m²     Physical exam:   General appearance - alert, well appearing, and in no distress  Ears - bilateral TM's and external ear canals normal  Nose - normal and patent, no erythema, discharge or polyps      As aMouth - mucous membranes moist, pharynx normal without lesions  Neck - supple, no significant adenopathy  Lymphatics - no palpable lymphadenopathy, no hepatosplenomegaly  Chest - clear to auscultation, no wheezes, rales or rhonchi, symmetric air entry  Heart - normal rate, regular rhythm, normal S1, S2, no murmurs, rubs, clicks or gallops  Abdomen - soft, nontender, nondistended, no masses or organomegaly  Musculoskeletal - no joint tenderness, deformity or swelling  Extremities - no pedal edema noted  Skin - normal coloration and turgor, no rashes, no suspicious skin lesions noted     Assessment/Plan:         ICD-10-CM ICD-9-CM    1. Well adult exam Z00.00 V70.0    2. DDD (degenerative disc disease), lumbar M51.36 722.52 diazePAM (VALIUM) 5 mg tablet   3. Left renal mass N28.89 593.9 REFERRAL TO UROLOGY   4.  Type 2 diabetes mellitus with hyperglycemia, without long-term current use of insulin (HCC) E11.65 250.00 LIPID PANEL     790.29 AST      ALT      HEMOGLOBIN A1C WITH EAG      MICROALBUMIN, UR, RAND W/ MICROALB/CREAT RATIO   5. Screening for prostate cancer Z12.5 V76.44 PSA W/ REFLX FREE PSA   . As above,  treatment plan as listed below  Orders Placed This Encounter    LIPID PANEL    AST    ALT    PSA W/ REFLX FREE PSA    HEMOGLOBIN A1C WITH EAG    MICROALBUMIN, UR, RAND W/ MICROALB/CREAT RATIO    REFERRAL TO UROLOGY    diazePAM (VALIUM) 5 mg tablet    colchicine 0.6 mg tablet     Follow-up and Dispositions    · Return in about 4 months (around 11/3/2019). An After Visit Summary was printed and given to the patient. This has been fully explained to the patient, who indicates understanding. Labs written for next visit; including PSA ( Pt agrees to) and a urine microalbumin.

## 2019-07-03 NOTE — PATIENT INSTRUCTIONS

## 2019-07-03 NOTE — PROGRESS NOTES
Patient is here for well male visit. 1. Have you been to the ER, urgent care clinic since your last visit? Hospitalized since your last visit? No    2. Have you seen or consulted any other health care providers outside of the [address] [place] [address] since your last visit? Include any pap smears or colon screening.  No

## 2019-08-02 ENCOUNTER — OFFICE VISIT (OUTPATIENT)
Dept: FAMILY MEDICINE CLINIC | Age: 64
End: 2019-08-02

## 2019-08-02 VITALS
OXYGEN SATURATION: 99 % | RESPIRATION RATE: 17 BRPM | TEMPERATURE: 98.5 F | HEART RATE: 75 BPM | WEIGHT: 217 LBS | DIASTOLIC BLOOD PRESSURE: 72 MMHG | SYSTOLIC BLOOD PRESSURE: 126 MMHG | BODY MASS INDEX: 28.76 KG/M2 | HEIGHT: 73 IN

## 2019-08-02 DIAGNOSIS — M54.6 ACUTE BILATERAL THORACIC BACK PAIN: Primary | ICD-10-CM

## 2019-08-02 RX ORDER — PREDNISONE 10 MG/1
TABLET ORAL
Qty: 21 TAB | Refills: 0 | Status: SHIPPED | OUTPATIENT
Start: 2019-08-02 | End: 2019-11-21 | Stop reason: ALTCHOICE

## 2019-08-02 NOTE — PROGRESS NOTES
HISTORY OF PRESENT ILLNESS    Jamshid Johnson is a 60y.o. year old male here today for:  Thoracic back pain    Onset four days   Context  Working on completing his garage and also was pulling up tomato posts and he thinks this contributed. However, he was pulling a washer into his home on a june and felt a pull in his thoracic region bilaterally   Site thoracic, mid portion  Duration  About three days    Type of pain or sensation aching   Associated symptoms radiates toward lateral aspects of chest wall  Severity severe   Exacerbating factors movement seems to worsen   Relieving factors has tried ibuprofen and metaxalone and diazepam without relief   Review of Systems   Respiratory: Negative. Cardiovascular: Negative for chest pain. Musculoskeletal: Negative for joint pain and neck pain. Current Outpatient Medications   Medication Sig Dispense Refill    diazePAM (VALIUM) 5 mg tablet TAKE ONE TABLET BY MOUTH EVERY 12 HOURS AS NEEDED 60 Tab 1    colchicine 0.6 mg tablet Take 2 tablets X 1 at onset of pain. May repeat 1 tab in one hour prn pain X1 30 Tab 0    SITagliptin (JANUVIA) 100 mg tablet TAKE 1 TABLET DAILY 90 Tab 3    lisinopril (PRINIVIL, ZESTRIL) 5 mg tablet Take 1 Tab by mouth daily. 90 Tab 1    metFORMIN (GLUCOPHAGE) 500 mg tablet TAKE 2 TABLETS IN THE MORNING AND 2 TABLETS IN THE EVENING 360 Tab 2    lancets (BD ULTRA-FINE II LANCETS) 30 gauge misc Blood sugar check daily 100 Lancet 3    simvastatin (ZOCOR) 20 mg tablet TAKE 1 TABLET NIGHTLY 90 Tab 3    glucose blood VI test strips (ASCENSIA AUTODISC VI, ONE TOUCH ULTRA TEST VI) strip Blood sugar check daily 100 Strip 6    aspirin delayed-release 81 mg tablet Take 81 mg by mouth daily.        Patient Active Problem List   Diagnosis Code    Diverticulosis K57.90    BPH (benign prostatic hyperplasia) N40.0    Right shoulder pain M25.511    DM (diabetes mellitus) ([facility] E11.9    DDD (degenerative disc disease), lumbar M51.36    Pseudogout M11.20    Family history of prostate cancer in father Z80.41    Colon cancer screening Z12.11    History of colon polyps Z86.010    Rectal polyp K62.1    Left renal mass N28.89    Renal cell carcinoma (HCC) C64.9    Hypercholesterolemia E78.00    Renal cell carcinoma of left kidney (HCC) C64.2    Type 2 diabetes with nephropathy (HCC) E11.21     Lab Results   Component Value Date/Time    Hemoglobin A1c 7.3 (H) 06/28/2019 08:10 AM    Hemoglobin A1c (POC) 6.4 04/21/2015 08:53 AM       Reviewed past medical, family and social history    OBJECTIVE:  Awake and alert in no acute distress  Neck supple without lymphadenopathy, no carotid artery bruits auscultated bilaterally. No thyromegaly  Lungs clear throughout  S1 S2 RRR without ectopy or murmur auscultated. Extremities: no clubbing, cyanosis, peripheral edema  Inspection: no erythema no edema no warmth to palpation  Cervical paraspinals no tenderness to palpation bilaterally  Thoracic paraspinals TTP to mid portion of thoracic spine--with tenderness to palpation lateral aspects bilaterally mid-axillary region  Mild TTP over anterior chest wall bilaterally   Lumbar paraspinals no TTP   No palpable spasms     Visit Vitals  /72 (BP 1 Location: Right arm, BP Patient Position: Sitting)   Pulse 75   Temp 98.5 °F (36.9 °C) (Oral)   Resp 17   Ht 6' 1\" (1.854 m)   Wt 217 lb (98.4 kg)   SpO2 99%   BMI 28.63 kg/m²     Diagnoses and all orders for this visit:    Acute bilateral thoracic back pain  -     predniSONE (STERAPRED DS) 10 mg dose pack; See administration instruction per 10mg dose pack, Normal, Disp-21 Tab, R-0    avoid concurrent use of NSAIDs or aspirin while on prednisone taper  General comfort measures  Continue all other prescribed medications   Patient agrees with plan and verbalizes understanding. I have discussed the diagnosis with the patient and the intended plan as seen in the above orders.   The patient has received an after-visit summary and questions were answered concerning future plans. I have discussed medication side effects and warnings with the patient as well. Follow-up and Dispositions    · Return if symptoms worsen or fail to improve.

## 2019-08-02 NOTE — PROGRESS NOTES
Chief Complaint   Patient presents with    Back Pain     for the past four days     Rib Pain       1. Have you been to the ER, urgent care clinic since your last visit? Hospitalized since your last visit? No     2. Have you seen or consulted any other health care providers outside of the [address] [place] [address] since your last visit? Include any pap smears or colon screening.  No

## 2019-08-30 ENCOUNTER — TELEPHONE (OUTPATIENT)
Dept: FAMILY MEDICINE CLINIC | Age: 64
End: 2019-08-30

## 2019-08-30 NOTE — TELEPHONE ENCOUNTER
On 8/2/19 was seen by Sameera Shah DNP, FNP-BC for an acute visit regarding bilateral thoracic back pain. I don't see anywhere that he mentioned a shoulder knot in the notes. Patient is stating that his \"knot\" went away but now his shoulder down to his elbow is swollen and hot to touch.

## 2019-08-30 NOTE — TELEPHONE ENCOUNTER
Patient was seen a few weeks ago for shoulder knot . It cleared up after a couple of days and no It is swollen and hot to touch along with his elbow. Please advise [phone] No nurse available for triage.

## 2019-08-30 NOTE — TELEPHONE ENCOUNTER
Spoke with patient he stated that his issue is the following: The muscle pain in his back went away after he took the prednisone he was prescribed. He states the pain is back now and radiating to his shoulder. .. He is requesting another prescription of prednisone because it worked well last time and he did not want to go all weekend not being able to move his back. He was willing to make an appointment however the first available is Tuesday due to the holiday. The shoulder swelling and hot to touch Is a separate issue. It has happened before according to patient. He accepted the appointment for 9/3 (first available) however would like to know if he could have more prednisone to avoid being in pain all weekend.

## 2019-09-15 RX ORDER — METFORMIN HYDROCHLORIDE 500 MG/1
TABLET ORAL
Qty: 360 TAB | Refills: 4 | Status: SHIPPED | OUTPATIENT
Start: 2019-09-15 | End: 2020-12-06

## 2019-10-01 RX ORDER — LISINOPRIL 5 MG/1
TABLET ORAL
Qty: 90 TAB | Refills: 4 | Status: SHIPPED | OUTPATIENT
Start: 2019-10-01 | End: 2020-12-24

## 2019-10-14 RX ORDER — SIMVASTATIN 20 MG/1
TABLET, FILM COATED ORAL
Qty: 90 TAB | Refills: 4 | Status: SHIPPED | OUTPATIENT
Start: 2019-10-14 | End: 2021-01-15

## 2019-11-07 ENCOUNTER — HOSPITAL ENCOUNTER (OUTPATIENT)
Dept: LAB | Age: 64
Discharge: HOME | End: 2019-11-07
Payer: OTHER GOVERNMENT

## 2019-11-07 DIAGNOSIS — Z12.5 SCREENING FOR PROSTATE CANCER: ICD-10-CM

## 2019-11-07 DIAGNOSIS — E11.65 TYPE 2 DIABETES MELLITUS WITH HYPERGLYCEMIA, WITHOUT LONG-TERM CURRENT USE OF INSULIN (HCC): ICD-10-CM

## 2019-11-07 LAB
ALT SERPL-CCNC: 26 U/L (ref 16–61)
AST SERPL-CCNC: 12 U/L (ref 10–38)
EST. AVERAGE GLUCOSE BLD GHB EST-MCNC: 192 MG/DL
HBA1C MFR BLD: 8.3 % (ref 4.2–5.6)

## 2019-11-07 PROCEDURE — 82043 UR ALBUMIN QUANTITATIVE: CPT

## 2019-11-07 PROCEDURE — 80061 LIPID PANEL: CPT

## 2019-11-07 PROCEDURE — 36415 COLL VENOUS BLD VENIPUNCTURE: CPT

## 2019-11-07 PROCEDURE — 83036 HEMOGLOBIN GLYCOSYLATED A1C: CPT

## 2019-11-07 PROCEDURE — 84460 ALANINE AMINO (ALT) (SGPT): CPT

## 2019-11-07 PROCEDURE — 84450 TRANSFERASE (AST) (SGOT): CPT

## 2019-11-07 PROCEDURE — 84153 ASSAY OF PSA TOTAL: CPT

## 2019-11-08 LAB
CHOLEST SERPL-MCNC: 160 MG/DL
CREAT UR-MCNC: 108 MG/DL (ref 30–125)
HDLC SERPL-MCNC: 46 MG/DL (ref 40–60)
HDLC SERPL: 3.5 {RATIO} (ref 0–5)
LDLC SERPL CALC-MCNC: 89 MG/DL (ref 0–100)
LIPID PROFILE,FLP: NORMAL
MICROALBUMIN UR-MCNC: 17.2 MG/DL (ref 0–3)
MICROALBUMIN/CREAT UR-RTO: 159 MG/G (ref 0–30)
PSA SERPL-MCNC: 1.1 NG/ML (ref 0–4)
REFLEX CRITERIA: NORMAL
TRIGL SERPL-MCNC: 125 MG/DL (ref ?–150)
VLDLC SERPL CALC-MCNC: 25 MG/DL

## 2019-11-20 NOTE — TELEPHONE ENCOUNTER
Spoke with Neurology.  Patient's Neurologist, Dr. Tomlinson, would like patient transferred to the stroke service on [address].  Discussed case with Attending Dr. Ruiz.      Sagar Garcia PA-C  Department of Medicine Verbal order for medication refill per Danay Tong MD.

## 2019-11-21 ENCOUNTER — TELEPHONE (OUTPATIENT)
Dept: FAMILY MEDICINE CLINIC | Age: 64
End: 2019-11-21

## 2019-11-21 ENCOUNTER — OFFICE VISIT (OUTPATIENT)
Dept: FAMILY MEDICINE CLINIC | Age: 64
End: 2019-11-21

## 2019-11-21 VITALS
SYSTOLIC BLOOD PRESSURE: 119 MMHG | RESPIRATION RATE: 16 BRPM | OXYGEN SATURATION: 98 % | TEMPERATURE: 98.8 F | DIASTOLIC BLOOD PRESSURE: 65 MMHG | HEART RATE: 85 BPM | HEIGHT: 73 IN | WEIGHT: 214 LBS | BODY MASS INDEX: 28.36 KG/M2

## 2019-11-21 DIAGNOSIS — E78.00 HYPERCHOLESTEREMIA: ICD-10-CM

## 2019-11-21 DIAGNOSIS — M25.511 ACUTE PAIN OF RIGHT SHOULDER: ICD-10-CM

## 2019-11-21 DIAGNOSIS — E11.65 TYPE 2 DIABETES MELLITUS WITH HYPERGLYCEMIA, WITHOUT LONG-TERM CURRENT USE OF INSULIN (HCC): Primary | ICD-10-CM

## 2019-11-21 NOTE — PROGRESS NOTES
HISTORY OF PRESENT ILLNESS  Robert Spring is a 60 y.o. male. HPI  Patient is here today for evaluation and treatment of: Right Shoulder Injury/Diabetes/Hypertension/Cholesterol problem    Shoulder Injury: pt fell today ; He was going down the stairs at one of his homes. He caught his right arm on the rail of the house    Diabetes:  A1c has increased. Has had some dietary indiscretions. He is compliant with his meds. May have some challenges with sticking with his diabetic diet    Hypertension:  BP is stable. Cholesterol: This is relatively stable; pt is on zocor;  Pt tolerates med well. Attempts a lower cholesterol diet. Lab Results   Component Value Date/Time    Cholesterol, total 160 11/07/2019 08:23 AM    Cholesterol (POC) 136 06/18/2013 08:30 AM    HDL Cholesterol 46 11/07/2019 08:23 AM    HDL Cholesterol (POC) 35 06/18/2013 08:30 AM    LDL Cholesterol (POC) 58 06/18/2013 08:30 AM    LDL, calculated 89 11/07/2019 08:23 AM    VLDL, calculated 25 11/07/2019 08:23 AM    Triglyceride 125 11/07/2019 08:23 AM    Triglycerides (POC) 219 06/18/2013 08:30 AM    CHOL/HDL Ratio 3.5 11/07/2019 08:23 AM           Current Outpatient Medications:     simvastatin (ZOCOR) 20 mg tablet, TAKE 1 TABLET NIGHTLY, Disp: 90 Tab, Rfl: 4    glucose blood VI test strips (FREESTYLE LITE STRIPS) strip, Blood sugar check daily. , Disp: 100 Strip, Rfl: 3    lisinopril (PRINIVIL, ZESTRIL) 5 mg tablet, TAKE 1 TABLET DAILY, Disp: 90 Tab, Rfl: 4    metFORMIN (GLUCOPHAGE) 500 mg tablet, TAKE 2 TABLETS IN THE MORNING AND 2 TABLETS IN THE EVENING, Disp: 360 Tab, Rfl: 4    diazePAM (VALIUM) 5 mg tablet, TAKE ONE TABLET BY MOUTH EVERY 12 HOURS AS NEEDED, Disp: 60 Tab, Rfl: 1    colchicine 0.6 mg tablet, Take 2 tablets X 1 at onset of pain.   May repeat 1 tab in one hour prn pain X1, Disp: 30 Tab, Rfl: 0    SITagliptin (JANUVIA) 100 mg tablet, TAKE 1 TABLET DAILY, Disp: 90 Tab, Rfl: 3    lancets (BD ULTRA-FINE II LANCETS) 30 gauge misc, Blood sugar check daily, Disp: 100 Lancet, Rfl: 3    aspirin delayed-release 81 mg tablet, Take 81 mg by mouth daily. , Disp: , Rfl:     Review of Systems   Constitutional: Negative for chills and fever. Respiratory: Negative for shortness of breath and wheezing. Cardiovascular: Negative for chest pain and palpitations. Physical Exam  Constitutional:       General: He is not in acute distress. Appearance: Normal appearance. Cardiovascular:      Rate and Rhythm: Normal rate and regular rhythm. Heart sounds: No murmur. No friction rub. No gallop. Pulmonary:      Effort: Pulmonary effort is normal. No respiratory distress. Breath sounds: Normal breath sounds. No wheezing or rales. Musculoskeletal:         General: Tenderness (at right posterior axillary area;  ROM intact) present. Neurological:      Mental Status: He is alert. Sensory exam of the foot is normal, tested with the monofilament. Good pulses, no lesions or ulcers, good peripheral pulses. ASSESSMENT and PLAN    ICD-10-CM ICD-9-CM    1. Type 2 diabetes mellitus with hyperglycemia, without long-term current use of insulin ([facility]) E11.65 250.00  DIABETES FOOT EXAM     790.29    2. Hypercholesteremia E78.00 272.0    3. Acute pain of right shoulder M25.511 719.41         As above, not controlled   treatment plan as listed below  Orders Placed This Encounter     DIABETES FOOT EXAM    dapagliflozin (FARXIGA) 5 mg tab tablet     Add [place] as ordered; side effect reviewed  Follow-up and Dispositions    · Return in about 3 months (around 2/21/2020) for diabetes. An After Visit Summary was printed and given to the patient. This has been fully explained to the patient, who indicates understanding.

## 2019-11-21 NOTE — PATIENT INSTRUCTIONS

## 2019-11-21 NOTE — PROGRESS NOTES
1. Have you been to the ER, urgent care clinic since your last visit? Hospitalized since your last visit? No    2. Have you seen or consulted any other health care providers outside of the [address] [place] [address] since your last visit? Include any pap smears or colon screening.  No

## 2019-11-22 ENCOUNTER — DOCUMENTATION ONLY (OUTPATIENT)
Dept: FAMILY MEDICINE CLINIC | Age: 64
End: 2019-11-22

## 2019-11-22 NOTE — TELEPHONE ENCOUNTER
PA was submitted for [place] and denied by [email or web address] as patient has not tried preferred medications which include Jardiance, Synjardy, Synjardy XR or Glyxambi.

## 2019-12-16 ENCOUNTER — PATIENT MESSAGE (OUTPATIENT)
Dept: FAMILY MEDICINE CLINIC | Age: 64
End: 2019-12-16

## 2020-01-20 NOTE — TELEPHONE ENCOUNTER
Previous script was sent to [facility]. Alanna Katerina is now requesting a 90 day supply    Last Visit: 11/21/19 with MD Linh Lee  Next Appointment: 2/20/20 with MD Linh Lee    Requested Prescriptions     Pending Prescriptions Disp Refills    empagliflozin (JARDIANCE) 10 mg tablet 90 Tab 1     Sig: Take 1 Tab by mouth daily.

## 2020-01-29 RX ORDER — BLOOD-GLUCOSE CONTROL, NORMAL
EACH MISCELLANEOUS
Qty: 100 LANCET | Refills: 3 | Status: SHIPPED | OUTPATIENT
Start: 2020-01-29 | End: 2021-07-02

## 2020-01-29 NOTE — TELEPHONE ENCOUNTER
Last Visit: 11/21/19 with MD Scottie Cole  Next Appointment: 2/20/20 with MD Scottie Cole  Previous Refill Encounter(s): 11/29/18 #100 with 3 refills    Requested Prescriptions     Pending Prescriptions Disp Refills    lancets (BD ULTRA-FINE II LANCETS) 30 gauge misc 100 Lancet 3     Sig: Blood sugar check daily

## 2020-01-29 NOTE — TELEPHONE ENCOUNTER
Requested Prescriptions     Pending Prescriptions Disp Refills    lancets (BD ULTRA-FINE II LANCETS) 30 gauge misc 100 Lancet 3     Sig: Blood sugar check daily     Patient is out of lancets. Patient uses mail order pharmacy.

## 2020-02-15 ENCOUNTER — PATIENT MESSAGE (OUTPATIENT)
Dept: FAMILY MEDICINE CLINIC | Age: 65
End: 2020-02-15

## 2020-02-15 DIAGNOSIS — E78.00 HYPERCHOLESTEROLEMIA: ICD-10-CM

## 2020-02-15 DIAGNOSIS — I10 ESSENTIAL HYPERTENSION: ICD-10-CM

## 2020-02-15 DIAGNOSIS — E11.65 TYPE 2 DIABETES MELLITUS WITH HYPERGLYCEMIA, WITHOUT LONG-TERM CURRENT USE OF INSULIN (HCC): Primary | ICD-10-CM

## 2020-02-18 NOTE — TELEPHONE ENCOUNTER
From: Bruce Chaparro  To: Parker Beauchamp MD  Sent: 2/15/2020 1:08 PM EST  Subject: Non-Urgent Medical Question    Please ensure that blood work request are in for A1C for friday.

## 2020-02-20 ENCOUNTER — OFFICE VISIT (OUTPATIENT)
Dept: FAMILY MEDICINE CLINIC | Age: 65
End: 2020-02-20

## 2020-02-20 ENCOUNTER — HOSPITAL ENCOUNTER (OUTPATIENT)
Dept: LAB | Age: 65
Discharge: HOME | End: 2020-02-20
Payer: OTHER GOVERNMENT

## 2020-02-20 VITALS
OXYGEN SATURATION: 100 % | WEIGHT: 211 LBS | TEMPERATURE: 98 F | DIASTOLIC BLOOD PRESSURE: 67 MMHG | RESPIRATION RATE: 16 BRPM | SYSTOLIC BLOOD PRESSURE: 126 MMHG | HEIGHT: 73 IN | HEART RATE: 65 BPM | BODY MASS INDEX: 27.96 KG/M2

## 2020-02-20 DIAGNOSIS — E11.65 TYPE 2 DIABETES MELLITUS WITH HYPERGLYCEMIA, WITHOUT LONG-TERM CURRENT USE OF INSULIN (HCC): ICD-10-CM

## 2020-02-20 DIAGNOSIS — E78.00 HYPERCHOLESTEROLEMIA: ICD-10-CM

## 2020-02-20 DIAGNOSIS — M79.672 LEFT FOOT PAIN: ICD-10-CM

## 2020-02-20 DIAGNOSIS — E11.65 TYPE 2 DIABETES MELLITUS WITH HYPERGLYCEMIA, WITHOUT LONG-TERM CURRENT USE OF INSULIN (HCC): Primary | ICD-10-CM

## 2020-02-20 DIAGNOSIS — I10 ESSENTIAL HYPERTENSION: ICD-10-CM

## 2020-02-20 LAB
ALT SERPL-CCNC: 22 U/L (ref 16–61)
ANION GAP SERPL CALC-SCNC: 5 MMOL/L (ref 3–18)
AST SERPL-CCNC: 12 U/L (ref 10–38)
BUN SERPL-MCNC: 21 MG/DL (ref 7–18)
BUN/CREAT SERPL: 20 (ref 12–20)
CALCIUM SERPL-MCNC: 9.4 MG/DL (ref 8.5–10.1)
CHLORIDE SERPL-SCNC: 104 MMOL/L (ref 100–111)
CHOLEST SERPL-MCNC: 158 MG/DL
CO2 SERPL-SCNC: 29 MMOL/L (ref 21–32)
CREAT SERPL-MCNC: 1.06 MG/DL (ref 0.6–1.3)
EST. AVERAGE GLUCOSE BLD GHB EST-MCNC: 189 MG/DL
GLUCOSE SERPL-MCNC: 142 MG/DL (ref 74–99)
HBA1C MFR BLD: 8.2 % (ref 4.2–5.6)
HDLC SERPL-MCNC: 44 MG/DL (ref 40–60)
HDLC SERPL: 3.6 {RATIO} (ref 0–5)
LDLC SERPL CALC-MCNC: 68.6 MG/DL (ref 0–100)
LIPID PROFILE,FLP: ABNORMAL
POTASSIUM SERPL-SCNC: 4.5 MMOL/L (ref 3.5–5.5)
SODIUM SERPL-SCNC: 138 MMOL/L (ref 136–145)
TRIGL SERPL-MCNC: 227 MG/DL (ref ?–150)
URATE SERPL-MCNC: 5.4 MG/DL (ref 2.6–7.2)
VLDLC SERPL CALC-MCNC: 45.4 MG/DL

## 2020-02-20 PROCEDURE — 84550 ASSAY OF BLOOD/URIC ACID: CPT

## 2020-02-20 PROCEDURE — 84460 ALANINE AMINO (ALT) (SGPT): CPT

## 2020-02-20 PROCEDURE — 84450 TRANSFERASE (AST) (SGOT): CPT

## 2020-02-20 PROCEDURE — 80061 LIPID PANEL: CPT

## 2020-02-20 PROCEDURE — 36415 COLL VENOUS BLD VENIPUNCTURE: CPT

## 2020-02-20 PROCEDURE — 83036 HEMOGLOBIN GLYCOSYLATED A1C: CPT

## 2020-02-20 PROCEDURE — 80048 BASIC METABOLIC PNL TOTAL CA: CPT

## 2020-02-20 RX ORDER — COLCHICINE 0.6 MG/1
TABLET ORAL
Qty: 30 TAB | Refills: 0 | Status: SHIPPED | OUTPATIENT
Start: 2020-02-20 | End: 2020-03-27 | Stop reason: SDUPTHER

## 2020-02-20 RX ORDER — PREDNISONE 10 MG/1
TABLET ORAL
COMMUNITY
Start: 2020-02-17 | End: 2020-06-16 | Stop reason: ALTCHOICE

## 2020-02-20 RX ORDER — DICLOFENAC SODIUM 10 MG/G
2 GEL TOPICAL
COMMUNITY
Start: 2020-02-17 | End: 2020-02-27

## 2020-02-20 NOTE — PROGRESS NOTES
1. Have you been to the ER, urgent care clinic since your last visit? Hospitalized since your last visit? Yes Where: [facility] emergency room    2. Have you seen or consulted any other health care providers outside of the [address] [place] [address] since your last visit? Include any pap smears or colon screening.  No

## 2020-02-20 NOTE — PATIENT INSTRUCTIONS
Charcot Foot: Care Instructions  Your Care Instructions    Charcot foot (pronounced \"candice-KO\") is a problem that can happen in people who have nerve damage from diabetes. In rare cases, it is caused by other health problems. Nerve damage in the foot or ankle leads to numbness, pain, redness, and swelling. The sore foot may feel hotter than the other foot. This increases the chance that you could break bones in your foot or have another injury and not feel it. If your foot gets injured a lot, the joints can break down, and the foot can become deformed. If you have severe Charcot foot, you may not be able to walk normally. The problem also increases the risk of foot ulcers and infections. You can protect your feet as much as possible to keep them from being injured. If you already have Charcot foot, you may wear a cast--or a series of casts--for several months to help your foot heal.  Follow-up care is a key part of your treatment and safety. Be sure to make and go to all appointments, and call your doctor if you are having problems. It's also a good idea to know your test results and keep a list of the medicines you take. How can you care for yourself at home? · Follow your doctor's directions if you are told to wear a plaster or fiberglass cast. The cast can help ease pressure and prevent further damage to your foot. · Keep your blood sugar in your target range by eating healthy foods, getting regular exercise, and taking insulin or other medicine as prescribed. Check your blood sugar as often as your doctor recommends. Taking care of your feet  · Inspect your feet daily for blisters, cuts, cracks, or sores. If you cannot see well, use a mirror or have someone help you. · Take care of your feet:  ? Wash your feet every day. Use warm (not hot) water. Check the water temperature with your wrists, not your feet. ? Dry your feet well. Pat them dry; do not rub the skin on your feet too hard.  Dry well between your toes. If the skin on your feet stays moist, bacteria or a fungus can grow, which can lead to infection. ? Keep your skin soft. Use skin cream to prevent calluses and cracks. However, do not put the cream between your toes, and stop using any cream that causes a rash. ? Clean underneath your toenails carefully. Do not use a sharp object to clean underneath your toenails. Use the blunt end of a nail file or other rounded tool. ? Trim and file your toenails straight across to prevent ingrown toenails. Use a nail clipper, not scissors. Use an emery board to smooth the edges. · Change socks daily. Socks should be thick and cushioned and fit loosely around your feet. Socks without seams are best because seams often rub the feet. Do not wear stockings, socks, or garters that come up to the thigh or knee unless your doctor tells you to. These can decrease blood flow. You can find socks from specialty catalogs for people with diabetes who have problem feet. · Look inside your shoes every day for things such as gravel or torn linings, which could cause blisters or sores. · Buy shoes that fit well. Shop for shoes in the evening when your feet are more likely to be swollen. Look for shoes that have plenty of space around the toes. This helps prevent bunions and blisters. Try on shoes with the kind of socks you will usually wear with those shoes. Break in new shoes slowly by wearing them for no more than an hour a day for several days. Avoid plastic shoes. They may rub your feet and cause blisters. Good shoes should be made of materials that are flexible and breathable, such as leather or cloth. · Do not go barefoot. Do not wear sandals, and do not wear shoes with very thin soles. Thin soles are easy to puncture. They also do not protect your feet from hot pavement or cold weather. · Have your doctor check your feet during each visit. If you have a foot problem, see your doctor.  Do not try to treat an early foot problem at home. Home remedies or treatments that you can buy without a prescription (such as corn removers) can be harmful. · Always get early treatment for foot problems. A minor irritation can lead to a major problem if not properly cared for early. · Do not smoke. Smoking affects blood flow and can make foot problems worse. If you need help quitting, talk to your doctor about stop-smoking programs and medicines. These can increase your chances of quitting for good. When should you call for help? Call your doctor now or seek immediate medical care if:    · You have symptoms of infection, such as:  ? Increased pain, swelling, warmth, or redness. ? Red streaks leading from the area. ? Pus draining from the area. ? A fever.    Watch closely for changes in your health, and be sure to contact your doctor if:    · You have a new problem with your feet, such as:  ? A new sore or ulcer. ? A break in the skin that is not healing after several days. ? Bleeding corns or calluses. ? An ingrown toenail.     · You do not get better as expected. Where can you learn more? Go to http://eddie-dion.info/. Enter C247 in the search box to learn more about \"Charcot Foot: Care Instructions. \"  Current as of: April 16, 2019  Content Version: 12.2  © 6400-8851 Healthwise, Incorporated. Care instructions adapted under license by [employer] (which disclaims liability or warranty for this information). If you have questions about a medical condition or this instruction, always ask your healthcare professional. [place] [address] any warranty or liability for your use of this information.

## 2020-03-26 ENCOUNTER — PATIENT MESSAGE (OUTPATIENT)
Dept: FAMILY MEDICINE CLINIC | Age: 65
End: 2020-03-26

## 2020-03-27 RX ORDER — COLCHICINE 0.6 MG/1
TABLET ORAL
Qty: 30 TAB | Refills: 3 | Status: SHIPPED | OUTPATIENT
Start: 2020-03-27 | End: 2022-01-10

## 2020-03-28 NOTE — TELEPHONE ENCOUNTER
From: Bekah Blair  To: Ladonna Gillespie MD  Sent: 3/26/2020 1:14 PM EDT  Subject: Prescription Question    Dr Indira Rosa, can you write an send to [facility] my cholchicine medicine. I took to [facility] and [facility] would not pay because it is on my medication list and they consider it a maintanance drug. so i would have to pay full retail 48$ for 30 day supply. it is 10 from [facility] for 90 day.  . thanks for this   any questions call me [phone]    [facility]

## 2020-06-09 RX ORDER — EMPAGLIFLOZIN 10 MG/1
TABLET, FILM COATED ORAL
Qty: 90 TAB | Refills: 3 | Status: SHIPPED | OUTPATIENT
Start: 2020-06-09 | End: 2021-07-02

## 2020-06-15 ENCOUNTER — HOSPITAL ENCOUNTER (OUTPATIENT)
Dept: LAB | Age: 65
Discharge: HOME | End: 2020-06-15
Payer: OTHER GOVERNMENT

## 2020-06-15 DIAGNOSIS — E11.65 TYPE 2 DIABETES MELLITUS WITH HYPERGLYCEMIA, WITHOUT LONG-TERM CURRENT USE OF INSULIN (HCC): ICD-10-CM

## 2020-06-15 DIAGNOSIS — E78.00 HYPERCHOLESTEROLEMIA: ICD-10-CM

## 2020-06-15 LAB
ALT SERPL-CCNC: 25 U/L (ref 16–61)
AST SERPL-CCNC: 15 U/L (ref 10–38)
CHOLEST SERPL-MCNC: 153 MG/DL
EST. AVERAGE GLUCOSE BLD GHB EST-MCNC: 160 MG/DL
HBA1C MFR BLD: 7.2 % (ref 4.2–5.6)
HDLC SERPL-MCNC: 40 MG/DL (ref 40–60)
HDLC SERPL: 3.8 {RATIO} (ref 0–5)
LDLC SERPL CALC-MCNC: 57 MG/DL (ref 0–100)
LIPID PROFILE,FLP: ABNORMAL
TRIGL SERPL-MCNC: 280 MG/DL (ref ?–150)
VLDLC SERPL CALC-MCNC: 56 MG/DL

## 2020-06-15 PROCEDURE — 84450 TRANSFERASE (AST) (SGOT): CPT

## 2020-06-15 PROCEDURE — 83036 HEMOGLOBIN GLYCOSYLATED A1C: CPT

## 2020-06-15 PROCEDURE — 84460 ALANINE AMINO (ALT) (SGPT): CPT

## 2020-06-15 PROCEDURE — 80061 LIPID PANEL: CPT

## 2020-06-15 PROCEDURE — 36415 COLL VENOUS BLD VENIPUNCTURE: CPT

## 2020-06-16 ENCOUNTER — VIRTUAL VISIT (OUTPATIENT)
Dept: FAMILY MEDICINE CLINIC | Age: 65
End: 2020-06-16

## 2020-06-16 ENCOUNTER — OFFICE VISIT (OUTPATIENT)
Dept: ORTHOPEDIC SURGERY | Age: 65
End: 2020-06-16

## 2020-06-16 VITALS
SYSTOLIC BLOOD PRESSURE: 144 MMHG | RESPIRATION RATE: 15 BRPM | TEMPERATURE: 96.8 F | DIASTOLIC BLOOD PRESSURE: 73 MMHG | HEIGHT: 73 IN | HEART RATE: 60 BPM | OXYGEN SATURATION: 99 % | BODY MASS INDEX: 28.31 KG/M2 | WEIGHT: 213.6 LBS

## 2020-06-16 DIAGNOSIS — M79.672 LEFT FOOT PAIN: ICD-10-CM

## 2020-06-16 DIAGNOSIS — G89.29 CHRONIC PAIN OF LEFT ANKLE: ICD-10-CM

## 2020-06-16 DIAGNOSIS — L30.4 INTERTRIGO: ICD-10-CM

## 2020-06-16 DIAGNOSIS — M25.572 CHRONIC PAIN OF LEFT ANKLE: ICD-10-CM

## 2020-06-16 DIAGNOSIS — M11.20 CHONDROCALCINOSIS ARTICULARIS: Primary | ICD-10-CM

## 2020-06-16 DIAGNOSIS — E11.65 TYPE 2 DIABETES MELLITUS WITH HYPERGLYCEMIA, WITHOUT LONG-TERM CURRENT USE OF INSULIN (HCC): Primary | ICD-10-CM

## 2020-06-16 DIAGNOSIS — M51.36 DDD (DEGENERATIVE DISC DISEASE), LUMBAR: ICD-10-CM

## 2020-06-16 RX ORDER — NYSTATIN 100000 [USP'U]/G
POWDER TOPICAL 2 TIMES DAILY
Qty: 1 BOTTLE | Refills: 3 | Status: SHIPPED | OUTPATIENT
Start: 2020-06-16 | End: 2021-08-10 | Stop reason: SDUPTHER

## 2020-06-16 RX ORDER — DIAZEPAM 5 MG/1
TABLET ORAL
Qty: 60 TAB | Refills: 1 | Status: SHIPPED | OUTPATIENT
Start: 2020-06-16 | End: 2021-08-10 | Stop reason: SDUPTHER

## 2020-06-16 NOTE — PROGRESS NOTES
AMBULATORY PROGRESS NOTE      Patient: Lory Yu             MRN: [id number]     SSN: [id number] Body mass index is 28.18 kg/m². YOB: 1955     AGE: 63 y.o. EX: male    PCP: Ev Morales MD       IMPRESSION //  DIAGNOSIS AND TREATMENT PLAN      DIAGNOSES  1. Chondrocalcinosis articularis    2. Chronic pain of left ankle    3. Left foot pain        Orders Placed This Encounter    AMB POC XRAY, FOOT; COMPLETE, 3+ VIEW     ASK ALL FEMALE PATIENTS IF PREGNANT     Order Specific Question:   Reason for Exam     Answer:   PAIN    POC XRAY, ANKLE; 2 VIEWS     Order Specific Question:   Reason for Exam     Answer:   pain      My impression that he has chondrocalcinosis. He show me some x-rays, of his left ankle, from February 17, 2020, that showed some swelling, to the ankle distal tibia region and 2 in towards his foot. He does have diabetes. He has known chondrocalcinosis, as to his knees. He does take colchicine intermittently, for episodes or exacerbations of his chondrocalcinosis or calcium pyrophosphate crystalline disease. He tells me is not having any pain or discomfort, in his foot and ankle, this current time. He is really here for an assessment. His son, works for [employer], Mr. Bhavana Chong is his name. So we will see him as needed should symptoms worsen but will see him sooner if he develops symptoms that are problematic we will see him sooner. PLAN  1. Instructed pt to remain active and continue with activity modification  2. Continue to monitor left foot for swelling and pain. RTO- prn      HPI //  OBJECTIVE EXAMINATION     Lory Yu IS A 63 y.o. male who presents to my outpatient office for evaluation of: intermittent left foot pain. Currently, he reports no present pain. However, He reports that in Feb. He woke up one morning and noticed spontaneous swelling of the LLE and toes. Since then he has been experiencing some numbness and tingling.  Of note, the pt has been dx with calcium pyrophosphate crystals. On a typical day, he denies any start-up pain. Occasionally, he will feel some stiffness when standing up ashley along plantar fascia region. He denies any redness, warmth. The pt showed pictures of digital swelling to his left foot on Feb 17th. 2020. He reports having 2 previous ankle breaks in high school. But today he is clearly stating that at this time is not having any pain any soreness in his left foot and ankle, at this time. Nothing like his episode of pain and swelling that he had in February 2019. He does not history of diabetes is not taking any insulin no history of Charcot neuroarthropathy. ANKLE/FOOT left    Gait: Normal  Tenderness: No tenderness . Cutaneous:  WNL. Joint Motion: WNL. Full motion ankle midfoot forefoot joint / Tendon Stability: No Ankle or Subtalar instability or joint laxity. No peroneal sublux ability or dislocation  Alignment: Forefoot, Midfoot, Hindfoot WNL. Right foot slightly flatter than left. Neuro Motor/Sensory: NL/NL. Normal monofilament test to the dorsal, plantar, medial, and lateral aspect of his foot and ankle, and to his left lower extremity from the knee down. Vascular: NL foot/ankle pulses. Lymphatics: No extremity lymphedema, No calf swelling, no tenderness to calf muscles.        CHART REVIEW     Patient Active Problem List   Diagnosis Code    Diverticulosis K57.90    BPH (benign prostatic hyperplasia) N40.0    Right shoulder pain M25.511    DM (diabetes mellitus) ([address] E11.9    DDD (degenerative disc disease), lumbar M51.36    Pseudogout M11.20    Family history of prostate cancer in father Z80.41    Colon cancer screening Z12.11    History of colon polyps Z86.010    Rectal polyp K62.1    Left renal mass N28.89    Renal cell carcinoma ([address] C64.9    Hypercholesterolemia E78.00    Renal cell carcinoma of left kidney (HCC) C64.2    Type 2 diabetes with nephropathy [facility]) E11.21        Hanh Sr has been experiencing pain and discomfort confirmed as outlined in the pain assessment outlined below. Pain Assessment  6/16/2020   Location of Pain Foot   Location Modifiers Left   Severity of Pain 0        Hanh Sr  has a past medical history of BPH (benign prostatic hyperplasia) (12/9/2009), DDD (degenerative disc disease), lumbar (12/9/2009), Diverticulosis (12/9/2009), DM (diabetes mellitus) [facility] (12/9/2009), History of colon polyps (4/5/2016), Hypercholesterolemia, Pseudogout (12/9/2009), Renal cell carcinoma of left kidney ([facility] (6/19/15), and Right shoulder pain (12/9/2009). Patients is employed at:         Past Medical History:   Diagnosis Date    BPH (benign prostatic hyperplasia) 12/9/2009    DDD (degenerative disc disease), lumbar 12/9/2009    Diverticulosis 12/9/2009    DM (diabetes mellitus) ([facility] 12/9/2009    History of colon polyps 4/5/2016    Hypercholesterolemia     Pseudogout 12/9/2009    Renal cell carcinoma of left kidney ([facility] 6/19/15    Pathological Stage [id number] pap RCC FG2 s/p left open partial nephrectomy in 6/15     Right shoulder pain 12/9/2009     Current Outpatient Medications   Medication Sig    diazePAM (VALIUM) 5 mg tablet TAKE ONE TABLET BY MOUTH EVERY 12 HOURS AS NEEDED    nystatin (MYCOSTATIN) powder Apply  to affected area two (2) times a day.  Jardiance 10 mg tablet TAKE 1 TABLET DAILY    colchicine 0.6 mg tablet Take 2 tablets X 1 at onset of pain. May repeat 1 tab in one hour prn pain X1    lancets (BD ULTRA-FINE II LANCETS) 30 gauge misc Blood sugar check daily    simvastatin (ZOCOR) 20 mg tablet TAKE 1 TABLET NIGHTLY    glucose blood VI test strips (FREESTYLE LITE STRIPS) strip Blood sugar check daily.     lisinopril (PRINIVIL, ZESTRIL) 5 mg tablet TAKE 1 TABLET DAILY    metFORMIN (GLUCOPHAGE) 500 mg tablet TAKE 2 TABLETS IN THE MORNING AND 2 TABLETS IN THE EVENING    SITagliptin (JANUVIA) 100 mg tablet TAKE 1 TABLET DAILY    aspirin delayed-release 81 mg tablet Take 81 mg by mouth daily. No current facility-administered medications for this visit. THE  FOR Yaima Sexton MD 6/16/2020 . No Known Allergies  Past Surgical History:   Procedure Laterality Date    ENDOSCOPY, COLON, DIAGNOSTIC  2000    sigmoidectomy    HX NEPHRECTOMY Left 6/19/15    Partial, [facility] Crist[facility]ian S[facility]    HX ORTHOPAEDIC  2005    left rotator cuff    HX OTHER SURGICAL  2007    hydrocele    HX TONSILLECTOMY  1978     Social History     Occupational History    Not on file   Tobacco Use    Smoking status: Former Smoker     Packs/day: 1.00     Years: 40.00     Pack years: 40.00     Types: Cigarettes     Last attempt to quit: 2/14/2010     Years since quitting: 10.3    Smokeless tobacco: Never Used   Substance and Sexual Activity    Alcohol use: Yes     Comment: occ    Drug use: No    Sexual activity: Yes     Partners: Female     Family History   Problem Relation Age of Onset    Diabetes Mother     Dementia Maternal Grandmother     Stone Brother         DIAGNOSTIC IMAGING  LAB DATA      Lab Results   Component Value Date/Time    Hemoglobin A1c 7.2 (H) 06/15/2020 08:53 AM    Hemoglobin A1c 8.2 (H) 02/20/2020 09:23 AM    Hemoglobin A1c 8.3 (H) 11/07/2019 08:23 AM    //   Lab Results   Component Value Date/Time    Glucose 142 (H) 02/20/2020 09:25 AM    Glucose (POC) 141 (H) 04/08/2016 09:57 AM        Lab Results   Component Value Date/Time    Hemoglobin A1c (POC) 6.4 04/21/2015 08:53 AM         No results found for: Wandra Laser, XQVID3, XQVID, VD3RIA, [id number]      REVIEW OF SYSTEMS : 6/16/2020  ALL BELOW ARE Negative except : SEE HPI     Constitutional: Negative for fever, chills and weight loss. Neg Weight Loss  Cardiovascular: Negative for chest pain, claudication and leg swelling.  SOB, STARK   Gastrointestinal/Urological: Negative for pain, N/V/D/C, Blood in stool or urine,dysuria, Hematuria, Incontinence  Musculoskeletal: see HPI. Neurological: Negative for dizziness and weakness, headaches,Visual Changes or             Confusion, or Seizures,   Psychiatric/Behavioral: Negative for depression, memory loss and substance abuse. Extremities:  Negative for hair changes, rash or skin lesion changes. Hematologic: Negative for Bleeding problems, bruising, pallor or swollen lymph nodes. Peripheral Vascular: No calf pain, vascular vein tenderness to calf pain// No calf throbbing, posterior knee throbbing pain     DIAGNOSTIC IMAGING          FOOT X RAYS 3 VIEWS Left   6/16/2020    WEIGHT BEARING    X RAYS AT [address] [place] [address]  6/16/2020      Bones: No fractures or dislocations. No focal osteolytic or osteoblastic process     Bone Spurs: No significant bone spurs  Foot Alignment: WNL  Joint Condition: No Significant OA  Soft Tissues: Normal, No radiopaque foreign body and No abnormal calcific densities to soft tissues   No ankle joint effusion in lateral projection. Mineralization: Suggests  no Osteopenia    I have personally reviewed the results of the above study. The interpretation of this study is my professional opinion             ANKLE X RAYS 3 VIEWS Left   X RAYS AT [address] [place] [address]  6/16/2020    WEIGHT BEARING    X RAYS AT [address] [place] [address]  6/16/2020    Bones: No fractures or dislocations. No focal osteolytic or osteoblastic process     Bone Spurs: No significant bone spurs//there is a mild bone spurring, the medial gutter of the left ankle  Alignment: Ankle mortise alignment is congruent, Tibial plafond and talar dome intact. No Osteochondral defects seen   Joint: No Significant OA changes present  Soft Tissues: Normal, No radiopaque foreign body     No abnormal calcific densities to soft tissues    No ankle joint effusion in lateral projection.   Mineralization: Suggests no Osteopenia    I have personally reviewed the results of the above study. The interpretation of this study is my professional opinion    FOOT X RAYS 3 VIEWS Right   6/16/2020    WEIGHT BEARING    X RAYS AT [address] [place] [address]  6/16/2020      Bones: No fractures or dislocations. No focal osteolytic or osteoblastic process     Bone Spurs: No significant bone spurs//Continue to put the order in the computer and tendinosis to you for surgery some spurring the medial gutter. there is no spurring, seen to the right medial gutter, will second remote right distal tip fibula fracture, for many years ago. Alignment foot: WNL   Joint Condition: Joint Condition: No Significant OA  Soft Tissues Normal   No ankle joint effusion in lateral projection. Mineralization: suggests Normal Bone    I have personally reviewed the results of the above study. The interpretation of this study is my professional opinion            FOOT X RAYS 3 VIEWS Right   6/16/2020    WEIGHT BEARING    X RAYS AT [address] [place] [address]  6/16/2020      Bones: No fractures or dislocations. No focal osteolytic or osteoblastic process     Bone Spurs: No significant bone spurs  Foot Alignment: WNL  Joint Condition: No Significant OA  Soft Tissues: Normal, No radiopaque foreign body and No abnormal calcific densities to soft tissues   No ankle joint effusion in lateral projection. Mineralization: Suggests  no Osteopenia    I have personally reviewed the results of the above study. The interpretation of this study is my professional opinion         Please see above section of this report. I have personally reviewed the results of the above study. The interpretation of this study is my professional opinion.       Evelina Feliciano MD  6/16/2020  9:21 AM

## 2020-06-16 NOTE — PATIENT INSTRUCTIONS

## 2020-06-16 NOTE — PROGRESS NOTES
Harriet Arellano is a 63 y.o. male who was seen by synchronous (real-time) audio-video technology on 6/16/2020. Consent: Harriet Arellano, who was seen by synchronous (real-time) audio-video technology, and/or his healthcare decision maker, is aware that this patient-initiated, Telehealth encounter on 6/16/2020 is a billable service, with coverage as determined by his insurance carrier. He is aware that he may receive a bill and has provided verbal consent to proceed: Yes. Assessment & Plan:   Diagnoses and all orders for this visit:    1. Type 2 diabetes mellitus with hyperglycemia, without long-term current use of insulin (HCC)  -     LIPID PANEL; Future  -     METABOLIC PANEL, BASIC; Future  -     AST; Future  -     ALT; Future  -     HEMOGLOBIN A1C WITH EAG; Future    2. Intertrigo  -     nystatin (MYCOSTATIN) powder; Apply  to affected area two (2) times a day. 3. DDD (degenerative disc disease), lumbar  -     diazePAM (VALIUM) 5 mg tablet; TAKE ONE TABLET BY MOUTH EVERY 12 HOURS AS NEEDED        As above,  treatment plan as listed below  Orders Placed This Encounter    LIPID PANEL    METABOLIC PANEL, BASIC    AST    ALT    HEMOGLOBIN A1C WITH EAG    diazePAM (VALIUM) 5 mg tablet    nystatin (MYCOSTATIN) powder     Add mycostatin for the intertriginous areas of the groin;  ontinue Jardiance for now  Labs reordered for next visit  Praised on improvement in A1c; Continue strict diabetic diet. Follow-up and Dispositions    · Return in about 4 months (around 10/16/2020) for well exam.       Mireya Mater is available via [employer]  This has been fully explained to the patient, who indicates understanding. Lower cholesterol diet also advised. 712  Subjective:   Harriet Arellano is a 63 y.o. male who was seen for Diabetes; Back Pain; and Rash    He has been on jardiance ; he has had yeast in the groin area; He has irritation in groin at times. He has noted still some elevated blood sugars.      He had labs yesterday. Here to review them  Today;  Reports some dietary indiscretions since quarantine. He has no new physical complaints; He is followed by ortho for foot pain; Has an appointment at nine. He needs a refill on valium; He has had muscle spasms in his bac. Has DDD of lumbar spine. BPs have been stable. Lab Results   Component Value Date/Time    Hemoglobin A1c 7.2 (H) 06/15/2020 08:53 AM    Hemoglobin A1c (POC) 6.4 04/21/2015 08:53 AM       Prior to Admission medications    Medication Sig Start Date End Date Taking? Authorizing Provider   Jardiance 10 mg tablet TAKE 1 TABLET DAILY 6/9/20  Yes Janis Lawton MD   colchicine 0.6 mg tablet Take 2 tablets X 1 at onset of pain. May repeat 1 tab in one hour prn pain X1 3/27/20  Yes Janis Lawton MD   simvastatin (ZOCOR) 20 mg tablet TAKE 1 TABLET NIGHTLY 10/14/19  Yes Janis Lawton MD   lisinopril (PRINIVIL, ZESTRIL) 5 mg tablet TAKE 1 TABLET DAILY 10/1/19  Yes Janis Lawton MD   metFORMIN (GLUCOPHAGE) 500 mg tablet TAKE 2 TABLETS IN THE MORNING AND 2 TABLETS IN THE EVENING 9/15/19  Yes Janis Lawton MD   diazePAM (VALIUM) 5 mg tablet TAKE ONE TABLET BY MOUTH EVERY 12 HOURS AS NEEDED 7/3/19  Yes Janis Lawton MD   SITagliptin (JANUVIA) 100 mg tablet TAKE 1 TABLET DAILY 7/1/19  Yes Janis Lawton MD   aspirin delayed-release 81 mg tablet Take 81 mg by mouth daily. Yes Provider, Historical   lancets (BD ULTRA-FINE II LANCETS) 30 gauge misc Blood sugar check daily 1/29/20   Janis Lawton MD   glucose blood VI test strips (FREESTYLE LITE STRIPS) strip Blood sugar check daily.  10/3/19   Janis Lawton MD     No Known Allergies    Patient Active Problem List    Diagnosis Date Noted    Type 2 diabetes with nephropathy ([facility] [address] 03/13/2018    Hypercholesterolemia     Rectal polyp 04/08/2016    Colon cancer screening 04/05/2016    History of colon polyps 04/05/2016    Renal cell carcinoma ([facility] [address] 06/25/2015    Left renal mass 06/19/2015    Renal cell carcinoma of left kidney ([facility] [address] 06/19/2015    Family history of prostate cancer in father 04/22/2015    Diverticulosis 12/09/2009    BPH (benign prostatic hyperplasia) 12/09/2009    Right shoulder pain 12/09/2009    DM (diabetes mellitus) ([address] 12/09/2009    DDD (degenerative disc disease), lumbar 12/09/2009    Pseudogout 12/09/2009     No Known Allergies  Past Medical History:   Diagnosis Date    BPH (benign prostatic hyperplasia) 12/9/2009    DDD (degenerative disc disease), lumbar 12/9/2009    Diverticulosis 12/9/2009    DM (diabetes mellitus) ([facility] [address] 12/9/2009    History of colon polyps 4/5/2016    Hypercholesterolemia     Pseudogout 12/9/2009    Renal cell carcinoma of left kidney ([facility] [address] 6/19/15    Pathological Stage [id number] pap RCC FG2 s/p left open partial nephrectomy in 6/15     Right shoulder pain 12/9/2009     Past Surgical History:   Procedure Laterality Date    ENDOSCOPY, COLON, DIAGNOSTIC  2000    sigmoidectomy    HX NEPHRECTOMY Left 6/19/15    Partial, Dr. Cristhian Sweeney, [facility]    HX ORTHOPAEDIC  2005    left rotator cuff    HX OTHER SURGICAL  2007    hydrocele    HX TONSILLECTOMY  1978     Family History   Problem Relation Age of Onset    Diabetes Mother     Dementia Maternal Grandmother     Stone Brother      Social History     Tobacco Use    Smoking status: Former Smoker     Packs/day: 1.00     Years: 40.00     Pack years: 40.00     Types: Cigarettes     Last attempt to quit: 2/14/2010     Years since quitting: 10.3    Smokeless tobacco: Never Used   Substance Use Topics    Alcohol use: Yes     Comment: occ       Review of Systems   Constitutional: Negative for chills and fever. Respiratory: Negative for shortness of breath and wheezing. Cardiovascular: Negative for chest pain and palpitations. Objective: There were no vitals taken for this visit.    General: alert, cooperative, no distress   Mental  status: normal mood, behavior, speech, dress, motor activity, and thought processes, able to follow commands   HENT: NCAT   Neck: no visualized mass   Resp: no respiratory distress   Neuro: no gross deficits   Skin: no discoloration or lesions of concern on visible areas   Psychiatric: normal affect, consistent with stated mood, no evidence of hallucinations     Additional exam findings: none    Lab Results   Component Value Date/Time    Cholesterol, total 153 06/15/2020 08:53 AM    Cholesterol (POC) 136 06/18/2013 08:30 AM    HDL Cholesterol 40 06/15/2020 08:53 AM    HDL Cholesterol (POC) 35 06/18/2013 08:30 AM    LDL Cholesterol (POC) 58 06/18/2013 08:30 AM    LDL, calculated 57 06/15/2020 08:53 AM    VLDL, calculated 56 06/15/2020 08:53 AM    Triglyceride 280 (H) 06/15/2020 08:53 AM    Triglycerides (POC) 219 06/18/2013 08:30 AM    CHOL/HDL Ratio 3.8 06/15/2020 08:53 AM     Lab Results   Component Value Date/Time    Sodium 138 02/20/2020 09:25 AM    Potassium 4.5 02/20/2020 09:25 AM    Chloride 104 02/20/2020 09:25 AM    CO2 29 02/20/2020 09:25 AM    Anion gap 5 02/20/2020 09:25 AM    Glucose 142 (H) 02/20/2020 09:25 AM    BUN 21 (H) 02/20/2020 09:25 AM    Creatinine 1.06 02/20/2020 09:25 AM    BUN/Creatinine ratio 20 02/20/2020 09:25 AM    GFR est AA >60 02/20/2020 09:25 AM    GFR est non-AA >60 02/20/2020 09:25 AM    Calcium 9.4 02/20/2020 09:25 AM     Lab Results   Component Value Date/Time    Hemoglobin A1c 7.2 (H) 06/15/2020 08:53 AM    Hemoglobin A1c (POC) 6.4 04/21/2015 08:53 AM           We discussed the expected course, resolution and complications of the diagnosis(es) in detail. Medication risks, benefits, costs, interactions, and alternatives were discussed as indicated. I advised him to contact the office if his condition worsens, changes or fails to improve as anticipated. He expressed understanding with the diagnosis(es) and plan.      Nadine Jauregui is a 63 y.o. male who was evaluated by a video visit encounter for concerns as above. Patient identification was verified prior to start of the visit. A caregiver was present when appropriate. Due to this being a TeleHealth encounter (During MOZTL-08 public health emergency), evaluation of the following organ systems was limited: Vitals/Constitutional/EENT/Resp/CV/GI//MS/Neuro/Skin/Heme-Lymph-Imm. Pursuant to the emergency declaration under the [facility], [address]5 waiver authority and the [employer] Act, this Virtual  Visit was conducted, with patient's (and/or legal guardian's) consent, to reduce the patient's risk of exposure to COVID-19 and provide necessary medical care. Services were provided through a video synchronous discussion virtually to substitute for in-person clinic visit. Patient was at home and provider was at the office.     Sofie Freitas MD

## 2020-06-23 DIAGNOSIS — E11.65 TYPE 2 DIABETES MELLITUS WITH HYPERGLYCEMIA, WITHOUT LONG-TERM CURRENT USE OF INSULIN (HCC): ICD-10-CM

## 2020-12-06 RX ORDER — METFORMIN HYDROCHLORIDE 500 MG/1
TABLET ORAL
Qty: 360 TAB | Refills: 3 | Status: SHIPPED | OUTPATIENT
Start: 2020-12-06 | End: 2021-12-29

## 2020-12-24 RX ORDER — LISINOPRIL 5 MG/1
TABLET ORAL
Qty: 90 TAB | Refills: 3 | Status: SHIPPED | OUTPATIENT
Start: 2020-12-24 | End: 2021-12-03

## 2021-01-15 RX ORDER — SIMVASTATIN 20 MG/1
TABLET, FILM COATED ORAL
Qty: 90 TAB | Refills: 3 | Status: SHIPPED | OUTPATIENT
Start: 2021-01-15 | End: 2022-01-03

## 2021-06-15 DIAGNOSIS — E11.65 TYPE 2 DIABETES MELLITUS WITH HYPERGLYCEMIA, WITHOUT LONG-TERM CURRENT USE OF INSULIN (HCC): ICD-10-CM

## 2021-07-02 RX ORDER — LANCETS 28 GAUGE
EACH MISCELLANEOUS
Qty: 100 LANCET | Refills: 4 | Status: SHIPPED | OUTPATIENT
Start: 2021-07-02

## 2021-07-02 RX ORDER — EMPAGLIFLOZIN 10 MG/1
TABLET, FILM COATED ORAL
Qty: 90 TABLET | Refills: 3 | Status: SHIPPED | OUTPATIENT
Start: 2021-07-02 | End: 2022-05-31 | Stop reason: DRUGHIGH

## 2021-08-10 ENCOUNTER — OFFICE VISIT (OUTPATIENT)
Dept: FAMILY MEDICINE CLINIC | Age: 66
End: 2021-08-10
Payer: OTHER GOVERNMENT

## 2021-08-10 ENCOUNTER — HOSPITAL ENCOUNTER (OUTPATIENT)
Dept: LAB | Age: 66
Discharge: HOME | End: 2021-08-10
Payer: OTHER GOVERNMENT

## 2021-08-10 VITALS
DIASTOLIC BLOOD PRESSURE: 67 MMHG | BODY MASS INDEX: 27.09 KG/M2 | RESPIRATION RATE: 12 BRPM | TEMPERATURE: 97.5 F | WEIGHT: 204.4 LBS | SYSTOLIC BLOOD PRESSURE: 127 MMHG | OXYGEN SATURATION: 98 % | HEIGHT: 73 IN | HEART RATE: 68 BPM

## 2021-08-10 DIAGNOSIS — E11.65 TYPE 2 DIABETES MELLITUS WITH HYPERGLYCEMIA, WITHOUT LONG-TERM CURRENT USE OF INSULIN (HCC): ICD-10-CM

## 2021-08-10 DIAGNOSIS — M51.36 DDD (DEGENERATIVE DISC DISEASE), LUMBAR: ICD-10-CM

## 2021-08-10 DIAGNOSIS — S22.42XD CLOSED FRACTURE OF MULTIPLE RIBS OF LEFT SIDE WITH ROUTINE HEALING, SUBSEQUENT ENCOUNTER: Primary | ICD-10-CM

## 2021-08-10 DIAGNOSIS — E78.00 HYPERCHOLESTEROLEMIA: ICD-10-CM

## 2021-08-10 DIAGNOSIS — L30.4 INTERTRIGO: ICD-10-CM

## 2021-08-10 LAB
ALBUMIN SERPL-MCNC: 4.2 G/DL (ref 3.4–5)
ALBUMIN/GLOB SERPL: 1.3 {RATIO} (ref 0.8–1.7)
ALP SERPL-CCNC: 72 U/L (ref 45–117)
ALT SERPL-CCNC: 23 U/L (ref 16–61)
ANION GAP SERPL CALC-SCNC: 8 MMOL/L (ref 3–18)
AST SERPL-CCNC: 16 U/L (ref 10–38)
BILIRUB SERPL-MCNC: 0.9 MG/DL (ref 0.2–1)
BUN SERPL-MCNC: 14 MG/DL (ref 7–18)
BUN/CREAT SERPL: 14 (ref 12–20)
CALCIUM SERPL-MCNC: 9.4 MG/DL (ref 8.5–10.1)
CHLORIDE SERPL-SCNC: 105 MMOL/L (ref 100–111)
CHOLEST SERPL-MCNC: 157 MG/DL
CO2 SERPL-SCNC: 27 MMOL/L (ref 21–32)
CREAT SERPL-MCNC: 1 MG/DL (ref 0.6–1.3)
CREAT UR-MCNC: 67 MG/DL (ref 30–125)
EST. AVERAGE GLUCOSE BLD GHB EST-MCNC: 137 MG/DL
GLOBULIN SER CALC-MCNC: 3.3 G/DL (ref 2–4)
GLUCOSE SERPL-MCNC: 130 MG/DL (ref 74–99)
HBA1C MFR BLD: 6.4 % (ref 4.2–5.6)
HDLC SERPL-MCNC: 50 MG/DL (ref 40–60)
HDLC SERPL: 3.1 {RATIO} (ref 0–5)
LDLC SERPL CALC-MCNC: 76.4 MG/DL (ref 0–100)
LIPID PROFILE,FLP: ABNORMAL
MICROALBUMIN UR-MCNC: 4.4 MG/DL (ref 0–3)
MICROALBUMIN/CREAT UR-RTO: 66 MG/G (ref 0–30)
POTASSIUM SERPL-SCNC: 4.6 MMOL/L (ref 3.5–5.5)
PROT SERPL-MCNC: 7.5 G/DL (ref 6.4–8.2)
SODIUM SERPL-SCNC: 140 MMOL/L (ref 136–145)
TRIGL SERPL-MCNC: 153 MG/DL (ref ?–150)
VLDLC SERPL CALC-MCNC: 30.6 MG/DL

## 2021-08-10 PROCEDURE — 82570 ASSAY OF URINE CREATININE: CPT

## 2021-08-10 PROCEDURE — 83036 HEMOGLOBIN GLYCOSYLATED A1C: CPT

## 2021-08-10 PROCEDURE — 80053 COMPREHEN METABOLIC PANEL: CPT

## 2021-08-10 PROCEDURE — 80061 LIPID PANEL: CPT

## 2021-08-10 PROCEDURE — 99214 OFFICE O/P EST MOD 30 MIN: CPT | Performed by: FAMILY MEDICINE

## 2021-08-10 PROCEDURE — 36415 COLL VENOUS BLD VENIPUNCTURE: CPT

## 2021-08-10 RX ORDER — DIAZEPAM 5 MG/1
TABLET ORAL
Qty: 60 TABLET | Refills: 1 | Status: SHIPPED | OUTPATIENT
Start: 2021-08-10 | End: 2022-01-10

## 2021-08-10 RX ORDER — NYSTATIN 100000 [USP'U]/G
POWDER TOPICAL 2 TIMES DAILY
Qty: 1 BOTTLE | Refills: 3 | Status: SHIPPED | OUTPATIENT
Start: 2021-08-10 | End: 2022-01-10

## 2021-08-10 NOTE — PATIENT INSTRUCTIONS

## 2021-08-10 NOTE — PROGRESS NOTES
Chief Complaint   Patient presents with    Diabetes     f/u     Cyst     lower middle of back x1yr but has grown bigger       Pt preferred language for health care discussion is english. Is someone accompanying this pt? no    Is the patient using any DME equipment during [address]? no    Depression Screening:  3 most recent [facility] Screens 6/16/2020 2/20/2020 7/3/2019 11/6/2018 10/20/2017 7/6/2017 7/11/2016   Little interest or pleasure in doing things Not at all Not at all Not at all Not at all Not at all Not at all Not at all   Feeling down, depressed, irritable, or hopeless Not at all Not at all Not at all Not at all Not at all Not at all Not at all   Total Score PHQ 2 0 0 0 0 0 0 0       Learning Assessment:  Learning Assessment 2/18/2013   PRIMARY LEARNER Patient   PRIMARY LANGUAGE ENGLISH   LEARNER PREFERENCE PRIMARY READING     LISTENING   ANSWERED BY patient   RELATIONSHIP SELF         Health Maintenance reviewed and discussed per provider. Yes        Advance Directive:  1. Do you have an advance directive in place? Patient Reply:yes    2. If not, would you like material regarding how to put one in place? Patient Reply: no    Coordination of Care:  1. Have you been to the ER, urgent care clinic since your last visit? Hospitalized since your last visit? yes    2. Have you seen or consulted any other health care providers outside of the [address] [place] [address] since your last visit? Include any pap smears or colon screening.  no    Provider prefers to do his own med reconciliation

## 2021-08-13 NOTE — PROGRESS NOTES
HPI:  Cherie Walters is a 61 y.o. male who presents today with   Chief Complaint   Patient presents with    Rib Injury     f/u     Cyst     lower middle of back x1yr but has grown bigger      Patient is here to follow-up on his rib fractures. Patient was involved in a [address] accident. He rolled over on the [address] and broke ribs. This happened in June and patient presented to the emergency department. Patient states that he was given medications for pain and the pain has since subsided. He is having no difficulty breathing. Patient also has a cyst on his left lower back. It has been present for some time. He would like it removed. While he is here he would like to go ahead and follow-up on his diabetes and other chronic conditions while he is here. He does report dietary compliance for the most part but he does like some more fruits. He thinks this may interfere with his numbers this time. He continues to stay active. He is compliant with his diabetic medications. He gets intermittent intertrigo in the area of the groin. He uses Mycostatin powder for this and request a refill as well. Patient takes Valium at times for his degenerative disc disease with related spasm. He request a refill of this as well. Patient declines low-dose CT scanning at this time. 3 most recent PHQ Screens 8/10/2021   Little interest or pleasure in doing things Not at all   Feeling down, depressed, irritable, or hopeless Not at all   Total Score PHQ 2 0               PMH,  Meds, Allergies, Family History, Social history reviewed      Current Outpatient Medications   Medication Sig Dispense Refill    diazePAM (VALIUM) 5 mg tablet TAKE ONE TABLET BY MOUTH EVERY 12 HOURS AS NEEDED 60 Tablet 1    nystatin (MYCOSTATIN) powder Apply  to affected area two (2) times a day.  1 Bottle 3    Jardiance 10 mg tablet TAKE 1 TABLET DAILY 90 Tablet 3    lancets (FreeStyle Lancets) 28 gauge misc USE TO CHECK BLOOD SUGAR DAILY 100 Lancet 4    SITagliptin (Januvia) 100 mg tablet TAKE 1 TABLET DAILY 90 Tablet 3    simvastatin (ZOCOR) 20 mg tablet TAKE 1 TABLET NIGHTLY 90 Tab 3    lisinopriL (PRINIVIL, ZESTRIL) 5 mg tablet TAKE 1 TABLET DAILY 90 Tab 3    metFORMIN (GLUCOPHAGE) 500 mg tablet TAKE 2 TABLETS IN THE MORNING AND 2 TABLETS IN THE EVENING 360 Tab 3    colchicine 0.6 mg tablet Take 2 tablets X 1 at onset of pain. May repeat 1 tab in one hour prn pain X1 30 Tab 3    glucose blood VI test strips (FREESTYLE LITE STRIPS) strip Blood sugar check daily. 100 Strip 3        No Known Allergies               ROS as per HPI      Visit Vitals  /67 (BP 1 Location: Right arm, BP Patient Position: Sitting, BP Cuff Size: Adult)   Pulse 68   Temp 97.5 °F (36.4 °C) (Temporal)   Resp 12   Ht 6' 1\" (1.854 m)   Wt 204 lb 6.4 oz (92.7 kg)   SpO2 98%   BMI 26.97 kg/m²     Physical Exam   General appearance: alert, cooperative, no distress, appears stated age  Neck: supple, symmetrical, trachea midline, no adenopathy, thyroid: not enlarged, symmetric, no tenderness/mass/nodules, no carotid bruit and no JVD  Lungs: clear to auscultation bilaterally  Heart: regular rate and rhythm, S1, S2 normal, no murmur, click, rub or gallop    Extremities: extremities normal, atraumatic, no cyanosis or edema  Left lower back reveals a 2 cm mobile some mild induration to it. It is consistent with a epidermal skin cyst.    Sensory exam of the foot is normal, tested with the monofilament. Good pulses, no lesions or ulcers, good peripheral pulses. Diagnoses and all orders for this visit:    1. Closed fracture of multiple ribs of left side with routine healing, subsequent encounter    2. DDD (degenerative disc disease), lumbar  -     diazePAM (VALIUM) 5 mg tablet; TAKE ONE TABLET BY MOUTH EVERY 12 HOURS AS NEEDED    3. Intertrigo  -     nystatin (MYCOSTATIN) powder; Apply  to affected area two (2) times a day.     4. Type 2 diabetes mellitus with hyperglycemia, without long-term current use of insulin (HCC)  -     METABOLIC PANEL, COMPREHENSIVE; Future  -     LIPID PANEL; Future  -     MICROALBUMIN, UR, RAND W/ MICROALB/CREAT RATIO; Future  -     HEMOGLOBIN A1C WITH EAG; Future    5. Hypercholesterolemia      As above  Rib fractures are healing and patient is clinically stable  Labs as ordered  Refilled meds needed  Follow-up and Dispositions    · Return in about 4 months (around 12/10/2021) for diabetes. This has been fully explained to the patient, who indicates understanding. An After Visit Summary was printed and given to the patient. Per patient request we will place orders in the chart so that he can get them prior to the next visit. Follow-up and Dispositions    · Return in about 4 months (around 12/10/2021) for diabetes.             Rossana Brink MD

## 2021-08-20 ENCOUNTER — PATIENT MESSAGE (OUTPATIENT)
Dept: FAMILY MEDICINE CLINIC | Age: 66
End: 2021-08-20

## 2021-08-20 DIAGNOSIS — L72.9 SKIN CYST: Primary | ICD-10-CM

## 2021-08-23 NOTE — TELEPHONE ENCOUNTER
From: Shelby Okeefe  To: Aguilar Santiago MD  Sent: 8/20/2021 9:34 AM EDT  Subject: Referral Request    Please send referral request for parrisher derm   The cyst has double in size and is hurting down in muscle.  Cant sleep at night on back and seems to have ruptured within

## 2021-08-27 ENCOUNTER — TELEPHONE (OUTPATIENT)
Dept: FAMILY MEDICINE CLINIC | Age: 66
End: 2021-08-27

## 2021-08-27 NOTE — TELEPHONE ENCOUNTER
Patient calling stating [facility] has not received referral. Referral has been faxed to [phone]

## 2021-09-03 ENCOUNTER — TELEPHONE (OUTPATIENT)
Dept: FAMILY MEDICINE CLINIC | Age: 66
End: 2021-09-03

## 2021-12-03 RX ORDER — LISINOPRIL 5 MG/1
TABLET ORAL
Qty: 90 TABLET | Refills: 3 | Status: SHIPPED | OUTPATIENT
Start: 2021-12-03

## 2021-12-13 ENCOUNTER — HOSPITAL ENCOUNTER (OUTPATIENT)
Dept: LAB | Age: 66
Discharge: HOME | End: 2021-12-13
Payer: OTHER GOVERNMENT

## 2021-12-13 ENCOUNTER — OFFICE VISIT (OUTPATIENT)
Dept: FAMILY MEDICINE CLINIC | Age: 66
End: 2021-12-13
Payer: OTHER GOVERNMENT

## 2021-12-13 VITALS
SYSTOLIC BLOOD PRESSURE: 129 MMHG | HEIGHT: 73 IN | OXYGEN SATURATION: 97 % | WEIGHT: 207.8 LBS | BODY MASS INDEX: 27.54 KG/M2 | HEART RATE: 75 BPM | RESPIRATION RATE: 16 BRPM | DIASTOLIC BLOOD PRESSURE: 72 MMHG | TEMPERATURE: 96.8 F

## 2021-12-13 DIAGNOSIS — E78.00 HYPERCHOLESTEROLEMIA: ICD-10-CM

## 2021-12-13 DIAGNOSIS — E11.65 TYPE 2 DIABETES MELLITUS WITH HYPERGLYCEMIA, WITHOUT LONG-TERM CURRENT USE OF INSULIN (HCC): Primary | ICD-10-CM

## 2021-12-13 DIAGNOSIS — E11.65 TYPE 2 DIABETES MELLITUS WITH HYPERGLYCEMIA, WITHOUT LONG-TERM CURRENT USE OF INSULIN (HCC): ICD-10-CM

## 2021-12-13 DIAGNOSIS — R07.9 CHEST PAIN, UNSPECIFIED TYPE: ICD-10-CM

## 2021-12-13 DIAGNOSIS — Z87.891 PERSONAL HISTORY OF TOBACCO USE, PRESENTING HAZARDS TO HEALTH: ICD-10-CM

## 2021-12-13 LAB
ALBUMIN SERPL-MCNC: 3.9 G/DL (ref 3.4–5)
ALBUMIN/GLOB SERPL: 1.2 {RATIO} (ref 0.8–1.7)
ALP SERPL-CCNC: 66 U/L (ref 45–117)
ALT SERPL-CCNC: 25 U/L (ref 16–61)
ANION GAP SERPL CALC-SCNC: 4 MMOL/L (ref 3–18)
AST SERPL-CCNC: 15 U/L (ref 10–38)
BILIRUB SERPL-MCNC: 0.6 MG/DL (ref 0.2–1)
BUN SERPL-MCNC: 12 MG/DL (ref 7–18)
BUN/CREAT SERPL: 12 (ref 12–20)
CALCIUM SERPL-MCNC: 8.6 MG/DL (ref 8.5–10.1)
CHLORIDE SERPL-SCNC: 106 MMOL/L (ref 100–111)
CHOLEST SERPL-MCNC: 141 MG/DL
CO2 SERPL-SCNC: 28 MMOL/L (ref 21–32)
CREAT SERPL-MCNC: 0.98 MG/DL (ref 0.6–1.3)
EST. AVERAGE GLUCOSE BLD GHB EST-MCNC: 154 MG/DL
GLOBULIN SER CALC-MCNC: 3.3 G/DL (ref 2–4)
GLUCOSE SERPL-MCNC: 170 MG/DL (ref 74–99)
HBA1C MFR BLD: 7 % (ref 4.2–5.6)
HDLC SERPL-MCNC: 41 MG/DL (ref 40–60)
HDLC SERPL: 3.4 {RATIO} (ref 0–5)
LDLC SERPL CALC-MCNC: 44.8 MG/DL (ref 0–100)
LIPID PROFILE,FLP: ABNORMAL
POTASSIUM SERPL-SCNC: 4.3 MMOL/L (ref 3.5–5.5)
PROT SERPL-MCNC: 7.2 G/DL (ref 6.4–8.2)
SODIUM SERPL-SCNC: 138 MMOL/L (ref 136–145)
TRIGL SERPL-MCNC: 276 MG/DL (ref ?–150)
VLDLC SERPL CALC-MCNC: 55.2 MG/DL

## 2021-12-13 PROCEDURE — 99214 OFFICE O/P EST MOD 30 MIN: CPT | Performed by: FAMILY MEDICINE

## 2021-12-13 PROCEDURE — 83036 HEMOGLOBIN GLYCOSYLATED A1C: CPT

## 2021-12-13 PROCEDURE — 93000 ELECTROCARDIOGRAM COMPLETE: CPT | Performed by: FAMILY MEDICINE

## 2021-12-13 PROCEDURE — 80061 LIPID PANEL: CPT

## 2021-12-13 PROCEDURE — 80053 COMPREHEN METABOLIC PANEL: CPT

## 2021-12-13 NOTE — PROGRESS NOTES
HPI:  Lalita Vogel is a 61 y.o. male who presents today with   Chief Complaint   Patient presents with    Diabetes     4 m f/u      Diabetespatient is due for blood work. His last A1c controlled at 6.4%; patient complies with his diabetic diet. Will see ophthalmology on Friday. Lab Results   Component Value Date/Time    Hemoglobin A1c 6.4 (H) 08/10/2021 08:59 AM    Hemoglobin A1c (POC) 6.4 04/21/2015 08:53 AM         Reports CP for the last 6 months;  May be exertional as when he is walking through the woods. No muscle TTP; No CP today. Last time he had chest pain symptoms was 4-5 days ago. Chest pain lasts 1-2 minutes. + FH CAD. Patient also has hypercholesterolemia. Patient is on simvastatin. He has no other complaints. No refills requested. 3 most recent PHQ Screens 12/13/2021   PHQ Not Done Patient refuses   Little interest or pleasure in doing things Not at all   Feeling down, depressed, irritable, or hopeless Not at all   Total Score PHQ 2 0       Patient has a history of tobacco use. He does agree to low-dose CT scanning for lung cancer screening. Shared decision making for testing has been done. PMH,  Meds, Allergies, Family History, Social history reviewed      Current Outpatient Medications   Medication Sig Dispense Refill    lisinopriL (PRINIVIL, ZESTRIL) 5 mg tablet TAKE 1 TABLET DAILY 90 Tablet 3    diazePAM (VALIUM) 5 mg tablet TAKE ONE TABLET BY MOUTH EVERY 12 HOURS AS NEEDED 60 Tablet 1    nystatin (MYCOSTATIN) powder Apply  to affected area two (2) times a day.  1 Bottle 3    Jardiance 10 mg tablet TAKE 1 TABLET DAILY 90 Tablet 3    lancets (FreeStyle Lancets) 28 gauge misc USE TO CHECK BLOOD SUGAR DAILY 100 Lancet 4    SITagliptin (Januvia) 100 mg tablet TAKE 1 TABLET DAILY 90 Tablet 3    simvastatin (ZOCOR) 20 mg tablet TAKE 1 TABLET NIGHTLY 90 Tab 3    metFORMIN (GLUCOPHAGE) 500 mg tablet TAKE 2 TABLETS IN THE MORNING AND 2 TABLETS IN THE EVENING 360 Tab 3  colchicine 0.6 mg tablet Take 2 tablets X 1 at onset of pain. May repeat 1 tab in one hour prn pain X1 30 Tab 3    glucose blood VI test strips (FREESTYLE LITE STRIPS) strip Blood sugar check daily. 100 Strip 3        No Known Allergies               ROS as per HPI      Visit Vitals  /72 (BP 1 Location: Left upper arm, BP Patient Position: Sitting, BP Cuff Size: Large adult)   Pulse 75   Temp 96.8 °F (36 °C) (Temporal)   Resp 16   Ht 6' 1\" (1.854 m)   Wt 207 lb 12.8 oz (94.3 kg)   SpO2 97%   BMI 27.42 kg/m²     Physical Exam   General appearance: alert, cooperative, no distress, appears stated age  Neck: supple, symmetrical, trachea midline, no adenopathy, thyroid: not enlarged, symmetric, no tenderness/mass/nodules, no carotid bruit and no JVD; external ear canal on the right reveals a mild amount of cerumen in the canal; left external ear canal is free of cerumen. Lungs: clear to auscultation bilaterally  Heart: regular rate and rhythm, S1, S2 normal, no murmur, click, rub or gallop; there is no chest wall tenderness to palpation    Extremities: extremities normal, atraumatic, no cyanosis or edema      Assessment/Plan:    Diagnoses and all orders for this visit:    1. Type 2 diabetes mellitus with hyperglycemia, without long-term current use of insulin (HCC)  -     HEMOGLOBIN A1C WITH EAG; Future    2. Hypercholesterolemia  -     LIPID PANEL; Future  -     METABOLIC PANEL, COMPREHENSIVE; Future    3. Personal history of tobacco use, presenting hazards to health  -     CT LOW DOSE LUNG CANCER SCREENING; Future    4. Chest pain, unspecified type  -     AMB POC EKG ROUTINE W/ 12 LEADS, INTER & REP  -     NUCLEAR CARDIAC STRESS TEST; Future    As above  Patient stable  Stress test today  EKG today is unremarkable  Patient advised to present to the emergency department for worsening chest pain, shortness of breath, presyncope or syncope  An After Visit Summary was printed and given to the patient.   This has been fully explained to the patient, who indicates understanding. Follow-up and Dispositions    · Return in about 4 months (around 4/13/2022) for well exam.        Patient would like his lab work placed for his next visit. Per chart review he had labs placed before this visit but it appears that the labs were canceled for uncertain reasons. Javad Redding MD      Discussed with patient current guidelines for screening for lung cancer. Current recommendations are to obtain yearly screening LDCT yearly for age 46-80, or until smoke free for 15 years. Patient has 45 pack year history of cigarette smoking and currently has quit smoking. Discussed with patient risks and benefits of screening, including over-diagnosis, false positive rate, and total radiation exposure. Patient currently exhibits no signs or symptoms suggestive of lung cancer. Discussed with patient importance of compliance with yearly annual lung cancer screenings and willingness to undergo diagnosis and treatment if screening scan is positive. In addition, patient was counseled regarding (remaining smoke free/total smoking cessation).

## 2021-12-13 NOTE — PATIENT INSTRUCTIONS

## 2021-12-13 NOTE — PROGRESS NOTES
1. \"Have you been to the ER, urgent care clinic since your last visit? Hospitalized since your last visit? \" No    2. \"Have you seen or consulted any other health care providers outside of the [address] [place] [address] since your last visit? \" No     3. For patients aged 39-70: Has the patient had a colonoscopy / FIT/ Cologuard?  No

## 2021-12-29 ENCOUNTER — NURSE NAVIGATOR (OUTPATIENT)
Dept: OTHER | Age: 66
End: 2021-12-29

## 2021-12-29 RX ORDER — METFORMIN HYDROCHLORIDE 500 MG/1
TABLET ORAL
Qty: 360 TABLET | Refills: 3 | Status: SHIPPED | OUTPATIENT
Start: 2021-12-29

## 2021-12-29 NOTE — PROGRESS NOTES
Referring Provider: Loni Smith MD      Lung Cancer Risk Profile:   Age: 61  Gender: Male  Height: 73\"  Weight: 207#    Smoking History:  Smoking Status: past use  # years smoking:   # years quit: 11 (2010)  Packs/day:   Pack years: 39, per provider note    Patient discussed smoking cessation with PCP: Yes, per provider note    Patient participated in shared decision making process with PCP: Yes, per provider note    Patient is currently experiencing symptoms: No    If yes what symptoms:     Co-Morbidities:      Cancer History:      Additional Risk Factors:         Patient's smoking history verified via provider note. Patient meets LDCT lung cancer screening criteria.        KEKE MendozaN, RN, OCN  Lung Health Nurse Navigator

## 2022-01-03 ENCOUNTER — HOSPITAL ENCOUNTER (OUTPATIENT)
Dept: NON INVASIVE DIAGNOSTICS | Age: 67
Discharge: HOME | End: 2022-01-03
Attending: FAMILY MEDICINE
Payer: OTHER GOVERNMENT

## 2022-01-03 ENCOUNTER — HOSPITAL ENCOUNTER (OUTPATIENT)
Dept: NUCLEAR MEDICINE | Age: 67
Discharge: HOME | End: 2022-01-03
Attending: FAMILY MEDICINE
Payer: OTHER GOVERNMENT

## 2022-01-03 ENCOUNTER — HOSPITAL ENCOUNTER (OUTPATIENT)
Dept: CT IMAGING | Age: 67
Discharge: HOME | End: 2022-01-03
Attending: FAMILY MEDICINE
Payer: OTHER GOVERNMENT

## 2022-01-03 VITALS
WEIGHT: 208 LBS | DIASTOLIC BLOOD PRESSURE: 72 MMHG | SYSTOLIC BLOOD PRESSURE: 120 MMHG | BODY MASS INDEX: 28.17 KG/M2 | HEIGHT: 72 IN

## 2022-01-03 VITALS — HEIGHT: 72 IN | WEIGHT: 208 LBS | BODY MASS INDEX: 28.17 KG/M2

## 2022-01-03 VITALS
HEIGHT: 72 IN | BODY MASS INDEX: 28.17 KG/M2 | SYSTOLIC BLOOD PRESSURE: 120 MMHG | DIASTOLIC BLOOD PRESSURE: 72 MMHG | WEIGHT: 208 LBS

## 2022-01-03 DIAGNOSIS — R07.9 CHEST PAIN, UNSPECIFIED TYPE: ICD-10-CM

## 2022-01-03 DIAGNOSIS — R94.39 ABNORMAL STRESS TEST: ICD-10-CM

## 2022-01-03 DIAGNOSIS — E11.65 TYPE 2 DIABETES MELLITUS WITH HYPERGLYCEMIA, WITHOUT LONG-TERM CURRENT USE OF INSULIN (HCC): Primary | ICD-10-CM

## 2022-01-03 DIAGNOSIS — E78.00 HYPERCHOLESTEROLEMIA: ICD-10-CM

## 2022-01-03 DIAGNOSIS — Z87.891 PERSONAL HISTORY OF TOBACCO USE, PRESENTING HAZARDS TO HEALTH: ICD-10-CM

## 2022-01-03 PROCEDURE — 71271 CT THORAX LUNG CANCER SCR C-: CPT

## 2022-01-03 PROCEDURE — 93018 CV STRESS TEST I&R ONLY: CPT | Performed by: INTERNAL MEDICINE

## 2022-01-03 PROCEDURE — A9500 TC99M SESTAMIBI: HCPCS | Performed by: FAMILY MEDICINE

## 2022-01-03 PROCEDURE — 74011250636 HC RX REV CODE- 250/636: Performed by: FAMILY MEDICINE

## 2022-01-03 PROCEDURE — 78452 HT MUSCLE IMAGE SPECT MULT: CPT | Performed by: INTERNAL MEDICINE

## 2022-01-03 PROCEDURE — 74011250637 HC RX REV CODE- 250/637: Performed by: FAMILY MEDICINE

## 2022-01-03 PROCEDURE — 93017 CV STRESS TEST TRACING ONLY: CPT

## 2022-01-03 PROCEDURE — 93016 CV STRESS TEST SUPVJ ONLY: CPT | Performed by: INTERNAL MEDICINE

## 2022-01-03 RX ORDER — TETRAKIS(2-METHOXYISOBUTYLISOCYANIDE)COPPER(I) TETRAFLUOROBORATE 1 MG/ML
11 INJECTION, POWDER, LYOPHILIZED, FOR SOLUTION INTRAVENOUS
Status: COMPLETED | OUTPATIENT
Start: 2022-01-03 | End: 2022-01-03

## 2022-01-03 RX ORDER — SIMVASTATIN 20 MG/1
TABLET, FILM COATED ORAL
Qty: 90 TABLET | Refills: 3 | Status: SHIPPED | OUTPATIENT
Start: 2022-01-03

## 2022-01-03 RX ORDER — SODIUM CHLORIDE 9 MG/ML
250 INJECTION, SOLUTION INTRAVENOUS ONCE
Status: COMPLETED | OUTPATIENT
Start: 2022-01-03 | End: 2022-01-03

## 2022-01-03 RX ADMIN — Medication 33 MILLICURIE: at 11:00

## 2022-01-03 RX ADMIN — SODIUM CHLORIDE 250 ML: 900 INJECTION, SOLUTION INTRAVENOUS at 11:00

## 2022-01-03 RX ADMIN — TECHNETIUM TC-99M SESTAMIBI 11 MILLICURIE: 1 INJECTION INTRAVENOUS at 09:30

## 2022-01-03 NOTE — PROGRESS NOTES
Patient was given 11 mCi of Sestemibi for the Resting pictures. Was stressed on the Treadmill and given 33 mCi of Sestemibi of the Stress pictures. Armband was removed and disposed of before the patient left.

## 2022-01-04 LAB
NUC STRESS EJECTION FRACTION: 54 %
STRESS ANGINA INDEX: 0
STRESS BASELINE DIAS BP: 72 MMHG
STRESS BASELINE HR: 69 BPM
STRESS BASELINE SYS BP: 120 MMHG
STRESS ESTIMATED WORKLOAD: 1 METS
STRESS EXERCISE DUR MIN: ABNORMAL
STRESS PEAK DIAS BP: 60 MMHG
STRESS PEAK SYS BP: 166 MMHG
STRESS PERCENT HR ACHIEVED: 84 %
STRESS POST PEAK HR: 131 BPM
STRESS RATE PRESSURE PRODUCT: ABNORMAL BPM*MMHG
STRESS TARGET HR: 156 BPM
TID: 0.99

## 2022-01-04 NOTE — PROGRESS NOTES
Patient stress test results are noted. Patient has risk factors for coronary disease including diabetes, hypercholesterolemia, previous tobacco abuse. We will place an order for cardiology consult. Patient has been sent a [employer] message.

## 2022-01-05 ENCOUNTER — TELEPHONE (OUTPATIENT)
Dept: FAMILY MEDICINE CLINIC | Age: 67
End: 2022-01-05

## 2022-01-05 ENCOUNTER — OFFICE VISIT (OUTPATIENT)
Dept: CARDIOLOGY CLINIC | Age: 67
End: 2022-01-05
Payer: OTHER GOVERNMENT

## 2022-01-05 ENCOUNTER — HOSPITAL ENCOUNTER (OUTPATIENT)
Dept: LAB | Age: 67
Discharge: HOME | End: 2022-01-05
Payer: OTHER GOVERNMENT

## 2022-01-05 VITALS
SYSTOLIC BLOOD PRESSURE: 129 MMHG | HEART RATE: 66 BPM | WEIGHT: 211 LBS | HEIGHT: 72 IN | DIASTOLIC BLOOD PRESSURE: 73 MMHG | BODY MASS INDEX: 28.58 KG/M2 | OXYGEN SATURATION: 98 %

## 2022-01-05 DIAGNOSIS — I20.8 STABLE ANGINA (HCC): ICD-10-CM

## 2022-01-05 DIAGNOSIS — E11.9 TYPE 2 DIABETES MELLITUS WITHOUT COMPLICATION, UNSPECIFIED WHETHER LONG TERM INSULIN USE (HCC): ICD-10-CM

## 2022-01-05 DIAGNOSIS — R94.39 ABNORMAL STRESS TEST: ICD-10-CM

## 2022-01-05 DIAGNOSIS — E78.5 DYSLIPIDEMIA: ICD-10-CM

## 2022-01-05 DIAGNOSIS — I20.8 STABLE ANGINA (HCC): Primary | ICD-10-CM

## 2022-01-05 DIAGNOSIS — R07.9 CHEST PAIN, UNSPECIFIED TYPE: Primary | ICD-10-CM

## 2022-01-05 LAB
ALBUMIN SERPL-MCNC: 4 G/DL (ref 3.4–5)
ALBUMIN/GLOB SERPL: 1.2 {RATIO} (ref 0.8–1.7)
ALP SERPL-CCNC: 66 U/L (ref 45–117)
ALT SERPL-CCNC: 22 U/L (ref 16–61)
ANION GAP SERPL CALC-SCNC: 6 MMOL/L (ref 3–18)
APTT PPP: 26.1 SEC (ref 23–36.4)
AST SERPL-CCNC: 12 U/L (ref 10–38)
BILIRUB SERPL-MCNC: 0.3 MG/DL (ref 0.2–1)
BUN SERPL-MCNC: 16 MG/DL (ref 7–18)
BUN/CREAT SERPL: 14 (ref 12–20)
CALCIUM SERPL-MCNC: 9.2 MG/DL (ref 8.5–10.1)
CHLORIDE SERPL-SCNC: 104 MMOL/L (ref 100–111)
CO2 SERPL-SCNC: 29 MMOL/L (ref 21–32)
CREAT SERPL-MCNC: 1.15 MG/DL (ref 0.6–1.3)
ERYTHROCYTE [DISTWIDTH] IN BLOOD BY AUTOMATED COUNT: 11.7 % (ref 11.6–14.5)
GLOBULIN SER CALC-MCNC: 3.4 G/DL (ref 2–4)
GLUCOSE SERPL-MCNC: 224 MG/DL (ref 74–99)
HCT VFR BLD AUTO: 44.1 % (ref 36–48)
HGB BLD-MCNC: 14.7 G/DL (ref 13–16)
INR PPP: 0.9 (ref 0.8–1.2)
MCH RBC QN AUTO: 31.7 PG (ref 24–34)
MCHC RBC AUTO-ENTMCNC: 33.3 G/DL (ref 31–37)
MCV RBC AUTO: 95.2 FL (ref 78–100)
NRBC # BLD: 0 K/UL (ref 0–0.01)
NRBC BLD-RTO: 0 PER 100 WBC
PLATELET # BLD AUTO: 331 K/UL (ref 135–420)
PMV BLD AUTO: 11 FL (ref 9.2–11.8)
POTASSIUM SERPL-SCNC: 4.1 MMOL/L (ref 3.5–5.5)
PROT SERPL-MCNC: 7.4 G/DL (ref 6.4–8.2)
PROTHROMBIN TIME: 11.8 SEC (ref 11.5–15.2)
RBC # BLD AUTO: 4.63 M/UL (ref 4.35–5.65)
SODIUM SERPL-SCNC: 139 MMOL/L (ref 136–145)
TSH SERPL DL<=0.05 MIU/L-ACNC: 0.9 UIU/ML (ref 0.36–3.74)
WBC # BLD AUTO: 7.5 K/UL (ref 4.6–13.2)

## 2022-01-05 PROCEDURE — 85610 PROTHROMBIN TIME: CPT

## 2022-01-05 PROCEDURE — 36415 COLL VENOUS BLD VENIPUNCTURE: CPT

## 2022-01-05 PROCEDURE — 99204 OFFICE O/P NEW MOD 45 MIN: CPT | Performed by: INTERNAL MEDICINE

## 2022-01-05 PROCEDURE — 84443 ASSAY THYROID STIM HORMONE: CPT

## 2022-01-05 PROCEDURE — 85730 THROMBOPLASTIN TIME PARTIAL: CPT

## 2022-01-05 PROCEDURE — 85027 COMPLETE CBC AUTOMATED: CPT

## 2022-01-05 PROCEDURE — 80053 COMPREHEN METABOLIC PANEL: CPT

## 2022-01-05 NOTE — TELEPHONE ENCOUNTER
Cardiology office calling about patient's referral. Informed her that referral was placed on 1/3/2022. Referral is now showing as \"closed. \" Requesting to have referral resubmitted if possible

## 2022-01-05 NOTE — PATIENT INSTRUCTIONS
Instructions    Patients Name:  Josh Lucero    1. [facility] 12:00.      2. Please go to [facility] and park in the outpatient parking lot that is located around to the back of the hospital and enter through the [facility] building. Once you enter through the [facility] check in with the  there. The  will either give you directions or assist you in getting to the cath holding area. 3. You are not to eat anything after midnight before the procedure. Please continue to drink fluids up until 12:00.  (water, juice, black coffee, soft drinks). Do not drink milk or milk products or anything with cream. You may take medications(except for diabetes) with a small sip of water before 6am on the day of the procedure. .    4. If you are diabetic, do not take your insulin/sugar pill the morning of the procedure. 5. MEDICATION INSTRUCTIONS:   Please take your morning medications with the following special instructions:      [x]          Please make sure to take your Blood pressure medication : With just enough water to swallow. [x]          Take your Aspirin and/or Plavix. With just enough water to swallow. []          Stop your Coumadin on   and do not resume it until after the procedure.      []          Take Prednisone 60 mg and Benadryl 25 mg by mouth at Bedtime on  and again on  at . This is to prevent you from having an allergic reaction to the dye. 6. We encourage families to wait in the waiting room on the first floor while the procedure is being done. The Doctor will come out and talk with you as soon as the procedure is over. 7. There is the possibility that you may spend the night in the hospital, depending on the results of the procedure. This will be determined after the procedure is done. If angioplasty or stent is planned, you will stay at least one day.      8. If you or your family have any questions, please call our office Monday Friday, 9:00 a. m.4:30 p.m.,  At [phone], and ask to speak to one of the nurses. Patient will discuss with wife and call back regarding location of cath. Patient states that he will proceed with Dr Juan Ramon Miller at . Emanuel Horton and Fani Jurado have called Dr Rin Go office to obtain an Insurance referral from [employer] we have spoken to the  which states that they will send the nurse a message as of right now we have not heard back from them as of yet. We will not be able to proceed with pre certification for Cath until we obtain the insurance referral from [employer].

## 2022-01-05 NOTE — PROGRESS NOTES
HISTORY OF PRESENT ILLNESS  Erika Gerard is a 61 y.o. male. New Patient  The history is provided by the patient. This is a recurrent problem. The problem occurs daily. Associated symptoms include chest pain and shortness of breath. Pertinent negatives include no abdominal pain and no headaches. Chest Pain (Angina)   The history is provided by the patient. This is a recurrent problem. The problem has not changed since onset. The pain is associated with exertion and normal activity. The pain is present in the left side. The pain is at a severity of 3/10. The pain is mild. The quality of the pain is described as tightness. The pain does not radiate. Associated symptoms include shortness of breath. Pertinent negatives include no abdominal pain, no claudication, no cough, no diaphoresis, no dizziness, no fever, no headaches, no hemoptysis, no nausea, no orthopnea, no palpitations, no PND, no sputum production, no vomiting and no weakness. Risk factors include diabetes mellitus, male gender and dyslipidemia. Shortness of Breath  The history is provided by the patient. This is a chronic problem. The problem occurs intermittently. The problem has not changed since onset. Associated symptoms include chest pain. Pertinent negatives include no fever, no headaches, no ear pain, no neck pain, no cough, no sputum production, no hemoptysis, no wheezing, no PND, no orthopnea, no vomiting, no abdominal pain, no rash, no leg swelling and no claudication. Review of Systems   Constitutional: Negative for chills, diaphoresis, fever and weight loss. HENT: Negative for ear pain and hearing loss. Eyes: Negative for blurred vision. Respiratory: Positive for shortness of breath. Negative for cough, hemoptysis, sputum production, wheezing and stridor. Cardiovascular: Positive for chest pain. Negative for palpitations, orthopnea, claudication, leg swelling and PND.    Gastrointestinal: Negative for abdominal pain, heartburn, nausea and vomiting. Musculoskeletal: Negative for myalgias and neck pain. Skin: Negative for rash. Neurological: Negative for dizziness, tingling, tremors, focal weakness, loss of consciousness, weakness and headaches. Psychiatric/Behavioral: Negative for depression and suicidal ideas.      Family History   Problem Relation Age of Onset    Diabetes Mother     Dementia Maternal Grandmother    Dellia Severance Brother        Past Medical History:   Diagnosis Date    BPH (benign prostatic hyperplasia) 2009    DDD (degenerative disc disease), lumbar 2009    Diverticulosis 2009    DM (diabetes mellitus) ([facility] [address] 2009    History of colon polyps 2016    Hypercholesterolemia     Pseudogout 2009    Renal cell carcinoma of left kidney ([facility] [address] 6/19/15    Pathological Stage [id number] pap RCC FG2 s/p left open partial nephrectomy in 6/15     Right shoulder pain 2009       Past Surgical History:   Procedure Laterality Date    ENDOSCOPY, COLON, DIAGNOSTIC      sigmoidectomy    HX NEPHRECTOMY Left 6/19/15    Partial, [facility]vis[facility]    HX ORTHOPAEDIC  2005    left rotator cuff    HX OTHER SURGICAL  2007    hydrocele    HX TONSILLECTOMY         Social History     Tobacco Use    Smoking status: Former Smoker     Packs/day: 1.00     Years: 40.00     Pack years: 40.00     Types: Cigarettes     Quit date: 2010     Years since quittin.8    Smokeless tobacco: Never Used   Substance Use Topics    Alcohol use: Yes     Comment: occ       No Known Allergies    Outpatient Medications Marked as Taking for the 22 encounter (Office Visit) with Nemesio Hull MD   Medication Sig Dispense Refill    simvastatin (ZOCOR) 20 mg tablet TAKE 1 TABLET NIGHTLY 90 Tablet 3    metFORMIN (GLUCOPHAGE) 500 mg tablet TAKE 2 TABLETS IN THE MORNING AND 2 TABLETS IN THE EVENING 360 Tablet 3    lisinopriL (PRINIVIL, ZESTRIL) 5 mg tablet TAKE 1 TABLET DAILY 90 Tablet 3    diazePAM (VALIUM) 5 mg tablet TAKE ONE TABLET BY MOUTH EVERY 12 HOURS AS NEEDED 60 Tablet 1    nystatin (MYCOSTATIN) powder Apply  to affected area two (2) times a day. 1 Bottle 3    Jardiance 10 mg tablet TAKE 1 TABLET DAILY 90 Tablet 3    lancets (FreeStyle Lancets) 28 gauge misc USE TO CHECK BLOOD SUGAR DAILY 100 Lancet 4    SITagliptin (Januvia) 100 mg tablet TAKE 1 TABLET DAILY 90 Tablet 3    colchicine 0.6 mg tablet Take 2 tablets X 1 at onset of pain. May repeat 1 tab in one hour prn pain X1 30 Tab 3    glucose blood VI test strips (FREESTYLE LITE STRIPS) strip Blood sugar check daily. 100 Strip 3        Visit Vitals  /73 (BP 1 Location: Left upper arm, BP Patient Position: Sitting, BP Cuff Size: Adult)   Pulse 66   Ht 6' (1.829 m)   Wt 95.7 kg (211 lb)   SpO2 98%   BMI 28.62 kg/m²       Physical Exam  Constitutional:       Appearance: He is well-developed. HENT:      Head: Normocephalic and atraumatic. Eyes:      General: No scleral icterus. Conjunctiva/sclera: Conjunctivae normal.   Neck:      Vascular: No JVD. Cardiovascular:      Rate and Rhythm: Normal rate and regular rhythm. Heart sounds: Normal heart sounds. No murmur heard. No friction rub. No gallop. Pulmonary:      Effort: Pulmonary effort is normal. No respiratory distress. Breath sounds: Normal breath sounds. No stridor. No wheezing or rales. Chest:      Chest wall: No tenderness. Abdominal:      General: There is no distension. Tenderness: There is no abdominal tenderness. Musculoskeletal:         General: No tenderness. Normal range of motion. Cervical back: Normal range of motion and neck supple. Lymphadenopathy:      Cervical: No cervical adenopathy. Skin:     Findings: No erythema or rash. Neurological:      Mental Status: He is alert and oriented to person, place, and time. Cranial Nerves: No cranial nerve deficit.    Psychiatric:         Behavior: Behavior normal.     ekg sinus rhythm with no acute st-t changes    01/03/22    NUCLEAR CARDIAC STRESS TEST 01/03/2022 1/4/2022    Interpretation Summary    Nuclear Findings: LVEF measures 54%. TID ratio is 0.99.    Nuclear Findings: There is a mild severity left ventricular stress perfusion defect. There is normal wall motion in the defect area. The defect appears to be an artifact caused by subdiaphragmatic activity. Perfusion defect was visually and quantitatively present.   Nuclear Findings: The study is most consistent with artifact but cannot rule out a small degree of myocardial ischemia. Findings suggest a low risk of myocardial ischemia.   ECG: Resting ECG demonstrates normal sinus rhythm.   Stress Test: A Amaury protocol stress test was performed. EKG portion revealed 2 mm st segment depression in inferolateral leads  Recommend clinical correlation. Signed by: Laila Ward MD on 1/3/2022  7:10 PM      ASSESSMENT and PLAN    ICD-10-CM ICD-9-CM    1. Stable angina ([address]  X80.4 893.0 METABOLIC PANEL, COMPREHENSIVE      PTT      PROTHROMBIN TIME + INR      TSH 3RD GENERATION      CBC W/O DIFF      CASE REQUEST CATH LAB   2. Dyslipidemia  E78.5 272.4    3. Type 2 diabetes mellitus without complication, unspecified whether long term insulin use (HCC)  E11.9 250.00    4. Abnormal stress test  R94.39 794.39      Orders Placed This Encounter    METABOLIC PANEL, COMPREHENSIVE     Standing Status:   Future     Standing Expiration Date:   1/6/2023    PTT     Standing Status:   Future     Standing Expiration Date:   1/6/2023    PROTHROMBIN TIME + INR     Standing Status:   Future     Standing Expiration Date:   1/6/2023    TSH 3RD GENERATION     Standing Status:   Future     Standing Expiration Date:   1/6/2023    CBC W/O DIFF     Standing Status:   Future     Standing Expiration Date:   1/6/2023     Follow-up and Dispositions    · Return in about 1 month (around 2/5/2022).        current treatment plan is effective, no change in therapy  cardiovascular risk and specific lipid/LDL goals reviewed  use of aspirin to prevent MI and TIA's discussed. Patient is a 68-year-old male with history of diabetes, dyslipidemia seen for recurrent episodes of left-sided chest tightness occurring both at rest and exertion lasting few minutes. Also reports intermittent upper back discomfort. CCS 3. Reports exertional dyspnea at moderate activity. No orthopnea or PND. Underwent recent stress test which revealed normal SPECT imaging however found to have 2 mm ST segment depression in inferolateral leads at peak exercise. Given multiple cardiac risk factors, symptoms and abnormal EKG portion recommend coronary angiogram to further evaluate CAD. All questions answered. Patient willing to proceed however unsure about where he wants to do the procedure. He will discuss with his wife and call us back.

## 2022-01-05 NOTE — TELEPHONE ENCOUNTER
----- Message from Herminio Miguel sent at 1/5/2022 12:11 PM EST -----  Subject: Message to Provider    QUESTIONS  Information for Provider? [facility] called in and they would   like a call back about a referral for Laquita Swanson, to see if one is   needed for . please contact Lay at [phone] at the [facility]  ---------------------------------------------------------------------------  --------------  [address]  What is the best way for the office to contact you? OK to leave message on   voicemail  Preferred Call Back Phone Number?  [phone]  ---------------------------------------------------------------------------  --------------  SCRIPT ANSWERS  undefined

## 2022-01-10 ENCOUNTER — HOSPITAL ENCOUNTER (OUTPATIENT)
Age: 67
Discharge: HOME | End: 2022-01-10
Attending: INTERNAL MEDICINE | Admitting: INTERNAL MEDICINE
Payer: OTHER GOVERNMENT

## 2022-01-10 VITALS
WEIGHT: 210 LBS | BODY MASS INDEX: 28.44 KG/M2 | TEMPERATURE: 98.5 F | DIASTOLIC BLOOD PRESSURE: 81 MMHG | RESPIRATION RATE: 27 BRPM | OXYGEN SATURATION: 98 % | HEIGHT: 72 IN | SYSTOLIC BLOOD PRESSURE: 126 MMHG | HEART RATE: 76 BPM

## 2022-01-10 DIAGNOSIS — I20.8 STABLE ANGINA PECTORIS (HCC): ICD-10-CM

## 2022-01-10 PROBLEM — Z95.820 STATUS POST ANGIOPLASTY WITH STENT: Status: ACTIVE | Noted: 2022-01-10

## 2022-01-10 LAB
ACT BLD: 220 SECS (ref 79–138)
GLUCOSE BLD STRIP.AUTO-MCNC: 174 MG/DL (ref 70–110)

## 2022-01-10 PROCEDURE — 99152 MOD SED SAME PHYS/QHP 5/>YRS: CPT | Performed by: INTERNAL MEDICINE

## 2022-01-10 PROCEDURE — 74011000636 HC RX REV CODE- 636: Performed by: INTERNAL MEDICINE

## 2022-01-10 PROCEDURE — 77030013744: Performed by: INTERNAL MEDICINE

## 2022-01-10 PROCEDURE — 92928 PRQ TCAT PLMT NTRAC ST 1 LES: CPT | Performed by: INTERNAL MEDICINE

## 2022-01-10 PROCEDURE — 93458 L HRT ARTERY/VENTRICLE ANGIO: CPT | Performed by: INTERNAL MEDICINE

## 2022-01-10 PROCEDURE — C1769 GUIDE WIRE: HCPCS | Performed by: INTERNAL MEDICINE

## 2022-01-10 PROCEDURE — 74011250636 HC RX REV CODE- 250/636: Performed by: INTERNAL MEDICINE

## 2022-01-10 PROCEDURE — C1887 CATHETER, GUIDING: HCPCS | Performed by: INTERNAL MEDICINE

## 2022-01-10 PROCEDURE — G0378 HOSPITAL OBSERVATION PER HR: HCPCS

## 2022-01-10 PROCEDURE — 82962 GLUCOSE BLOOD TEST: CPT

## 2022-01-10 PROCEDURE — 74011250637 HC RX REV CODE- 250/637: Performed by: INTERNAL MEDICINE

## 2022-01-10 PROCEDURE — 85347 COAGULATION TIME ACTIVATED: CPT

## 2022-01-10 PROCEDURE — 74011000250 HC RX REV CODE- 250: Performed by: INTERNAL MEDICINE

## 2022-01-10 PROCEDURE — 77030013797 HC KT TRNSDUC PRSSR EDWD -A: Performed by: INTERNAL MEDICINE

## 2022-01-10 PROCEDURE — C1725 CATH, TRANSLUMIN NON-LASER: HCPCS | Performed by: INTERNAL MEDICINE

## 2022-01-10 PROCEDURE — 77030013519 HC DEV INFL BASIX MRTM -B: Performed by: INTERNAL MEDICINE

## 2022-01-10 PROCEDURE — 77030042317 HC BND COMPR HEMSTAT -B: Performed by: INTERNAL MEDICINE

## 2022-01-10 PROCEDURE — C1874 STENT, COATED/COV W/DEL SYS: HCPCS | Performed by: INTERNAL MEDICINE

## 2022-01-10 PROCEDURE — 77030015766: Performed by: INTERNAL MEDICINE

## 2022-01-10 PROCEDURE — C1894 INTRO/SHEATH, NON-LASER: HCPCS | Performed by: INTERNAL MEDICINE

## 2022-01-10 PROCEDURE — 99153 MOD SED SAME PHYS/QHP EA: CPT | Performed by: INTERNAL MEDICINE

## 2022-01-10 DEVICE — STENT RONYX27515UX RESOLUTE ONYX 2.75X15
Type: IMPLANTABLE DEVICE | Status: FUNCTIONAL
Brand: RESOLUTE ONYX™

## 2022-01-10 RX ORDER — SODIUM CHLORIDE 0.9 % (FLUSH) 0.9 %
5-40 SYRINGE (ML) INJECTION EVERY 8 HOURS
Status: DISCONTINUED | OUTPATIENT
Start: 2022-01-10 | End: 2022-01-10 | Stop reason: HOSPADM

## 2022-01-10 RX ORDER — GUAIFENESIN 100 MG/5ML
81 LIQUID (ML) ORAL DAILY
Status: DISCONTINUED | OUTPATIENT
Start: 2022-01-11 | End: 2022-01-10 | Stop reason: HOSPADM

## 2022-01-10 RX ORDER — HEPARIN SODIUM 200 [USP'U]/100ML
INJECTION, SOLUTION INTRAVENOUS
Status: COMPLETED | OUTPATIENT
Start: 2022-01-10 | End: 2022-01-10

## 2022-01-10 RX ORDER — FENTANYL CITRATE 50 UG/ML
INJECTION, SOLUTION INTRAMUSCULAR; INTRAVENOUS AS NEEDED
Status: DISCONTINUED | OUTPATIENT
Start: 2022-01-10 | End: 2022-01-10 | Stop reason: HOSPADM

## 2022-01-10 RX ORDER — METOPROLOL TARTRATE 25 MG/1
25 TABLET, FILM COATED ORAL 2 TIMES DAILY
Qty: 60 TABLET | Refills: 6 | Status: SHIPPED | OUTPATIENT
Start: 2022-01-10 | End: 2022-05-31 | Stop reason: SDUPTHER

## 2022-01-10 RX ORDER — SODIUM CHLORIDE 0.9 % (FLUSH) 0.9 %
5-40 SYRINGE (ML) INJECTION AS NEEDED
Status: DISCONTINUED | OUTPATIENT
Start: 2022-01-10 | End: 2022-01-10 | Stop reason: HOSPADM

## 2022-01-10 RX ORDER — LIDOCAINE HYDROCHLORIDE 10 MG/ML
INJECTION, SOLUTION EPIDURAL; INFILTRATION; INTRACAUDAL; PERINEURAL AS NEEDED
Status: DISCONTINUED | OUTPATIENT
Start: 2022-01-10 | End: 2022-01-10 | Stop reason: HOSPADM

## 2022-01-10 RX ORDER — SODIUM CHLORIDE 9 MG/ML
INJECTION, SOLUTION INTRAVENOUS
Status: COMPLETED | OUTPATIENT
Start: 2022-01-10 | End: 2022-01-10

## 2022-01-10 RX ORDER — HEPARIN SODIUM 1000 [USP'U]/ML
INJECTION, SOLUTION INTRAVENOUS; SUBCUTANEOUS AS NEEDED
Status: DISCONTINUED | OUTPATIENT
Start: 2022-01-10 | End: 2022-01-10 | Stop reason: HOSPADM

## 2022-01-10 RX ORDER — VERAPAMIL HYDROCHLORIDE 2.5 MG/ML
INJECTION, SOLUTION INTRAVENOUS AS NEEDED
Status: DISCONTINUED | OUTPATIENT
Start: 2022-01-10 | End: 2022-01-10 | Stop reason: HOSPADM

## 2022-01-10 RX ORDER — GUAIFENESIN 100 MG/5ML
81 LIQUID (ML) ORAL DAILY
COMMUNITY

## 2022-01-10 RX ORDER — MIDAZOLAM HYDROCHLORIDE 1 MG/ML
INJECTION, SOLUTION INTRAMUSCULAR; INTRAVENOUS AS NEEDED
Status: DISCONTINUED | OUTPATIENT
Start: 2022-01-10 | End: 2022-01-10 | Stop reason: HOSPADM

## 2022-01-10 NOTE — ROUTINE PROCESS
[id number] Cath holding summary     Patient escorted to cath holding from waiting area ambulatory, alert and oriented x 4, voicing no complaints. Changed into gown and placed on monitor. NPO since MN. Lab results, med rec and H&P reviewed on chart. PIV x 2 inserted without difficulty. Family to bedside. 0825 TRANSFER - OUT REPORT:    Verbal report given to Dayanna Liz (vy) on [facility]  being transferred to Cath Lab (unit) for ordered procedure       Report consisted of patients Situation, Background, Assessment and   Recommendations(SBAR). Information from the following report(s) SBAR, Kardex, Intake/Output, MAR, Recent Results and Pre Procedure Checklist was reviewed with the receiving nurse. Lines:   Peripheral IV 01/10/22 Anterior;Proximal;Right Forearm (Active)   Site Assessment Clean, dry, & intact 01/10/22 0751   Phlebitis Assessment 0 01/10/22 0751   Infiltration Assessment 0 01/10/22 0751   Dressing Status Clean, dry, & intact 01/10/22 0751   Dressing Type Transparent;Tape 01/10/22 0751   Hub Color/Line Status Pink;Flushed;Patent 01/10/22 0751   Action Taken Dressing reinforced 01/10/22 0751   Alcohol Cap Used Yes 01/10/22 0751       Peripheral IV 01/10/22 Anterior;Left;Proximal Forearm (Active)   Site Assessment Clean, dry, & intact 01/10/22 0751   Phlebitis Assessment 0 01/10/22 0751   Infiltration Assessment 0 01/10/22 0751   Dressing Status Clean, dry, & intact 01/10/22 0751   Dressing Type Transparent;Tape 01/10/22 0751   Hub Color/Line Status Pink;Flushed;Patent 01/10/22 0751   Action Taken Dressing reinforced 01/10/22 0751   Alcohol Cap Used Yes 01/10/22 0751        Opportunity for questions and clarification was provided.       Patient transported with:   Tech    0935 TRANSFER - IN REPORT:    Verbal [facility] from [facility] (name[facility] from Cath Lab (unit) for routine post - op      Report consisted of patients Situation, Background, Assessment and Recommendations(SBAR). Information from the following report(s) SBAR, Kardex, Procedure Summary, Intake/Output, MAR and Recent Results was reviewed with the receiving nurse. Opportunity for questions and clarification was provided. Assessment completed upon patients arrival to unit and care assumed. 1142 Vascular Band removed from R wrist. No bleeding or hematoma noted. Evern Reagin and tegaderm in place. Dressing is clean, dry, and intact. Patient has no complaint of pain at this time. Bedrest instructions discussed with patient, patient verbalized understanding. 1145 Patient given filled Brilinta prescription and education was provided and was told to start first dose this evening. 1530 Pre-Discharge Checklist for Same Day Discharge Post PCI      1. Confirm that loading dose of P2Y12i has been administered. YES    2. Confirm the patient has received prescriptions for at least 30 days of P2Y12i. YES - filled at outpatient pharmacy. 3. Confirm prescription for aspirin and statin. YES. 4. Confirm referral to cardiac rehab. YES    5. Charge nurse plans on calling patient the day after discharge. YES    6. The cath holding nurse has provided education to patient on how to monitor access site, and the importance of taking DAPT as prescribed and the specific risks of premature discontinuation. YES    7. The cath holding nurse has provided the patient with an emergency number to call. YES    8. The cath holding nurse has scheduled a follow up appointment. YES - instructions were given for patient to schedule appointment for a 2 week follow-up. 1600 AVS Discharge instructions reviewed with patient and copy given to patient. All questions answered. Patient verbalized understanding to all discharge instructions. PIV removed. Procedural site within normal limits. No hematoma or bleeding noted from procedural and PIV site. No pain noted at discharge.   Patient discharged with support person in stable condition. Escorted out to vehicle for transport home.

## 2022-01-10 NOTE — Clinical Note
TRANSFER - IN REPORT:     Verbal report received from: Carol Morin. Report consisted of patient's Situation, Background, Assessment and   Recommendations(SBAR). Opportunity for questions and clarification was provided. Assessment completed upon patient's arrival to unit and care assumed. Patient transported with a Cardiac Cath Tech / Patient Care Tech.

## 2022-01-10 NOTE — Clinical Note
TRANSFER - OUT REPORT:     Verbal report given to: Low Arzola. Report consisted of patient's Situation, Background, Assessment and   Recommendations(SBAR). Opportunity for questions and clarification was provided. Patient transported with a Cardiac Cath Tech / Patient Care Tech. Patient transported to: holding area.

## 2022-01-10 NOTE — Clinical Note
Contrast Dose Calculator:   Patient's age: 61.   Patient's sex: Male. Patient weight (kg) = 95.3. Creatinine level (mg/dL) = 1.15. Creatinine clearance (mL/min): 87.   Contrast concentration (mg/mL) = 300. MACD = 300 mL. Max Contrast dose per Creatinine Cl calculator = 195.75 mL.

## 2022-01-10 NOTE — DISCHARGE INSTRUCTIONS
HEART CATHETERIZATION/ANGIOGRAPHY DISCHARGE INSTRUCTIONS    1. Check puncture site frequently for swelling or bleeding. If there is any bleeding, lie down and apply pressure over the area with a clean towel or washcloth. Notify your doctor for any redness, swelling, drainage, or oozing from the puncture site. Notify your doctor for any fever or chills. 2. If the extremity becomes cold, numb, or painful go to the Emergency Room. 3. Activity should be limited for the next 48 hours. Climb stairs as little as possible and avoid any stooping, bending, or strenuous activity for 48 hours. No heavy lifting (anything over 8 pounds) for 5 days. 4. You may resume your usual diet. Drink more fluids than usual.  5. Have a responsible person drive you home and stay with you for at least 24 hours after your heart catheterization/angiography. 6. You may remove bandage from your Right Arm in 24 hours. You may shower in 24 hours. No tub baths, hot tubs, or swimming for 1 week. Do not place any lotions, creams, powders, or ointments over puncture site for 1 week. You may place a clean band-aid over the puncture site each day for 5 days. Change daily. 7. If you take Metformin, do not take it for 48 hours. 8. Ask your nurse when to restart any blood thinners. How can you care for yourself at home? Activity  · Do not do strenuous exercise and do not lift, pull, or push anything heavy until your doctor says it is okay. This may be for a day or two. You can walk around the house and do light activity, such as cooking. · You may shower 24 to 48 hours after the procedure, if your doctor okays it. Pat the incision dry. Do not take a bath for 1 week, or until your doctor tells you it is okay. · If the catheter was placed in your groin, try not to walk up stairs for the first couple of days. · If the catheter was placed in your arm near your wrist, do not bend your wrist deeply for the first couple of days.  Be careful using your hand to get into and out of a chair or bed. · If your doctor recommends it, get more exercise. Walking is a good choice. Bit by bit, increase the amount you walk every day. Try for at least 30 minutes on most days of the week. Diet  · Drink plenty of fluids to help your body flush out the dye. If you have kidney, heart, or liver disease and have to limit fluids, talk with your doctor before you increase the amount of fluids you drink. · Keep eating a heart-healthy diet that has lots of fruits, vegetables, and whole grains. If you have not been eating this way, talk to your doctor. You also may want to talk to a dietitian. This expert can help you to learn about healthy foods and plan meals. Medicines  · Your doctor will tell you if and when you can restart your medicines. He or she will also give you instructions about taking any new medicines. · If you take blood thinners, such as warfarin (Coumadin), clopidogrel (Plavix), or aspirin, be sure to talk to your doctor. He or she will tell you if and when to start taking those medicines again. Make sure that you understand exactly what your doctor wants you to do. · Your doctor may prescribe a blood-thinning medicine like aspirin or clopidogrel (Plavix). It is very important that you take these medicines exactly as directed in order to keep the coronary artery open and reduce your risk of a heart attack. Be safe with medicines. Call your doctor if you think you are having a problem with your medicine. Care of the catheter site  · For the first 3 days, keep a bandage over the spot where the catheter was inserted. · Put ice or a cold pack on the area for 10 to 20 minutes at a time to help with soreness or swelling. Put a thin cloth between the ice and your skin. Sedation for a Medical Procedure: Care Instructions  Your Care Instructions  For a minor procedure or surgery, you will get a sedative to help you relax. This drug will make you sleepy.  It is usually given in a vein (by IV). A shot may also be used to numb the area. If you had local anesthesia, you may feel some pain and discomfort as it wears off. If you have pain, don't be afraid to say so. Pain medicine works better if you take it before the pain gets bad. Common side effects from sedation include:  · Feeling sleepy. (Your doctors and nurses will make sure you are not too sleepy to go home.)  · Nausea and vomiting. This usually does not last long. · Feeling tired. Follow-up care is a key part of your treatment and safety. Be sure to make and go to all appointments, and call your doctor if you are having problems. It's also a good idea to know your test results and keep a list of the medicines you take. How can you care for yourself at home? Activity  · Don't do anything for 24 hours that requires attention to detail. It takes time for the medicine effects to completely wear off. · For your safety, you should not drive or operate any machinery that could be dangerous until the medicine wears off and you can think clearly and react easily. · Rest when you feel tired. Getting enough sleep will help you recover. Diet  · You can eat your normal diet, unless your doctor gives you other instructions. If your stomach is upset, try clear liquids and bland, low-fat foods like plain toast or rice. · Drink plenty of fluids (unless your doctor tells you not to). · Don't drink alcohol for 24 hours. Medicines  · Be safe with medicines. Read and follow all instructions on the label. ¨ If the doctor gave you a prescription medicine for pain, take it as prescribed. ¨ If you are not taking a prescription pain medicine, ask your doctor if you can take an over-the-counter medicine. · If you think your pain medicine is making you sick to your stomach:  ¨ Take your medicine after meals (unless your doctor has told you not to). ¨ Ask your doctor for a different pain medicine.     Follow-up care is a key part of your treatment and safety. Be sure to make and go to all appointments, and call your doctor if you are having problems. It's also a good idea to know your test results and keep a list of the medicines you take. When should you call for help? Call 911 anytime you think you may need emergency care. For example, call if:  · You passed out (lost consciousness). · You have severe trouble breathing. · You have sudden chest pain and shortness of breath, or you cough up blood. · You have symptoms of a heart attack. These may include:  ¨ Chest pain or pressure, or a strange feeling in the chest.  ¨ Sweating. ¨ Shortness of breath. ¨ Nausea or vomiting. ¨ Pain, pressure, or a strange feeling in the back, neck, jaw, or upper belly, or in one or both shoulders or arms. ¨ Lightheadedness or sudden weakness. ¨ A fast or irregular heartbeat. After you call 911, the  may tel you to chew 1 adult-strength or 2 to 4 low-dose aspirin. Wait for an ambulance. Do not try to drive yourself. · You have been diagnosed with angina, and you have symptoms that do not go away with rest or are not getting better within 5 minutes after you take a dose of nitroglycerin. Call your doctor now or seek immediate medical care if:  · You are bleeding from the area where the catheter was put in your artery. · You have a fast-growing, painful lump at the catheter site. · You have signs of infection, such as:  ¨ Increased pain, swelling, warmth, or redness. ¨ Red streaks leading from the catheter site. ¨ Pus draining from the catheter site. ¨ A fever. · Your leg or arm looks blue or feels cold, numb, or tingly. These are general instructions for a healthy lifestyle:    No smoking/ No tobacco products/ Avoid exposure to second hand smoke    Surgeon General's Warning:  Quitting smoking now greatly reduces serious risk to your health.     Obesity, smoking, and sedentary lifestyle greatly increases your risk for illness    A healthy diet, regular physical exercise & weight monitoring are important for maintaining a healthy lifestyle    You may be retaining fluid if you have a history of heart failure or if you experience any of the following symptoms:  Weight gain of 3 pounds or more overnight or 5 pounds in a week, increased swelling in our hands or feet or shortness of breath while lying flat in bed. Please call your doctor as soon as you notice any of these symptoms; do not wait until your next office visit. Recognize signs and symptoms of STROKE:    F-face looks uneven    A-arms unable to move or move unevenly    S-speech slurred or non-existent    T-time-call 911 as soon as signs and symptoms begin-DO NOT go       Back to bed or wait to see if you get better-TIME IS BRAIN. Warning Signs of HEART ATTACK     Call 911 if you have these symptoms:   Chest discomfort. Most heart attacks involve discomfort in the center of the chest that lasts more than a few minutes, or that goes away and comes back. It can feel like uncomfortable pressure, squeezing, fullness, or pain.  Discomfort in other areas of the upper body. Symptoms can include pain or discomfort in one or both arms, the back, neck, jaw, or stomach.  Shortness of breath with or without chest discomfort.  Other signs may include breaking out in a cold sweat, nausea, or lightheadedness. Don't wait more than five minutes to call 911 - MINUTES MATTER! Fast action can save your life. Calling 911 is almost always the fastest way to get lifesaving treatment. Emergency Medical Services staff can begin treatment when they arrive -- up to an hour sooner than if someone gets to the hospital by car.      DISCHARGE SUMMARY from Nurse    PATIENT INSTRUCTIONS:    After general anesthesia or intravenous sedation, for 24 hours or while taking prescription Narcotics:  · Limit your activities  · Do not drive and operate hazardous machinery  · Do not make important personal or business decisions  · Do  not drink alcoholic beverages  · If you have not urinated within 8 hours after discharge, please contact your surgeon on call. Report the following to your surgeon:  · Excessive pain, swelling, redness or odor of or around the surgical area  · Temperature over 100.5  · Nausea and vomiting lasting longer than 4 hours or if unable to take medications  · Any signs of decreased circulation or nerve impairment to extremity: change in color, persistent  numbness, tingling, coldness or increase pain  · Any questions    What to do at Home:  Recommended activity: Activity as tolerated and no driving for today. If you experience any of the following symptoms pain not relieved by over the counter pain medication, please follow up with Dr. Melissa Hu office at [phone]. *  Please give a list of your current medications to your Primary Care Provider. *  Please update this list whenever your medications are discontinued, doses are      changed, or new medications (including over-the-counter products) are added. *  Please carry medication information at all times in case of emergency situations. These are general instructions for a healthy lifestyle:    No smoking/ No tobacco products/ Avoid exposure to second hand smoke  Surgeon General's Warning:  Quitting smoking now greatly reduces serious risk to your health. Obesity, smoking, and sedentary lifestyle greatly increases your risk for illness    A healthy diet, regular physical exercise & weight monitoring are important for maintaining a healthy lifestyle    You may be retaining fluid if you have a history of heart failure or if you experience any of the following symptoms:  Weight gain of 3 pounds or more overnight or 5 pounds in a week, increased swelling in our hands or feet or shortness of breath while lying flat in bed.   Please call your doctor as soon as you notice any of these symptoms; do not wait until your next office visit.        The discharge information has been reviewed with the patient. The patient verbalized understanding. Discharge medications reviewed with the patient and appropriate educational materials and side effects teaching were provided.   ___________________________________________________________________________________________________________________________________

## 2022-01-12 ENCOUNTER — TELEPHONE (OUTPATIENT)
Dept: CARDIOLOGY CLINIC | Age: 67
End: 2022-01-12

## 2022-01-12 NOTE — TELEPHONE ENCOUNTER
Gout and medication issues, had cath on this past Monday. Have multiple questions and concerns, they had stopped the gout medication, have pain in right foot only. Can he start back on gout meds? Please advise.

## 2022-01-13 NOTE — TELEPHONE ENCOUNTER
Please advise if patient can restart taking his gout medication. Patient wants you to call him please at [phone].

## 2022-01-21 NOTE — TELEPHONE ENCOUNTER
Spoke with patient per Dr. Unruly Owens, can resume gout meds. He voices understanding and acceptance of this advice and will call back if any further questions or concerns.

## 2022-01-26 ENCOUNTER — OFFICE VISIT (OUTPATIENT)
Dept: CARDIOLOGY CLINIC | Age: 67
End: 2022-01-26
Payer: OTHER GOVERNMENT

## 2022-01-26 VITALS
OXYGEN SATURATION: 98 % | HEART RATE: 73 BPM | DIASTOLIC BLOOD PRESSURE: 66 MMHG | HEIGHT: 72 IN | WEIGHT: 212 LBS | BODY MASS INDEX: 28.71 KG/M2 | SYSTOLIC BLOOD PRESSURE: 126 MMHG

## 2022-01-26 DIAGNOSIS — E11.9 TYPE 2 DIABETES MELLITUS WITHOUT COMPLICATION, UNSPECIFIED WHETHER LONG TERM INSULIN USE (HCC): ICD-10-CM

## 2022-01-26 DIAGNOSIS — I25.10 CORONARY ARTERY DISEASE INVOLVING NATIVE CORONARY ARTERY OF NATIVE HEART WITHOUT ANGINA PECTORIS: Primary | ICD-10-CM

## 2022-01-26 DIAGNOSIS — Z95.5 STATUS POST INSERTION OF DRUG ELUTING CORONARY ARTERY STENT: ICD-10-CM

## 2022-01-26 DIAGNOSIS — E78.5 DYSLIPIDEMIA: ICD-10-CM

## 2022-01-26 PROCEDURE — 99214 OFFICE O/P EST MOD 30 MIN: CPT | Performed by: INTERNAL MEDICINE

## 2022-01-26 NOTE — PROGRESS NOTES
1. Have you been to the ER, urgent care clinic since your last visit? Hospitalized since your last visit? No    2. Have you seen or consulted any other health care providers outside of the [address] [place] [address] since your last visit? Include any pap smears or colon screening.  No

## 2022-01-26 NOTE — PROGRESS NOTES
HISTORY OF PRESENT ILLNESS  Kermitt Cousins is a 61 y.o. male. Chest Pain (Angina)   The history is provided by the patient. This is a recurrent problem. The problem has been resolved. Pertinent negatives include no diaphoresis, no dizziness, no nausea, no palpitations and no weakness. Risk factors include diabetes mellitus, male gender and dyslipidemia. Procedural history includes cardiac catheterization and cardiac stents. Review of Systems   Constitutional: Negative for chills, diaphoresis and weight loss. HENT: Negative for hearing loss. Eyes: Negative for blurred vision. Respiratory: Negative for stridor. Cardiovascular: Positive for chest pain. Negative for palpitations. Gastrointestinal: Negative for heartburn and nausea. Musculoskeletal: Negative for myalgias. Neurological: Negative for dizziness, tingling, tremors, focal weakness, loss of consciousness and weakness. Psychiatric/Behavioral: Negative for depression and suicidal ideas.      Family History   Problem Relation Age of Onset    Diabetes Mother     Dementia Maternal Grandmother    Collette Acosta Brother        Past Medical History:   Diagnosis Date    BPH (benign prostatic hyperplasia) 12/9/2009    DDD (degenerative disc disease), lumbar 12/9/2009    Diverticulosis 12/9/2009    DM (diabetes mellitus) ([facility] [address] 12/9/2009    History of colon polyps 4/5/2016    Hypercholesterolemia     Pseudogout 12/9/2009    Renal cell carcinoma of left kidney ([address] 6/19/15    Pathological Stage [id number] pap RCC FG2 s/p left open partial nephrectomy in 6/15     Right shoulder pain 12/9/2009       Past Surgical History:   Procedure Laterality Date    ENDOSCOPY, COLON, DIAGNOSTIC  2000    sigmoidectomy    HX NEPHRECTOMY Left 6/19/15    Partial, Dr. Brooke [facility], [facility]    HX ORTHOPAEDIC  2005    left rotator cuff    HX OTHER SURGICAL  2007    hydrocele    HX TONSILLECTOMY  1978       Social History     Tobacco Use    Smoking status: Former Smoker Packs/day: 1.00     Years: 40.00     Pack years: 40.00     Types: Cigarettes     Quit date: 2010     Years since quittin.9    Smokeless tobacco: Never Used   Substance Use Topics    Alcohol use: Yes     Comment: occ       No Known Allergies    Outpatient Medications Marked as Taking for the 22 encounter (Office Visit) with Dany Ayers MD   Medication Sig Dispense Refill    aspirin 81 mg chewable tablet Take 81 mg by mouth daily.  ticagrelor (BRILINTA) 90 mg tablet Take 1 Tablet by mouth two (2) times a day. 60 Tablet 0    metoprolol tartrate (LOPRESSOR) 25 mg tablet Take 1 Tablet by mouth two (2) times a day. 60 Tablet 6    simvastatin (ZOCOR) 20 mg tablet TAKE 1 TABLET NIGHTLY 90 Tablet 3    metFORMIN (GLUCOPHAGE) 500 mg tablet TAKE 2 TABLETS IN THE MORNING AND 2 TABLETS IN THE EVENING 360 Tablet 3    lisinopriL (PRINIVIL, ZESTRIL) 5 mg tablet TAKE 1 TABLET DAILY 90 Tablet 3    Jardiance 10 mg tablet TAKE 1 TABLET DAILY 90 Tablet 3    lancets (FreeStyle Lancets) 28 gauge misc USE TO CHECK BLOOD SUGAR DAILY 100 Lancet 4    SITagliptin (Januvia) 100 mg tablet TAKE 1 TABLET DAILY 90 Tablet 3    glucose blood VI test strips (FREESTYLE LITE STRIPS) strip Blood sugar check daily. 100 Strip 3        Visit Vitals  /66 (BP 1 Location: Left upper arm, BP Patient Position: Sitting, BP Cuff Size: Adult)   Pulse 73   Ht 6' (1.829 m)   Wt 96.2 kg (212 lb)   SpO2 98%   BMI 28.75 kg/m²       Physical Exam  Constitutional:       Appearance: He is well-developed. HENT:      Head: Normocephalic and atraumatic. Eyes:      General: No scleral icterus. Conjunctiva/sclera: Conjunctivae normal.   Neck:      Vascular: No JVD. Cardiovascular:      Rate and Rhythm: Normal rate and regular rhythm. Heart sounds: Normal heart sounds. No murmur heard. No friction rub. No gallop. Pulmonary:      Effort: Pulmonary effort is normal. No respiratory distress.       Breath sounds: Normal breath sounds. No stridor. No wheezing or rales. Chest:      Chest wall: No tenderness. Abdominal:      General: There is no distension. Tenderness: There is no abdominal tenderness. Musculoskeletal:         General: No tenderness. Normal range of motion. Cervical back: Normal range of motion and neck supple. Lymphadenopathy:      Cervical: No cervical adenopathy. Skin:     Findings: No erythema or rash. Neurological:      Mental Status: He is alert and oriented to person, place, and time. Cranial Nerves: No cranial nerve deficit. Psychiatric:         Behavior: Behavior normal.     ekg sinus rhythm with no acute st-t changes    01/03/22    NUCLEAR CARDIAC STRESS TEST 01/03/2022 1/4/2022    Interpretation Summary    Nuclear Findings: LVEF measures 54%. TID ratio is 0.99.    Nuclear Findings: There is a mild severity left ventricular stress perfusion defect. There is normal wall motion in the defect area. The defect appears to be an artifact caused by subdiaphragmatic activity. Perfusion defect was visually and quantitatively present.   Nuclear Findings: The study is most consistent with artifact but cannot rule out a small degree of myocardial ischemia. Findings suggest a low risk of myocardial ischemia.   ECG: Resting ECG demonstrates normal sinus rhythm.   Stress Test: A Amaury protocol stress test was performed. EKG portion revealed 2 mm st segment depression in inferolateral leads  Recommend clinical correlation. Signed by: Breanne Hunter MD on 1/3/2022  7:10 PM  01/10/22    CARDIAC PROCEDURE 01/10/2022 1/10/2022    Conclusion  Critical branch vessel stenosis ( mid D1 90-95%) with preserved LV function. S/p ptca/stent ( TOBY). Lesion reduced to 0%. Continue DAPT/ intense risk factor modification. Signed by: Breanne Hunter MD on 1/10/2022  9:57 AM      ASSESSMENT and PLAN    ICD-10-CM ICD-9-CM    1.  Coronary artery disease involving native coronary artery of native heart without angina pectoris  I25.10 414.01    2. Dyslipidemia  E78.5 272.4    3. Type 2 diabetes mellitus without complication, unspecified whether long term insulin use (HCC)  E11.9 250.00    4. Status post insertion of drug eluting coronary artery stent  Z95.5 V45.82      No orders of the defined types were placed in this encounter. Follow-up and Dispositions    · Return in about 4 months (around 5/26/2022). current treatment plan is effective, no change in therapy  cardiovascular risk and specific lipid/LDL goals reviewed  use of aspirin to prevent MI and TIA's discussed. Patient is a 57-year-old male with history of diabetes, dyslipidemia seen for recurrent episodes of left-sided chest tightness occurring both at rest and exertion lasting few minutes. Also reports intermittent upper back discomfort. CCS 3. Reports exertional dyspnea at moderate activity. No orthopnea or PND. Underwent recent stress test which revealed normal SPECT imaging however found to have 2 mm ST segment depression in inferolateral leads at peak exercise. Subsequently underwent coronary angiogram which revealed high-grade stenosis in diagonal 1 artery. Status post PCI using drug-eluting stent. Seen for follow-up. Right radial artery intact with no ecchymosis or hematoma. Chest pain has resolved. Currently on dual antiplatelet. Advised to decrease the metoprolol dose to 12.5 mg twice daily. LDL on target. Continue current medications and follow-up in 4 months.

## 2022-01-27 ENCOUNTER — OFFICE VISIT (OUTPATIENT)
Dept: FAMILY MEDICINE CLINIC | Age: 67
End: 2022-01-27
Payer: OTHER GOVERNMENT

## 2022-01-27 VITALS
RESPIRATION RATE: 16 BRPM | HEART RATE: 59 BPM | DIASTOLIC BLOOD PRESSURE: 74 MMHG | BODY MASS INDEX: 28.39 KG/M2 | WEIGHT: 209.6 LBS | HEIGHT: 72 IN | SYSTOLIC BLOOD PRESSURE: 114 MMHG | OXYGEN SATURATION: 97 % | TEMPERATURE: 97.3 F

## 2022-01-27 DIAGNOSIS — M1A.00X0 IDIOPATHIC CHRONIC GOUT WITHOUT TOPHUS, UNSPECIFIED SITE: ICD-10-CM

## 2022-01-27 DIAGNOSIS — F41.9 ANXIETY: ICD-10-CM

## 2022-01-27 DIAGNOSIS — I25.10 CORONARY ARTERY DISEASE DUE TO CALCIFIED CORONARY LESION: Primary | ICD-10-CM

## 2022-01-27 DIAGNOSIS — I25.84 CORONARY ARTERY DISEASE DUE TO CALCIFIED CORONARY LESION: Primary | ICD-10-CM

## 2022-01-27 PROCEDURE — 99214 OFFICE O/P EST MOD 30 MIN: CPT | Performed by: FAMILY MEDICINE

## 2022-01-27 RX ORDER — NYSTATIN 100000 [USP'U]/G
POWDER TOPICAL 4 TIMES DAILY
Qty: 1 EACH | Refills: 1 | Status: SHIPPED | OUTPATIENT
Start: 2022-01-27

## 2022-01-27 RX ORDER — COLCHICINE 0.6 MG/1
0.6 TABLET ORAL DAILY
COMMUNITY
End: 2022-01-27 | Stop reason: SDUPTHER

## 2022-01-27 RX ORDER — NYSTATIN 100000 [USP'U]/G
POWDER TOPICAL 4 TIMES DAILY
COMMUNITY
End: 2022-01-27 | Stop reason: SDUPTHER

## 2022-01-27 RX ORDER — DIAZEPAM 5 MG/1
5 TABLET ORAL
Qty: 30 TABLET | Refills: 1 | Status: SHIPPED | OUTPATIENT
Start: 2022-01-27

## 2022-01-27 RX ORDER — COLCHICINE 0.6 MG/1
TABLET ORAL
Qty: 30 TABLET | Refills: 1 | Status: SHIPPED | OUTPATIENT
Start: 2022-01-27

## 2022-01-27 RX ORDER — DIAZEPAM 5 MG/1
5 TABLET ORAL
COMMUNITY
End: 2022-01-27 | Stop reason: SDUPTHER

## 2022-01-27 NOTE — PROGRESS NOTES
1. \"Have you been to the ER, urgent care clinic since your last visit? Hospitalized since your last visit? \" No    2. \"Have you seen or consulted any other health care providers outside of the [address] [place] [address] since your last visit? \" No     3. For patients aged 39-70: Has the patient had a colonoscopy / FIT/ Cologuard?  Yes - no Care Gap present

## 2022-01-27 NOTE — PROGRESS NOTES
HPI:  Jacques Esparza is a 61 y.o. male who presents today with   Chief Complaint   Patient presents with    Chest Pain (Angina)     post heart cath         Patient is here to follow-up after having had an outpatient stress test which revealed evidence of a reversible defect. He was subsequently referred to cardiology. He underwent a cardiac catheterization and during catheterization and a stent was placed due to discovered coronary artery disease. Patient tolerated. And was subsequently placed on Brilinta. He also continues to take aspirin. He is on metoprolol as well. After the cath patient did develop a gout attack. He was in need of colchicine. This has been approved by cardiology to resume. Additionally patient needs a refill on Mycostatin powder. Patient otherwise feels well. He no longer has the chest pain he had previously. His gout has also improved. He has no new complaints today. Patient does need a refill on his Valium. He takes this for anxiety. 3 most recent PHQ Screens 1/27/2022   PHQ Not Done Patient refuses   Little interest or pleasure in doing things Not at all   Feeling down, depressed, irritable, or hopeless Not at all   Total Score PHQ 2 0               PMH,  Meds, Allergies, Family History, Social history reviewed      Current Outpatient Medications   Medication Sig Dispense Refill    colchicine 0.6 mg tablet Take 2 tabs PO X 1 ; may repeat 1 tab PO in one hour X1 prn continued gout. 30 Tablet 1    diazePAM (Valium) 5 mg tablet Take 1 Tablet by mouth every twelve (12) hours as needed for Anxiety. Max Daily Amount: 10 mg. 30 Tablet 1    nystatin (MYCOSTATIN) powder Apply  to affected area four (4) times daily. 1 Each 1    aspirin 81 mg chewable tablet Take 81 mg by mouth daily.  ticagrelor (BRILINTA) 90 mg tablet Take 1 Tablet by mouth two (2) times a day. 60 Tablet 0    metoprolol tartrate (LOPRESSOR) 25 mg tablet Take 1 Tablet by mouth two (2) times a day. 60 Tablet 6    simvastatin (ZOCOR) 20 mg tablet TAKE 1 TABLET NIGHTLY 90 Tablet 3    metFORMIN (GLUCOPHAGE) 500 mg tablet TAKE 2 TABLETS IN THE MORNING AND 2 TABLETS IN THE EVENING 360 Tablet 3    lisinopriL (PRINIVIL, ZESTRIL) 5 mg tablet TAKE 1 TABLET DAILY 90 Tablet 3    Jardiance 10 mg tablet TAKE 1 TABLET DAILY 90 Tablet 3    lancets (FreeStyle Lancets) 28 gauge misc USE TO CHECK BLOOD SUGAR DAILY 100 Lancet 4    SITagliptin (Januvia) 100 mg tablet TAKE 1 TABLET DAILY 90 Tablet 3    glucose blood VI test strips (FREESTYLE LITE STRIPS) strip Blood sugar check daily. 100 Strip 3        No Known Allergies               ROS as per HPI      Visit Vitals  /74 (BP 1 Location: Right arm, BP Patient Position: Sitting, BP Cuff Size: Large adult)   Pulse (!) 59   Temp 97.3 °F (36.3 °C) (Temporal)   Resp 16   Ht 6' (1.829 m)   Wt 209 lb 9.6 oz (95.1 kg)   SpO2 97%   BMI 28.43 kg/m²     Physical Exam   General appearance: alert, cooperative, no distress, appears stated age    Lungs: clear to auscultation bilaterally  Heart: regular rate and rhythm, S1, S2 normal, no murmur, click, rub or gallop  Extremities: extremities normal, atraumatic, no cyanosis or edema      Assessment/Plan:    Diagnoses and all orders for this visit:    1. Coronary artery disease due to calcified coronary lesion    2. Anxiety  -     diazePAM (Valium) 5 mg tablet; Take 1 Tablet by mouth every twelve (12) hours as needed for Anxiety. Max Daily Amount: 10 mg.    3. Idiopathic chronic gout without tophus, unspecified site    Other orders  -     colchicine 0.6 mg tablet; Take 2 tabs PO X 1 ; may repeat 1 tab PO in one hour X1 prn continued gout. -     nystatin (MYCOSTATIN) powder; Apply  to affected area four (4) times daily.       As above  Patient currently stable  Follow-up with cardiology as recommended  Refilled colchicine for Mycostatin powder  Valium also refilled  Patient has been given a copy of his lab orders to be used prior to his next visit  Follow-up and Dispositions    · Return in about 4 months (around 5/27/2022). This has been fully explained to the patient, who indicates understanding. An After Visit Summary was printed and given to the patient. Follow-up and Dispositions    · Return in about 4 months (around 5/27/2022).             Richy Alcaraz MD

## 2022-01-27 NOTE — PATIENT INSTRUCTIONS
Learning About Coronary Artery Disease (CAD)  What is coronary artery disease? Coronary artery disease is a condition that occurs when plaque builds up in the arteries that bring oxygen-rich blood to your heart. Plaque is a fatty substance made of cholesterol, calcium, and other substances in the blood. This process is called hardening of the arteries, or atherosclerosis. What happens when you have coronary artery disease? · Plaque may narrow the coronary arteries. Narrowed arteries cause poor blood flow. This can lead to angina symptoms such as chest pain or discomfort. If blood flow is completely blocked, you could have a heart attack. · You can slow and reduce the risk of future problems by making changes in your lifestyle. These include quitting smoking and eating heart-healthy foods. · Treatment, along with changes in your lifestyle, can help you live a longer and healthier life. How can you prevent coronary artery disease? · Do not smoke. It may be the best thing you can do to prevent coronary artery disease. If you need help quitting, talk to your doctor about stop-smoking programs and medicines. These can increase your chances of quitting for good. · Be active. Try to do moderate activity at least 2½ hours a week. Or try to do vigorous activity at least 1¼ hours a week. You may want to walk or try other activities, such as running, swimming, cycling, or playing tennis or team sports. · Eat heart-healthy foods. Eat more fruits and vegetables and less food that contains saturated and trans fats. Limit alcohol, sodium, and sweets. · Stay at a healthy weight. Lose weight if you need to. · Manage other health problems such as diabetes, high blood pressure, and high cholesterol. How is coronary artery disease treated? · Your doctor will suggest that you make lifestyle changes. For example, your doctor may ask you to eat healthy foods, quit smoking, lose extra weight, and be more active.   · You will take medicines that help prevent a heart attack. · Your doctor may suggest a procedure to open narrowed or blocked arteries. This is called angioplasty. Or your doctor may suggest using healthy blood vessels to create detours around narrowed or blocked arteries. This is called bypass surgery. Follow-up care is a key part of your treatment and safety. Be sure to make and go to all appointments, and call your doctor if you are having problems. It's also a good idea to know your test results and keep a list of the medicines you take. Where can you learn more? Go to http://www.gray.com/  Enter C643 in the search box to learn more about \"Learning About Coronary Artery Disease (CAD). \"  Current as of: April 29, 2021               Content Version: 13.0  © 2006-2021 [employer]. Care instructions adapted under license by [employer] (which disclaims liability or warranty for this information). If you have questions about a medical condition or this instruction, always ask your healthcare professional. [address] any warranty or liability for your use of this information.

## 2022-02-08 ENCOUNTER — HOSPITAL ENCOUNTER (OUTPATIENT)
Dept: CARDIAC REHAB | Age: 67
Discharge: HOME | End: 2022-02-08
Payer: MEDICARE

## 2022-02-08 VITALS — WEIGHT: 208 LBS | BODY MASS INDEX: 28.21 KG/M2

## 2022-02-08 PROCEDURE — 93798 PHYS/QHP OP CAR RHAB W/ECG: CPT

## 2022-02-08 NOTE — PROGRESS NOTES
CARDIAC REHAB INITIAL ITP FOR REVIEW AND SIGNATURE    Dane Coulter 74 y.o. presented to cardiac rehab for an intake and a six minute walk test today with a primary diagnosis of CAD. Patient's EF is 54%. Dane Coulter has a history of Hyperlipidemia, Diabetes Mellitus, Coronary artery disease and status post coronary artery stenting. Cardiac risk factors include smoking/ tobacco exposure, family history, dyslipidemia, diabetes mellitus and these were reviewed with patient. Dane Coulter is  and lives with lives with their spouse. PHQ-9, depression score, is 1 and this is considered to be low. The result was discussed with patient who confirms score to be accurate and if above a 5, a copy of the results were sent to patient's PCP. Patient denied chest pain or SOB during 6 minute walk and the cardiac rhythm was in Normal Sinus Rhythm. Dane Coulter will attend exercise and educational sessions 2 days a week in cardiac rehab for 36 sessions. Goals for Rehab:    Patient name: Dane Coulter : 1955         Goals Comments   1. Increase endurance   [x] initial  [] met                  [] not met  [] progressing Increase peak METS on the treadmill from 3.0 to 3.5 by next recert.      Meche Macias RN 2022 3:33 PM    Cardiac ITP     CR INTAKE from 2022 in [place] [id number]   Treatment Diagnosis    Treatment Diagnosis 1 --  [CAD]   Treatment Diagnosis 2 PCI   PCI Date 01/10/22   Referral Date 22   Co-morbidities Diabetes   Individual Treatment Plan    ITP Visit Type Initial Assessment   1st Date of Exercise  22   ITP Next Review Date 22   Visit #/Total Visits    EF % --  [no recent echo performed]   Risk Stratification Low   ITP Exercise, Psychosocial, Tobacco, Nutrition, Education   Exercise     Stages of Change Preparation   DASI Total Score 50.2   Assisted Devices None   Total Score 1   Safety Level I --   Test Six minute walk test   Exercise Prescription    Mode Treadmill, Bike, Stepper, Ergometer   Frequency per week 2-3   Duration per session 35-45   Intensity  METS       2.6-5.0   RPE 11-13   Target Heart Rate    Resistance Training Yes   Exercise Blood Pressures    Resting /69   Peak /73   Is BP WDL? Yes   Exercise Activity at Home    Type walking    Frequency 7 days a week   Resistance Training No   Exercise Education    Education Exercise safety, Signs/Symptoms to report, RPE scale, Equipment orientation   Exercise Target Goal    Target Goal(s) Individual exercise RX, BP < 140/90 or < 130/80, if DM or CKD, Aerobic activity 30 + minutes/day  5 days/week   Psychosocial    Stages of Change Preparation   [facility] Total Score 17   PHQ 9 Score 1   Psychosocial Intervention    Interventions No intervention indicated   Currently Taking Psychotropic Meds Yes   Medication Changes No   Psychosocial Education    Education Coping techniques, Environmental triggers, Impact self care behaviors on health, Signs/Symptoms of depression, Stress management class   Psychosocial Target Goals    Target Goal(s) Assess presence or absence of depression using a valid screening tool, Demonstrates appropriate interaction with others, Engages in self-care behaviors, Maximizes coping skills, Positive support group   Nutrition    Stages of Change Preparation   Diabetes Yes   HbA1C 7.0   HbA1C Date 12/13/21   BG at Home Yes   BG Frequency 1x/day   BG in Range?  Yes   Random    Lipids    Date Lipids Drawn 01/05/22   Total 141   HDL 41   LDL 44.8   Triglycerides 27.6   Lipid Med(s) Zocor   Lipid Med Change(s) No   Weight Management    Weight  94.3 kg (208 lb)   Height  6' (1.829 m)   BMI 28.27   Waist Circumference  41\"   Alcohol Special   Type moonshine   Rate Your Plate Total Score 45   Nutrition Intervention    Dietitian Consult No   Nurse/Patient Discussion Yes   Nutrition Class Yes   Diabetes Education Referral No   Lipid Clinic Referral No   Weight Management Referral No Nutrition Education    Education DM & CAD relationship, Carb-controlled diet, Low sodium diet, Healthy eating, Special diet   Nutrition Target Goals    Target Goals BMI less than 25, HbA1C less than 7 percent, Waist size less than 40 inches males and less than 35 inches for females   Education    Learning Barrier Ready to learn   Education Intervention    Education Schedule Given Yes   Patient Education     Education CAD, Risk factors, Med Compliance, Cardiac A&P, Signs/Symptoms of Angina   Hypertension No   Hypertension Controlled Yes   Is BP WDL?  Yes   Med(s) Change No   BP Meds Lisinopril, Metoprolol   Education Target Goals    Target Goals Medication compliance, Risk factors, Understand target guidelines for lipids, Understand target guidelines for B/P   Physician Response        Exercise     CR INTAKE from 2/8/2022 in [address] Common Questions    Any problems changes since your last visit Other (comment)  [initial visit]   Any symptoms while exercising denies   Psychosocial/Stress Level 0   Patient Reported Glucose 133   Resting EKG rhythm SR   Tobacco Use None   ITP Next Review Date 03/08/22   Visit Number/Total Visits 1/36   What is plan for next session start exercise Rx   On Call Medical Director Immediately Available [facility]   Exercise Treatment Log    Target Heart Rate(Range)    Resting HR 65   Resting /69   Recovery HR 71   Recovery /72   Weight 94.3 kg (208 lb)   Exercise EKG Rhythm SR   Exercise Duration 6   Peak HR 94   Peak RPE 12   Peak Mets 3   Asymptomatic yes   Total Minutes 6

## 2022-02-10 ENCOUNTER — HOSPITAL ENCOUNTER (OUTPATIENT)
Dept: CARDIAC REHAB | Age: 67
Discharge: HOME | End: 2022-02-10
Payer: MEDICARE

## 2022-02-10 ENCOUNTER — APPOINTMENT (OUTPATIENT)
Dept: CARDIAC REHAB | Age: 67
End: 2022-02-10
Payer: MEDICARE

## 2022-02-10 VITALS — WEIGHT: 210 LBS | BODY MASS INDEX: 28.48 KG/M2

## 2022-02-10 PROCEDURE — 93798 PHYS/QHP OP CAR RHAB W/ECG: CPT

## 2022-02-15 ENCOUNTER — APPOINTMENT (OUTPATIENT)
Dept: CARDIAC REHAB | Age: 67
End: 2022-02-15
Payer: MEDICARE

## 2022-02-17 ENCOUNTER — HOSPITAL ENCOUNTER (OUTPATIENT)
Dept: CARDIAC REHAB | Age: 67
Discharge: HOME | End: 2022-02-17
Payer: MEDICARE

## 2022-02-17 VITALS — WEIGHT: 211 LBS | BODY MASS INDEX: 28.62 KG/M2

## 2022-02-17 PROCEDURE — 93798 PHYS/QHP OP CAR RHAB W/ECG: CPT

## 2022-02-22 ENCOUNTER — APPOINTMENT (OUTPATIENT)
Dept: CARDIAC REHAB | Age: 67
End: 2022-02-22
Payer: MEDICARE

## 2022-02-22 ENCOUNTER — HOSPITAL ENCOUNTER (OUTPATIENT)
Dept: CARDIAC REHAB | Age: 67
Discharge: HOME | End: 2022-02-22
Payer: MEDICARE

## 2022-02-22 VITALS — WEIGHT: 207 LBS | BODY MASS INDEX: 28.07 KG/M2

## 2022-02-22 PROCEDURE — 93798 PHYS/QHP OP CAR RHAB W/ECG: CPT

## 2022-02-24 ENCOUNTER — HOSPITAL ENCOUNTER (OUTPATIENT)
Dept: CARDIAC REHAB | Age: 67
Discharge: HOME | End: 2022-02-24
Payer: MEDICARE

## 2022-02-24 ENCOUNTER — APPOINTMENT (OUTPATIENT)
Dept: CARDIAC REHAB | Age: 67
End: 2022-02-24
Payer: MEDICARE

## 2022-02-24 VITALS — WEIGHT: 208 LBS | BODY MASS INDEX: 28.21 KG/M2

## 2022-02-24 PROCEDURE — 93798 PHYS/QHP OP CAR RHAB W/ECG: CPT

## 2022-03-01 ENCOUNTER — APPOINTMENT (OUTPATIENT)
Dept: CARDIAC REHAB | Age: 67
End: 2022-03-01
Payer: MEDICARE

## 2022-03-02 ENCOUNTER — HOSPITAL ENCOUNTER (OUTPATIENT)
Dept: CARDIAC REHAB | Age: 67
Discharge: HOME | End: 2022-03-02
Payer: MEDICARE

## 2022-03-02 VITALS — BODY MASS INDEX: 27.8 KG/M2 | WEIGHT: 205 LBS

## 2022-03-02 PROCEDURE — 93798 PHYS/QHP OP CAR RHAB W/ECG: CPT

## 2022-03-03 ENCOUNTER — APPOINTMENT (OUTPATIENT)
Dept: CARDIAC REHAB | Age: 67
End: 2022-03-03
Payer: MEDICARE

## 2022-03-04 ENCOUNTER — HOSPITAL ENCOUNTER (OUTPATIENT)
Dept: CARDIAC REHAB | Age: 67
Discharge: HOME | End: 2022-03-04
Payer: MEDICARE

## 2022-03-04 VITALS — BODY MASS INDEX: 28.35 KG/M2 | WEIGHT: 209 LBS

## 2022-03-04 PROCEDURE — 93798 PHYS/QHP OP CAR RHAB W/ECG: CPT

## 2022-03-04 NOTE — PROGRESS NOTES
CARDIAC REHAB ITP REASSESSMENT FOR REVIEW AND SIGNATURE  Patient name: Howard Reeys : 1955     Visits from Start of Care: 7      Reporting Period:  to 3/4    Subjective Reports:     Goals Comments   1. Increase endurance   [] met                  [] not met  [x] progressing Increase peak METS on the treadmill from 4.1 to 4.3 by next recert. Key functional changes: Increased peak METS from 3.0 to 4.1 on the treadmill during this recert reaching his goal    Problems/ barriers to goal attainment: None    Assessment / Recommendations:Continue w/ rehab    David Courtney RN 3/4/2022 1:17 PM    Cardiac ITP     CR PH[facility] [place]  [facility]   Treatment Diagnosis    Treatment Diagnosis 1 --  [CAD]   Treatment Diagnosis 2 PCI   PCI Date 01/10/22   Referral Date 22   Co-morbidities Diabetes   Individual Treatment Plan    ITP Visit Type Re-Assessment   1st Date of Exercise  22   ITP Next Review Date 22   Visit #/Total Visits    EF % --  [no recent echo performed]   Risk Stratification Low   ITP Exercise, Psychosocial, Tobacco, Nutrition, Education   Exercise     Stages of Change Action   Assisted Devices None   Exercise Prescription    Mode Treadmill, Bike, Stepper, Ergometer   Frequency per week 2-3   Duration per session 35-45   Intensity  METS       2.6-5.0   RPE 11-13   Target Heart Rate    Resistance Training Yes   Exercise Blood Pressures    Resting /67   Peak /67   Is BP WDL?  Yes   Exercise Activity at Home    Type walking    Frequency 7 days a week   Resistance Training No   Exercise Education    Education Exercise safety, Signs/Symptoms to report, RPE scale, Equipment orientation   Exercise Target Goal    Target Goal(s) Individual exercise RX, BP < 140/90 or < 130/80, if DM or CKD, Aerobic activity 30 + minutes/day  5 days/week   Psychosocial    Stages of Change Action   Psychosocial Intervention    Interventions No intervention indicated   Currently Taking Psychotropic Meds Yes   Medication Changes No   Psychosocial Education    Education Coping techniques, Environmental triggers, Impact self care behaviors on health, Signs/Symptoms of depression, Stress management class   Psychosocial Target Goals    Target Goal(s) Assess presence or absence of depression using a valid screening tool, Demonstrates appropriate interaction with others, Engages in self-care behaviors, Maximizes coping skills, Positive support group   Nutrition    Stages of Change Action   Diabetes Yes   HbA1C 7.0   BG at Home Yes   BG Frequency 1x/day   BG in Range? Yes   Random    Lipids    Date Lipids Drawn 01/05/22   Total 141   HDL 41   LDL 44.8   Triglycerides 27.6   Lipid Med(s) Zocor   Lipid Med Change(s) No   Weight Management    Weight  93 kg (205 lb)   Height  6' (1.829 m)   BMI 27.86   Waist Circumference  41\"   Alcohol Special   Type moonshine   Nutrition Intervention    Dietitian Consult No   Nurse/Patient Discussion Yes   Nutrition Class Yes   Diabetes Education Referral No   Lipid Clinic Referral No   Weight Management Referral No   Nutrition Education    Education DM & CAD relationship, Low fat & cholesterol diet, Carb-controlled diet, Low sodium diet, Healthy eating   Nutrition Target Goals    Target Goals BMI less than 25, HbA1C less than 7 percent, Waist size less than 40 inches males and less than 35 inches for females   Education    Learning Barrier Ready to learn   Education Intervention    Education Schedule Given Yes   Patient Education     Education CAD, Risk factors, Med Compliance, Cardiac A&P, Signs/Symptoms of Angina   Hypertension No   Hypertension Controlled Yes   Is BP WDL?  Yes   Med(s) Change No   BP Meds Lisinopril, Metoprolol   Education Target Goals    Target Goals Medication compliance, Risk factors, Understand target guidelines for lipids, Understand target guidelines for B/P   Physician Response        Exercise     CR PHASE II from 3/2/2022 in [facility] CARD[facility]AC [facility]   Rehab Common Questions    Any problems changes since your last visit Denies   Any symptoms while exercising denies   Psychosocial/Stress Level 0   Patient Reported Glucose 139   Resting EKG rhythm SR   Tobacco Use None   ITP Next Review Date 03/08/22   Visit Number/Total Visits 6/36   On Call Medical Director Immediately Available [facility]   Exercise Treatment Log    Target Heart Rate(Range)    Resting HR 72   Resting /74   Recovery HR 70   Recovery /73   Weight 93 kg (205 lb)   Exercise EKG Rhythm SR/ST   Exercise Duration 45   Peak HR 99   Peak RPE 14   Peak Mets 4.3   Asymptomatic yes   Total Minutes 55

## 2022-03-07 ENCOUNTER — TELEPHONE (OUTPATIENT)
Dept: CARDIOLOGY CLINIC | Age: 67
End: 2022-03-07

## 2022-03-07 ENCOUNTER — HOSPITAL ENCOUNTER (OUTPATIENT)
Dept: CARDIAC REHAB | Age: 67
Discharge: HOME | End: 2022-03-07
Payer: MEDICARE

## 2022-03-07 VITALS — WEIGHT: 210 LBS | BODY MASS INDEX: 28.48 KG/M2

## 2022-03-07 DIAGNOSIS — I25.10 CORONARY ARTERY DISEASE INVOLVING NATIVE CORONARY ARTERY OF NATIVE HEART WITHOUT ANGINA PECTORIS: Primary | ICD-10-CM

## 2022-03-07 PROCEDURE — 93798 PHYS/QHP OP CAR RHAB W/ECG: CPT

## 2022-03-07 NOTE — TELEPHONE ENCOUNTER
Medical record reviewed. Patient s/p stent placement 1/2022  Continue DAPT with Brilinta 90 mg BID and aspirin daily. Thank you.

## 2022-03-07 NOTE — TELEPHONE ENCOUNTER
Patient called stating that he was told by the pharmacy that the Brilinta 90 mg tab was discontinued, however states that he was told by [facility] to continue to take until further notice. He had a Cath 1/10/22. Pt need clarification.

## 2022-03-07 NOTE — TELEPHONE ENCOUNTER
Requested Prescriptions     Pending Prescriptions Disp Refills    ticagrelor (BRILINTA) 90 mg tablet 180 Tablet 1     Sig: Take 1 Tablet by mouth two (2) times a day.

## 2022-03-08 ENCOUNTER — APPOINTMENT (OUTPATIENT)
Dept: CARDIAC REHAB | Age: 67
End: 2022-03-08
Payer: MEDICARE

## 2022-03-09 ENCOUNTER — HOSPITAL ENCOUNTER (OUTPATIENT)
Dept: CARDIAC REHAB | Age: 67
Discharge: HOME | End: 2022-03-09
Payer: MEDICARE

## 2022-03-09 VITALS — WEIGHT: 209 LBS | BODY MASS INDEX: 28.35 KG/M2

## 2022-03-09 PROCEDURE — 93798 PHYS/QHP OP CAR RHAB W/ECG: CPT

## 2022-03-10 ENCOUNTER — APPOINTMENT (OUTPATIENT)
Dept: CARDIAC REHAB | Age: 67
End: 2022-03-10
Payer: MEDICARE

## 2022-03-14 ENCOUNTER — APPOINTMENT (OUTPATIENT)
Dept: CARDIAC REHAB | Age: 67
End: 2022-03-14
Payer: MEDICARE

## 2022-03-14 ENCOUNTER — HOSPITAL ENCOUNTER (OUTPATIENT)
Dept: CARDIAC REHAB | Age: 67
Discharge: HOME | End: 2022-03-14
Payer: MEDICARE

## 2022-03-14 VITALS — WEIGHT: 208 LBS | BODY MASS INDEX: 28.21 KG/M2

## 2022-03-14 PROCEDURE — 93798 PHYS/QHP OP CAR RHAB W/ECG: CPT

## 2022-03-15 ENCOUNTER — APPOINTMENT (OUTPATIENT)
Dept: CARDIAC REHAB | Age: 67
End: 2022-03-15
Payer: MEDICARE

## 2022-03-16 ENCOUNTER — APPOINTMENT (OUTPATIENT)
Dept: CARDIAC REHAB | Age: 67
End: 2022-03-16
Payer: MEDICARE

## 2022-03-16 ENCOUNTER — HOSPITAL ENCOUNTER (OUTPATIENT)
Dept: CARDIAC REHAB | Age: 67
Discharge: HOME | End: 2022-03-16
Payer: MEDICARE

## 2022-03-16 VITALS — BODY MASS INDEX: 28.21 KG/M2 | WEIGHT: 208 LBS

## 2022-03-16 PROCEDURE — 93798 PHYS/QHP OP CAR RHAB W/ECG: CPT

## 2022-03-17 ENCOUNTER — APPOINTMENT (OUTPATIENT)
Dept: CARDIAC REHAB | Age: 67
End: 2022-03-17
Payer: MEDICARE

## 2022-03-18 ENCOUNTER — APPOINTMENT (OUTPATIENT)
Dept: CARDIAC REHAB | Age: 67
End: 2022-03-18
Payer: MEDICARE

## 2022-03-19 PROBLEM — Z95.820 STATUS POST ANGIOPLASTY WITH STENT: Status: ACTIVE | Noted: 2022-01-10

## 2022-03-20 PROBLEM — I20.89 STABLE ANGINA PECTORIS: Status: ACTIVE | Noted: 2022-01-05

## 2022-03-20 PROBLEM — I20.8 STABLE ANGINA PECTORIS (HCC): Status: ACTIVE | Noted: 2022-01-05

## 2022-03-20 PROBLEM — E11.21 TYPE 2 DIABETES WITH NEPHROPATHY (HCC): Status: ACTIVE | Noted: 2018-03-13

## 2022-03-21 ENCOUNTER — APPOINTMENT (OUTPATIENT)
Dept: CARDIAC REHAB | Age: 67
End: 2022-03-21
Payer: MEDICARE

## 2022-03-21 ENCOUNTER — HOSPITAL ENCOUNTER (OUTPATIENT)
Dept: CARDIAC REHAB | Age: 67
Discharge: HOME | End: 2022-03-21
Payer: MEDICARE

## 2022-03-21 VITALS — BODY MASS INDEX: 28.21 KG/M2 | WEIGHT: 208 LBS

## 2022-03-21 PROCEDURE — 93798 PHYS/QHP OP CAR RHAB W/ECG: CPT

## 2022-03-22 ENCOUNTER — APPOINTMENT (OUTPATIENT)
Dept: CARDIAC REHAB | Age: 67
End: 2022-03-22
Payer: MEDICARE

## 2022-03-23 ENCOUNTER — HOSPITAL ENCOUNTER (OUTPATIENT)
Dept: CARDIAC REHAB | Age: 67
Discharge: HOME | End: 2022-03-23
Payer: MEDICARE

## 2022-03-23 ENCOUNTER — APPOINTMENT (OUTPATIENT)
Dept: CARDIAC REHAB | Age: 67
End: 2022-03-23
Payer: MEDICARE

## 2022-03-23 VITALS — WEIGHT: 207 LBS | BODY MASS INDEX: 28.07 KG/M2

## 2022-03-23 PROCEDURE — 93798 PHYS/QHP OP CAR RHAB W/ECG: CPT

## 2022-03-24 ENCOUNTER — APPOINTMENT (OUTPATIENT)
Dept: CARDIAC REHAB | Age: 67
End: 2022-03-24
Payer: MEDICARE

## 2022-03-25 ENCOUNTER — HOSPITAL ENCOUNTER (OUTPATIENT)
Dept: CARDIAC REHAB | Age: 67
Discharge: HOME | End: 2022-03-25
Payer: MEDICARE

## 2022-03-25 ENCOUNTER — APPOINTMENT (OUTPATIENT)
Dept: CARDIAC REHAB | Age: 67
End: 2022-03-25
Payer: MEDICARE

## 2022-03-25 VITALS — BODY MASS INDEX: 28.21 KG/M2 | WEIGHT: 208 LBS

## 2022-03-25 PROCEDURE — 93798 PHYS/QHP OP CAR RHAB W/ECG: CPT

## 2022-03-28 ENCOUNTER — APPOINTMENT (OUTPATIENT)
Dept: CARDIAC REHAB | Age: 67
End: 2022-03-28
Payer: MEDICARE

## 2022-03-29 ENCOUNTER — APPOINTMENT (OUTPATIENT)
Dept: CARDIAC REHAB | Age: 67
End: 2022-03-29
Payer: MEDICARE

## 2022-03-30 ENCOUNTER — APPOINTMENT (OUTPATIENT)
Dept: CARDIAC REHAB | Age: 67
End: 2022-03-30
Payer: MEDICARE

## 2022-03-30 NOTE — PROGRESS NOTES
CARDIAC REHAB ITP REASSESSMENT FOR REVIEW AND SIGNATURE  Patient name: Sushma Mejía : 1955     Visits from Start of Care:       Reporting Period: 3/4 to 3/30    Subjective Reports: Patient progressing well. Stated he will be out of town for next 2 weeks and will continue exercising while on vacation. Goals Comments   1. Increase endurance   [x] met                  [] not met  [] progressing Patient met goal of peak METs on TM  (4.1-4.3)he is currently at 4.5 METs. 2.Start resistance training   [] met                  [] not met  [x] progressing New goal   3. Nutrition education  Via educational videos at [facility] [] met                  [] not met  [x] progressing New goal   4. Add rowing machine to workout    [] met                  [] not met  [x] progressing New Goal     Key functional changes: endurance increased    Problems/ barriers to goal attainment: None     Assessment / Recommendations: Continue w/ rehab    Celia Vicente RN 3/30/2022 9:44 AM    Cardiac ITP     CR PHASE II from 3/30/2022 in [facility]   Treatment Diagnosis    Treatment Diagnosis 1 PCI   PCI Date 01/10/22   Referral Date 22   Co-morbidities Diabetes   Individual Treatment Plan    ITP Visit Type Re-Assessment   1st Date of Exercise  22   ITP Next Review Date 22   Visit #/Total Visits    EF % 54 %  [no recent echo performed]   Risk Stratification Low   ITP Exercise, Psychosocial, Tobacco, Nutrition, Education   Exercise     Stages of Change Action   Assisted Devices None   Exercise Prescription    Mode Treadmill, Bike, Stepper, Ergometer   Frequency per week 2-3   Duration per session 35-45   Intensity  METS       4.5-5.5   RPE 11-13   Target Heart Rate    Resistance Training Yes   Exercise Blood Pressures    Resting /72   Peak /67   Is BP WDL?  Yes   Exercise Activity at Home    Type walking    Frequency 7 days a week   Resistance Training No   Exercise Education    Education Exercise safety, Signs/Symptoms to report, RPE scale, Equipment orientation   Exercise Target Goal    Target Goal(s) Individual exercise RX, BP < 140/90 or < 130/80, if DM or CKD, Aerobic activity 30 + minutes/day  5 days/week   Psychosocial    Stages of Change Action   Psychosocial Intervention    Medication Changes No   Psychosocial Education    Education Coping techniques, Environmental triggers, Impact self care behaviors on health, Signs/Symptoms of depression, Stress management class   Psychosocial Target Goals    Target Goal(s) Assess presence or absence of depression using a valid screening tool, Demonstrates appropriate interaction with others, Engages in self-care behaviors, Maximizes coping skills, Positive support group   Nutrition    Stages of Change Action   Diabetes Yes   HbA1C 7.0   BG at Home Yes   BG Frequency 1x/day   BG in Range? Yes   Random    Lipids    Date Lipids Drawn 01/05/22   Total 141   HDL 41   LDL 44.8   Triglycerides 27.6   Lipid Med(s) Zocor   Lipid Med Change(s) No   Weight Management    Weight  94.3 kg (208 lb)   Height  6' (1.829 m)   BMI 28.27   Waist Circumference  41\"   Alcohol Special   Type moonshine   Nutrition Intervention    Dietitian Consult No   Nurse/Patient Discussion Yes   Nutrition Class Yes   Diabetes Education Referral No   Lipid Clinic Referral No   Weight Management Referral No   Nutrition Education    Nutrition Target Goals    Target Goals BMI less than 25, HbA1C less than 7 percent, Waist size less than 40 inches males and less than 35 inches for females   Education    Learning Barrier Ready to learn   Education Intervention    Education Schedule Given Yes   Patient Education     Education CAD, Risk factors, Med Compliance, Cardiac A&P, Signs/Symptoms of Angina   Hypertension No   Hypertension Controlled Yes   Is BP WDL?  Yes   Med(s) Change No   BP Meds Lisinopril, Metoprolol   Education Target Goals    Target Goals Medication compliance, Risk factors, Understand target guidelines for lipids, Understand target guidelines for B/P   Physician Response        Exercise     CR PHASE II from 3/25/2022 in [address] Common Questions    Any problems changes since your last visit Denies   Any symptoms while exercising denies   Psychosocial/Stress Level 0   Patient Reported Glucose 153   Resting EKG rhythm SR   Tobacco Use None   ITP Next Review Date 04/28/22   Visit Number/Total Visits 14/36   On Call Medical Director Immediately Available [facility]   Exercise Treatment Log    Target Heart Rate(Range)    Resting HR 62   Resting /72   Recovery HR 80   Recovery /73   Weight 94.3 kg (208 lb)   Exercise EKG Rhythm SR   Exercise Duration 45   Peak HR 93   Peak RPE 14   Peak Mets 4.5   Asymptomatic yes   Total Minutes 55

## 2022-03-31 ENCOUNTER — APPOINTMENT (OUTPATIENT)
Dept: CARDIAC REHAB | Age: 67
End: 2022-03-31
Payer: MEDICARE

## 2022-04-01 ENCOUNTER — APPOINTMENT (OUTPATIENT)
Dept: CARDIAC REHAB | Age: 67
End: 2022-04-01
Payer: MEDICARE

## 2022-04-04 ENCOUNTER — APPOINTMENT (OUTPATIENT)
Dept: CARDIAC REHAB | Age: 67
End: 2022-04-04
Payer: MEDICARE

## 2022-04-05 ENCOUNTER — APPOINTMENT (OUTPATIENT)
Dept: CARDIAC REHAB | Age: 67
End: 2022-04-05
Payer: MEDICARE

## 2022-04-06 ENCOUNTER — APPOINTMENT (OUTPATIENT)
Dept: CARDIAC REHAB | Age: 67
End: 2022-04-06
Payer: MEDICARE

## 2022-04-07 ENCOUNTER — APPOINTMENT (OUTPATIENT)
Dept: CARDIAC REHAB | Age: 67
End: 2022-04-07
Payer: MEDICARE

## 2022-04-08 ENCOUNTER — APPOINTMENT (OUTPATIENT)
Dept: CARDIAC REHAB | Age: 67
End: 2022-04-08
Payer: MEDICARE

## 2022-04-08 ENCOUNTER — HOSPITAL ENCOUNTER (OUTPATIENT)
Dept: CARDIAC REHAB | Age: 67
Discharge: HOME | End: 2022-04-08
Payer: MEDICARE

## 2022-04-08 VITALS — BODY MASS INDEX: 27.67 KG/M2 | WEIGHT: 204 LBS

## 2022-04-08 PROCEDURE — 93798 PHYS/QHP OP CAR RHAB W/ECG: CPT

## 2022-04-11 ENCOUNTER — HOSPITAL ENCOUNTER (OUTPATIENT)
Dept: CARDIAC REHAB | Age: 67
Discharge: HOME | End: 2022-04-11
Payer: MEDICARE

## 2022-04-11 VITALS — WEIGHT: 205 LBS | BODY MASS INDEX: 27.8 KG/M2

## 2022-04-11 PROCEDURE — 93798 PHYS/QHP OP CAR RHAB W/ECG: CPT

## 2022-04-12 ENCOUNTER — APPOINTMENT (OUTPATIENT)
Dept: CARDIAC REHAB | Age: 67
End: 2022-04-12
Payer: MEDICARE

## 2022-04-13 ENCOUNTER — HOSPITAL ENCOUNTER (OUTPATIENT)
Dept: CARDIAC REHAB | Age: 67
Discharge: HOME | End: 2022-04-13
Payer: MEDICARE

## 2022-04-13 VITALS — WEIGHT: 205 LBS | BODY MASS INDEX: 27.8 KG/M2

## 2022-04-13 PROCEDURE — 93798 PHYS/QHP OP CAR RHAB W/ECG: CPT

## 2022-04-18 ENCOUNTER — APPOINTMENT (OUTPATIENT)
Dept: CARDIAC REHAB | Age: 67
End: 2022-04-18
Payer: MEDICARE

## 2022-04-20 ENCOUNTER — HOSPITAL ENCOUNTER (OUTPATIENT)
Dept: CARDIAC REHAB | Age: 67
Discharge: HOME | End: 2022-04-20
Payer: MEDICARE

## 2022-04-20 VITALS — BODY MASS INDEX: 27.53 KG/M2 | WEIGHT: 203 LBS

## 2022-04-20 PROCEDURE — 93798 PHYS/QHP OP CAR RHAB W/ECG: CPT

## 2022-04-22 ENCOUNTER — HOSPITAL ENCOUNTER (OUTPATIENT)
Dept: CARDIAC REHAB | Age: 67
Discharge: HOME | End: 2022-04-22
Payer: MEDICARE

## 2022-04-22 VITALS — WEIGHT: 208 LBS | BODY MASS INDEX: 28.21 KG/M2

## 2022-04-22 PROCEDURE — 93798 PHYS/QHP OP CAR RHAB W/ECG: CPT

## 2022-04-25 ENCOUNTER — HOSPITAL ENCOUNTER (OUTPATIENT)
Dept: CARDIAC REHAB | Age: 67
Discharge: HOME | End: 2022-04-25
Payer: MEDICARE

## 2022-04-25 VITALS — WEIGHT: 205 LBS | BODY MASS INDEX: 27.8 KG/M2

## 2022-04-25 PROCEDURE — 93798 PHYS/QHP OP CAR RHAB W/ECG: CPT

## 2022-04-27 ENCOUNTER — HOSPITAL ENCOUNTER (OUTPATIENT)
Dept: CARDIAC REHAB | Age: 67
Discharge: HOME | End: 2022-04-27
Payer: MEDICARE

## 2022-04-27 VITALS — BODY MASS INDEX: 28.21 KG/M2 | WEIGHT: 208 LBS

## 2022-04-27 PROCEDURE — 93798 PHYS/QHP OP CAR RHAB W/ECG: CPT

## 2022-04-27 NOTE — PROGRESS NOTES
CARDIAC REHAB ITP REASSESSMENT FOR REVIEW AND SIGNATURE  Patient name: Arely Bang : 1955     Visits from Start of Care:       Reporting Period: 3/30 to     Subjective Reports: no complains, patient progressing well. Goals Comments   1. Start resistance training   [] met                  [] not met  [x] progressing  plans are to start resistance training next week   2. Nutrition education via recorded lectures at rehab session [] met                  [] not met  [x] progressing Will consider and let us know   3. Add rowing machine to workout   [] met                  [] not met  [x] progressing Mr. Tanya Lei has been working out on the rowing machine since last ITP and stated he has it set on 5. Key functional changes:  Increase his endurance    Problems/ barriers to goal attainment: None     Assessment / Recommendations:Continue w/ rehab    Jasmeet Montano RN 2022 11:02 AM    Cardiac ITP     CR PHASE II from 2022 in [place] [address]   Treatment Diagnosis    Treatment Diagnosis 1 PCI   PCI Date 01/10/22   Referral Date 22   Co-morbidities Diabetes   Individual Treatment Plan    ITP Visit Type Re-Assessment   1st Date of Exercise  22   ITP Next Review Date 22   Visit #/Total Visits    EF % 54 %  [no recent echo performed]   Risk Stratification Low   ITP Exercise, Psychosocial, Tobacco, Nutrition, Education   Exercise     Stages of Change Action   Assisted Devices None   Exercise Prescription    Mode Treadmill, Bike, Stepper, Ergometer   Frequency per week 2-3   Duration per session 35-45   Intensity  METS       4.5-5.5   RPE 11-13   Target Heart Rate    Resistance Training Yes   Exercise Blood Pressures    Resting /70   Peak /67   Is BP WDL?  Yes   Exercise Activity at Home    Type walking    Frequency 7 days a week   Resistance Training No   Exercise Education    Education Exercise safety, Signs/Symptoms to report, RPE scale, Equipment orientation Exercise Target Goal    Target Goal(s) Individual exercise RX, BP < 140/90 or < 130/80, if DM or CKD, Aerobic activity 30 + minutes/day  5 days/week   Psychosocial    Stages of Change Action   Psychosocial Intervention    Medication Changes No   Psychosocial Education    Education Coping techniques, Environmental triggers, Impact self care behaviors on health, Signs/Symptoms of depression, Stress management class   Psychosocial Target Goals    Target Goal(s) Assess presence or absence of depression using a valid screening tool, Demonstrates appropriate interaction with others, Engages in self-care behaviors, Maximizes coping skills, Positive support group   Nutrition    Stages of Change Action   Diabetes Yes   HbA1C 7.0   BG at Home Yes   BG Frequency 1x/day   BG in Range? Yes   Random    Lipids    Date Lipids Drawn 01/05/22   Total 141   HDL 41   LDL 44.8   Triglycerides 27.6   Lipid Med(s) Zocor   Lipid Med Change(s) No   Weight Management    Weight  94.3 kg (208 lb)   Height  6' (1.829 m)   BMI 28.27   Waist Circumference  41\"   Alcohol Special   Type moonshine   Nutrition Intervention    Dietitian Consult No   Nurse/Patient Discussion Yes   Nutrition Class Yes   Diabetes Education Referral No   Lipid Clinic Referral No   Weight Management Referral No   Nutrition Education    Nutrition Target Goals    Target Goals BMI less than 25, HbA1C less than 7 percent, Waist size less than 40 inches males and less than 35 inches for females   Education    Learning Barrier Ready to learn   Education Intervention    Education Schedule Given Yes   Patient Education     Education CAD, Risk factors, Med Compliance, Cardiac A&P, Signs/Symptoms of Angina   Hypertension No   Hypertension Controlled Yes   Is BP WDL?  Yes   Med(s) Change No   BP Meds Lisinopril, Metoprolol   Education Target Goals    Target Goals Medication compliance, Risk factors, Understand target guidelines for lipids, Understand target guidelines for B/P Physician Response        Exercise     CR PHASE II from 4/27/2022 in [facility] [address] [facility] Common Questions    Any problems changes since your last visit Denies   Any symptoms while exercising denies   Psychosocial/Stress Level 0   Patient Reported Glucose    Resting EKG rhythm SR   Tobacco Use None   ITP Next Review Date 04/28/22   Visit Number/Total Visits 21/36   On Call Medical Director Immediately Available [facility]   Exercise Treatment Log    Target Heart Rate(Range)    Resting HR 83   Resting /70   Recovery HR 73   Recovery /60   Weight 94.3 kg (208 lb)   Exercise EKG Rhythm SR   Exercise Duration 45   Peak    Peak RPE 13   Peak Mets 4.3   Asymptomatic yes   Total Minutes 55

## 2022-04-29 ENCOUNTER — HOSPITAL ENCOUNTER (OUTPATIENT)
Dept: CARDIAC REHAB | Age: 67
Discharge: HOME | End: 2022-04-29
Payer: MEDICARE

## 2022-04-29 VITALS — WEIGHT: 208 LBS | BODY MASS INDEX: 28.21 KG/M2

## 2022-04-29 PROCEDURE — 93798 PHYS/QHP OP CAR RHAB W/ECG: CPT

## 2022-05-02 ENCOUNTER — HOSPITAL ENCOUNTER (OUTPATIENT)
Dept: CARDIAC REHAB | Age: 67
Discharge: HOME | End: 2022-05-02
Payer: MEDICARE

## 2022-05-02 VITALS — WEIGHT: 208 LBS | BODY MASS INDEX: 28.21 KG/M2

## 2022-05-02 PROCEDURE — 93798 PHYS/QHP OP CAR RHAB W/ECG: CPT

## 2022-05-04 ENCOUNTER — HOSPITAL ENCOUNTER (OUTPATIENT)
Dept: CARDIAC REHAB | Age: 67
Discharge: HOME | End: 2022-05-04
Payer: MEDICARE

## 2022-05-04 VITALS — WEIGHT: 206 LBS | BODY MASS INDEX: 27.94 KG/M2

## 2022-05-04 PROCEDURE — 93798 PHYS/QHP OP CAR RHAB W/ECG: CPT

## 2022-05-06 ENCOUNTER — HOSPITAL ENCOUNTER (OUTPATIENT)
Dept: CARDIAC REHAB | Age: 67
Discharge: HOME | End: 2022-05-06
Payer: MEDICARE

## 2022-05-06 VITALS — BODY MASS INDEX: 27.8 KG/M2 | WEIGHT: 205 LBS

## 2022-05-06 PROCEDURE — 93798 PHYS/QHP OP CAR RHAB W/ECG: CPT

## 2022-05-09 ENCOUNTER — HOSPITAL ENCOUNTER (OUTPATIENT)
Dept: CARDIAC REHAB | Age: 67
Discharge: HOME | End: 2022-05-09
Payer: MEDICARE

## 2022-05-09 ENCOUNTER — PATIENT MESSAGE (OUTPATIENT)
Dept: FAMILY MEDICINE CLINIC | Age: 67
End: 2022-05-09

## 2022-05-09 VITALS — WEIGHT: 207 LBS | BODY MASS INDEX: 28.07 KG/M2

## 2022-05-09 PROCEDURE — 93798 PHYS/QHP OP CAR RHAB W/ECG: CPT

## 2022-05-09 NOTE — TELEPHONE ENCOUNTER
Last Visit: 1/27/22 with MD Andrew Ritchie  Next Appointment: 5/31/22 with MD Andrew Ritchie  Previous Refill Encounter(s): 10/3/19 #100 with 3 refills    Requested Prescriptions     Pending Prescriptions Disp Refills    glucose blood VI test strips (FreeStyle Lite Strips) strip 100 Strip 3     Sig: Blood sugar check daily.

## 2022-05-09 NOTE — TELEPHONE ENCOUNTER
From: Veronia Dakins  To: Celi Lee MD  Sent: 5/9/2022 11:37 AM EDT  Subject: free style lite test strips     [facility] says [facility] faxed a renewal over a week ago but they still have not recieved an order from your office.     can you follow up and see what they holdup is i have been out of testing strips for 2 weeks   thanks Debra Cain

## 2022-05-11 ENCOUNTER — HOSPITAL ENCOUNTER (OUTPATIENT)
Dept: CARDIAC REHAB | Age: 67
Discharge: HOME | End: 2022-05-11
Payer: MEDICARE

## 2022-05-11 VITALS — BODY MASS INDEX: 28.07 KG/M2 | WEIGHT: 207 LBS

## 2022-05-11 PROCEDURE — 93798 PHYS/QHP OP CAR RHAB W/ECG: CPT

## 2022-05-11 NOTE — PROGRESS NOTES
CARDIAC ITP REASSESSMENT FOR REVIEW AND SIGNATURE CARDIAC ITP REASSESSMENT FOR REVIEW AND SIGNATURE     CARDIAC REHAB ITP REASSESSMENT FOR REVIEW AND SIGNATURE  Patient name: Khoi Brambila : 1955      Visits from Start of Care:                                              Reporting Period:  to      Subjective Reports: no complaints, patient progressing well.          Goals Comments   1. Start resistance training    [x]? met                      []? not met  []? progressing Started resistance training, increase as tolerated. 2. Nutrition education via credible resources. Goal A1C 6.0 or below. Will use internet and nurse resources for questions. []? met                      []? not met  [x]? progressing Pt has repeat A1C in one week. Last HGBA1C is 6.2. Pt is aware of foods and carbs high in sugar. Beginning to modify diet slowly. Eats a lot of green vegetables. Prefers to research on line using credible resources. 3. Add rowing machine to workout    [x]? met                      []? not met  []? progressing Mr. Mery Valente has been working out on the rowing machine since last ITP and stated he has it set on  at [facility]      Key functional changes:  Increase his endurance/control diabetes     Problems/ barriers to goal attainment: None      Assessment / Recommendations:Progressing well.  Continue w/ rehab, improve diet       CR PHASE II from 2022 in [facility] [address]   Treatment Diagnosis    Treatment Diagnosis 1 PCI   PCI Date 01/10/22   PCI Date 01/10/22   Referral Date 22   Co-morbidities Diabetes   Individual Treatment Plan    ITP Visit Type Re-Assessment   1st Date of Exercise  22   ITP Next Review Date 06/10/22   Visit #/Total Visits    EF % 54 %  [no recent echo performed]   Risk Stratification Low   ITP Exercise, Psychosocial, Tobacco, Nutrition, Education   Exercise     Stages of Change Action   Assisted Devices None   Exercise Prescription    Mode Treadmill, Bike, Stepper, Ergometer   Frequency per week 2-3   Duration per session 35-45   Intensity  METS       4.5-5.5   RPE 11-13   Target Heart Rate    Resistance Training Yes   Exercise Blood Pressures    Resting /67   Peak /67   Is BP WDL? Yes   Exercise Activity at Home    Type walking    Frequency 7 days a week   Resistance Training No   Exercise Education    Education Exercise safety, Signs/Symptoms to report, RPE scale, Equipment orientation   Exercise Target Goal    Target Goal(s) Individual exercise RX, BP < 140/90 or < 130/80, if DM or CKD, Aerobic activity 30 + minutes/day  5 days/week   Psychosocial    Stages of Change Action   Psychosocial Intervention    Medication Changes No   Psychosocial Education    Education Coping techniques, Environmental triggers, Impact self care behaviors on health, Signs/Symptoms of depression, Stress management class   Psychosocial Target Goals    Target Goal(s) Assess presence or absence of depression using a valid screening tool, Demonstrates appropriate interaction with others, Engages in self-care behaviors, Maximizes coping skills, Positive support group   Nutrition    Stages of Change Action   Diabetes Yes   HbA1C 6.2   BG at Home Yes   BG Frequency 1x/day   BG in Range?  Yes   Random BG     Lipids    Date Lipids Drawn 01/05/22   Total 141   HDL 41   LDL 44.8   Triglycerides 27.6   Lipid Med(s) Zocor   Lipid Med Change(s) No   Weight Management    Weight  93.9 kg (207 lb)   Height  6' (1.829 m)   BMI 28.13   Waist Circumference  41\"   Alcohol Special   Type moonshine   Nutrition Intervention    Dietitian Consult No   Nurse/Patient Discussion Yes   Nutrition Goals --  [decrease pastries, continue green vegetables, education]   Nutrition Class No   Diabetes Education Referral No   Lipid Clinic Referral No   Weight Management Referral No   Nutrition Education    Nutrition Target Goals    Target Goals BMI less than 25, HbA1C less than 7 percent, Waist size less than 40 inches males and less than 35 inches for females   Patient Stated Nutrition Goals cut back pastries,, continue green vegetables   Education    Learning Barrier Ready to learn   Education Intervention    Education Schedule Given Yes   Patient Education     Education CAD, Risk factors, Med Compliance, Cardiac A&P, Signs/Symptoms of Angina   Hypertension No   Hypertension Controlled Yes   Is BP WDL?  Yes   Med(s) Change No   BP Meds Lisinopril, Metoprolol   Education Target Goals    Target Goals Medication compliance, Risk factors, Understand target guidelines for lipids, Understand target guidelines for B/P   Physician Response        Exercise     CR PHASE II from 5/11/2022 in [address] [facility] Common Questions    Any problems changes since your last visit Denies   Any symptoms while exercising denies   Psychosocial/Stress Level 0   Resting EKG rhythm SR   Tobacco Use None   ITP Next Review Date 06/21/22   Visit Number/Total Visits 27/36   On Call Medical Director Immediately Available [facility]   Exercise Treatment Log    Target Heart Rate(Range)    Resting HR 74   Resting /67   Recovery HR 68   Recovery /67   Weight 93.9 kg (207 lb)   Exercise EKG Rhythm SR   Exercise Duration 45   Peak HR 80   Peak RPE 13   Peak Mets 4.3   Asymptomatic yes   Total Minutes 55

## 2022-05-13 ENCOUNTER — HOSPITAL ENCOUNTER (OUTPATIENT)
Dept: CARDIAC REHAB | Age: 67
Discharge: HOME | End: 2022-05-13
Payer: MEDICARE

## 2022-05-13 VITALS — WEIGHT: 205 LBS | BODY MASS INDEX: 27.8 KG/M2

## 2022-05-13 PROCEDURE — 93798 PHYS/QHP OP CAR RHAB W/ECG: CPT

## 2022-05-13 RX ORDER — BLOOD-GLUCOSE METER
KIT MISCELLANEOUS
Qty: 100 STRIP | Refills: 3 | Status: SHIPPED | OUTPATIENT
Start: 2022-05-13

## 2022-05-23 ENCOUNTER — HOSPITAL ENCOUNTER (OUTPATIENT)
Dept: CARDIAC REHAB | Age: 67
Discharge: HOME | End: 2022-05-23
Payer: MEDICARE

## 2022-05-23 VITALS — WEIGHT: 205 LBS | BODY MASS INDEX: 27.8 KG/M2

## 2022-05-23 PROCEDURE — 93798 PHYS/QHP OP CAR RHAB W/ECG: CPT

## 2022-05-25 ENCOUNTER — HOSPITAL ENCOUNTER (OUTPATIENT)
Dept: CARDIAC REHAB | Age: 67
Discharge: HOME | End: 2022-05-25
Payer: MEDICARE

## 2022-05-25 VITALS — WEIGHT: 207 LBS | BODY MASS INDEX: 28.07 KG/M2

## 2022-05-25 PROCEDURE — 93798 PHYS/QHP OP CAR RHAB W/ECG: CPT

## 2022-05-27 ENCOUNTER — HOSPITAL ENCOUNTER (OUTPATIENT)
Dept: CARDIAC REHAB | Age: 67
Discharge: HOME | End: 2022-05-27
Payer: MEDICARE

## 2022-05-27 ENCOUNTER — HOSPITAL ENCOUNTER (OUTPATIENT)
Dept: CARDIAC REHAB | Age: 67
End: 2022-05-27
Payer: MEDICARE

## 2022-05-27 VITALS — BODY MASS INDEX: 27.94 KG/M2 | WEIGHT: 206 LBS

## 2022-05-27 PROCEDURE — 93798 PHYS/QHP OP CAR RHAB W/ECG: CPT

## 2022-05-31 ENCOUNTER — OFFICE VISIT (OUTPATIENT)
Dept: FAMILY MEDICINE CLINIC | Age: 67
End: 2022-05-31
Payer: MEDICARE

## 2022-05-31 VITALS
RESPIRATION RATE: 18 BRPM | WEIGHT: 207 LBS | SYSTOLIC BLOOD PRESSURE: 111 MMHG | HEART RATE: 60 BPM | OXYGEN SATURATION: 97 % | BODY MASS INDEX: 28.04 KG/M2 | HEIGHT: 72 IN | TEMPERATURE: 97.2 F | DIASTOLIC BLOOD PRESSURE: 68 MMHG

## 2022-05-31 DIAGNOSIS — E11.65 TYPE 2 DIABETES MELLITUS WITH HYPERGLYCEMIA, WITHOUT LONG-TERM CURRENT USE OF INSULIN (HCC): Primary | ICD-10-CM

## 2022-05-31 DIAGNOSIS — E78.00 HYPERCHOLESTEROLEMIA: ICD-10-CM

## 2022-05-31 DIAGNOSIS — C64.2 RENAL CELL CARCINOMA OF LEFT KIDNEY (HCC): ICD-10-CM

## 2022-05-31 LAB — HBA1C MFR BLD HPLC: 7.6 %

## 2022-05-31 PROCEDURE — 1123F ACP DISCUSS/DSCN MKR DOCD: CPT | Performed by: FAMILY MEDICINE

## 2022-05-31 PROCEDURE — 99214 OFFICE O/P EST MOD 30 MIN: CPT | Performed by: FAMILY MEDICINE

## 2022-05-31 PROCEDURE — 83036 HEMOGLOBIN GLYCOSYLATED A1C: CPT | Performed by: FAMILY MEDICINE

## 2022-05-31 PROCEDURE — 3051F HG A1C>EQUAL 7.0%<8.0%: CPT | Performed by: FAMILY MEDICINE

## 2022-05-31 PROCEDURE — 1101F PT FALLS ASSESS-DOCD LE1/YR: CPT | Performed by: FAMILY MEDICINE

## 2022-05-31 PROCEDURE — G8536 NO DOC ELDER MAL SCRN: HCPCS | Performed by: FAMILY MEDICINE

## 2022-05-31 PROCEDURE — 3017F COLORECTAL CA SCREEN DOC REV: CPT | Performed by: FAMILY MEDICINE

## 2022-05-31 PROCEDURE — G8510 SCR DEP NEG, NO PLAN REQD: HCPCS | Performed by: FAMILY MEDICINE

## 2022-05-31 PROCEDURE — G8417 CALC BMI ABV UP PARAM F/U: HCPCS | Performed by: FAMILY MEDICINE

## 2022-05-31 PROCEDURE — G8427 DOCREV CUR MEDS BY ELIG CLIN: HCPCS | Performed by: FAMILY MEDICINE

## 2022-05-31 PROCEDURE — 2022F DILAT RTA XM EVC RTNOPTHY: CPT | Performed by: FAMILY MEDICINE

## 2022-05-31 PROCEDURE — G0463 HOSPITAL OUTPT CLINIC VISIT: HCPCS | Performed by: FAMILY MEDICINE

## 2022-05-31 RX ORDER — METOPROLOL TARTRATE 25 MG/1
12.5 TABLET, FILM COATED ORAL 2 TIMES DAILY
Qty: 90 TABLET | Refills: 3 | Status: SHIPPED | OUTPATIENT
Start: 2022-05-31

## 2022-05-31 NOTE — PATIENT INSTRUCTIONS
Learning About Meal Planning for Diabetes  Why plan your meals? Meal planning can be a key part of managing diabetes. Planning meals and snacks with the right balance of carbohydrate, protein, and fat can help you keep your blood sugar at the target level you set with your doctor. You don't have to eat special foods. You can eat what your family eats, including sweets once in a while. But you do have to pay attention to how often you eat and how much you eat of certain foods. You may want to work with a dietitian or a diabetes educator. They can give you tips and meal ideas and can answer your questions about meal planning. This health professional can also help you reach a healthy weight if that is one of your goals. What plan is right for you? Your dietitian or diabetes educator may suggest that you start with the plate format or carbohydrate counting. The plate format  The plate format is a simple way to help you manage how you eat. You plan meals by learning how much space each food should take on a plate. Using the plate format helps you manage the amount of carbohydrate you eat. It can make it easier to keep your blood sugar level within your target range. It also helps you see if you're eating healthy portion sizes. To use the plate format, you put non-starchy vegetables on half your plate. Add lean protein foods, such as fish, lean meats and poultry, or soy products, on one-quarter of the plate. Put a grain or starchy vegetable (such as brown rice or a potato) on the final quarter of the plate. You can add a small piece of fruit and some low-fat or fat-free milk or yogurt, depending on your carbohydrate goal for each meal.  Here are some tips for using the plate format:  · Make sure that you are not using an oversized plate. A 9-inch plate is best. Many restaurants use larger plates. · Get used to using the plate format at home. Then you can use it when you eat out.   · Write down your questions about using the plate format. Talk to your doctor, a dietitian, or a diabetes educator about your concerns. Carbohydrate counting  With carbohydrate counting, you plan meals based on the amount of carbohydrate in each food. Carbohydrate raises blood sugar higher and more quickly than any other nutrient. It is found in desserts, breads and cereals, and fruit. It's also found in starchy vegetables such as potatoes and corn, grains such as rice and pasta, and milk and yogurt. You can help keep your blood sugar levels within your target range by planning how much carbohydrate to have at meals and snacks. The amount you need depends on several things. These include your weight, how active you are, which diabetes medicines you take, and what your goals are for your blood sugar levels. A registered dietitian or diabetes educator can help you plan how much carbohydrate to include in each meal and snack. An example of a carbohydrate counting plan is:  · 45 to 60 grams at each meal. That's about the same as 3 to 4 carbohydrate servings. · 15 to 20 grams at each snack. That's about the same as 1 carbohydrate serving. The Nutrition Facts label on packaged foods tells you how much carbohydrate is in a serving of the food. First, look at the serving size on the food label. Is that the amount you eat in a serving? All of the nutrition information on a food label is based on that serving size. So if you eat more or less than that, you'll need to adjust the other numbers. Total carbohydrate is the next thing you need to look for on the label. If you count carbohydrate servings, one serving of carbohydrate is 15 grams. For foods that don't come with labels, such as fresh fruits and vegetables, you'll need a guide that lists carbohydrate in these foods. Ask your doctor, dietitian, or diabetes educator about books or other nutrition guides you can use.   If you take insulin, you need to know how many grams of carbohydrate are in a meal. This lets you know how much rapid-acting insulin to take before you eat. If you use an insulin pump, you get a constant rate of insulin during the day. So the pump must be programmed at meals to give you extra insulin to cover the rise in blood sugar after meals. When you know how much carbohydrate you will eat, you can take the right amount of insulin. Or, if you always use the same amount of insulin, you need to make sure that you eat the same amount of carbohydrate at meals. If you need more help to understand carbohydrate counting and food labels, ask your doctor, dietitian, or diabetes educator. How can you plan healthy meals? Here are some tips to get started:  · Plan your meals a week at a time. Don't forget to include snacks too. · Use cookbooks or online recipes to plan several main meals. Plan some quick meals for busy nights. You also can double some recipes that freeze well. Then you can save half for other busy nights when you don't have time to cook. · Make sure you have the ingredients you need for your recipes. If you're running low on basic items, put these items on your shopping list too. · List foods that you use to make breakfasts, lunches, and snacks. List plenty of fruits and vegetables. · Post this list on the refrigerator. Add to it as you think of more things you need. · Take the list to the store to do your weekly shopping. Follow-up care is a key part of your treatment and safety. Be sure to make and go to all appointments, and call your doctor if you are having problems. It's also a good idea to know your test results and keep a list of the medicines you take. Where can you learn more? Go to http://www.gray.com/  Enter R505 in the search box to learn more about \"Learning About Meal Planning for Diabetes. \"  Current as of: September 8, 2021               Content Version: 13.2  © 4302-0545 Healthwise, [employer].    Care instructions adapted under license by [employer] (which disclaims liability or warranty for this information). If you have questions about a medical condition or this instruction, always ask your healthcare professional. [address] any warranty or liability for your use of this information.

## 2022-05-31 NOTE — PROGRESS NOTES
1. \"Have you been to the ER, urgent care clinic since your last visit? Hospitalized since your last visit? \" No    2. \"Have you seen or consulted any other health care providers outside of the [address] [place] [address] since your last visit? \" No     3. For patients aged 39-70: Has the patient had a colonoscopy / FIT/ Cologuard? Yes - no Care Gap present      If the patient is female:    4. For patients aged 41-77: Has the patient had a mammogram within the past 2 years? NA - based on age or sex      11. For patients aged 21-65: Has the patient had a pap smear?  NA - based on age or sex

## 2022-05-31 NOTE — PROGRESS NOTES
HPI:  Elisa oLera is a 74 y.o. male who presents today with   Chief Complaint   Patient presents with    Diabetes        Pt has been well;     Patient did have some difficulty getting his blood work done. He was unaware he needed to have an appointment for blood tests . Labs have been reordered. Patient had a chronic care A1c done today at 7.2%. Patient is aware of the dietary indiscretions that may have contributed to his elevated blood test.  Patient is on Jardiance. Patient advised that medication can be increased but he is reluctant to do this before changing his diet. He states that the medication causes symptoms Tinea Cruris for which is Mycostatin. Patient better controlled sugars are the less likely he may break out with tinea cruris. Nonetheless, we will plan to first see if pt is able to correct his A1c with diet and exercise before increasing Jardiance     Pt is under that care of Cardiology for CAD. He denies any CP at this time. Needs a refill on metoprolol . Pt has hypercholesterolemia; he is on simvastatin      He has a h/o RCC.  Under the care of urology    3 most recent [place] [id number] Screens 5/31/2022   PHQ Not Done -   Little interest or pleasure in doing things Not at all   Feeling down, depressed, irritable, or hopeless Not at all   Total Score PHQ 2 0   Trouble falling or staying asleep, or sleeping too much -   Feeling tired or having little energy -   Poor appetite, weight loss, or overeating -   Feeling bad about yourself - or that you are a failure or have let yourself or your family down -   Trouble concentrating on things such as school, work, reading, or watching TV -   Moving or speaking so slowly that other people could have noticed; or the opposite being so fidgety that others notice -   Thoughts of being better off dead, or hurting yourself in some way -   PHQ 9 Score -               PMH,  Meds, Allergies, Family History, Social history reviewed      Current Outpatient Medications Medication Sig Dispense Refill    glucose blood VI test strips (FreeStyle Lite Strips) strip Blood sugar check daily. 100 Strip 3    ticagrelor (BRILINTA) 90 mg tablet Take 1 Tablet by mouth two (2) times a day. 180 Tablet 2    colchicine 0.6 mg tablet Take 2 tabs PO X 1 ; may repeat 1 tab PO in one hour X1 prn continued gout. 30 Tablet 1    diazePAM (Valium) 5 mg tablet Take 1 Tablet by mouth every twelve (12) hours as needed for Anxiety. Max Daily Amount: 10 mg. 30 Tablet 1    nystatin (MYCOSTATIN) powder Apply  to affected area four (4) times daily. 1 Each 1    aspirin 81 mg chewable tablet Take 81 mg by mouth daily.  metoprolol tartrate (LOPRESSOR) 25 mg tablet Take 1 Tablet by mouth two (2) times a day.  (Patient taking differently: Take 12.5 mg by mouth two (2) times a day.) 60 Tablet 6    simvastatin (ZOCOR) 20 mg tablet TAKE 1 TABLET NIGHTLY 90 Tablet 3    metFORMIN (GLUCOPHAGE) 500 mg tablet TAKE 2 TABLETS IN THE MORNING AND 2 TABLETS IN THE EVENING 360 Tablet 3    lisinopriL (PRINIVIL, ZESTRIL) 5 mg tablet TAKE 1 TABLET DAILY 90 Tablet 3    Jardiance 10 mg tablet TAKE 1 TABLET DAILY 90 Tablet 3    lancets (FreeStyle Lancets) 28 gauge misc USE TO CHECK BLOOD SUGAR DAILY 100 Lancet 4    SITagliptin (Januvia) 100 mg tablet TAKE 1 TABLET DAILY 90 Tablet 3        No Known Allergies               ROS  As per HPI      Visit Vitals  /68 (BP 1 Location: Left upper arm, BP Patient Position: Sitting)   Pulse 60   Temp 97.2 °F (36.2 °C) (Temporal)   Resp 18   Ht 6' (1.829 m)   Wt 207 lb (93.9 kg)   SpO2 97%   BMI 28.07 kg/m²     Physical Exam     General appearance: alert, cooperative, no distress, appears stated age  Neck: supple, symmetrical, trachea midline, no adenopathy, thyroid: not enlarged, symmetric, no tenderness/mass/nodules, no carotid bruit and no JVD  Lungs: clear to auscultation bilaterally  Heart: regular rate and rhythm, S1, S2 normal, no murmur, click, rub or gallop    Extremities: extremities normal, atraumatic, no cyanosis or edema        Lab Results   Component Value Date/Time    Hemoglobin A1c 7.0 (H) 12/13/2021 10:29 AM    Hemoglobin A1c (POC) 7.6 05/31/2022 10:30 AM     Lab Results   Component Value Date/Time    Sodium 139 01/05/2022 02:05 PM    Potassium 4.1 01/05/2022 02:05 PM    Chloride 104 01/05/2022 02:05 PM    CO2 29 01/05/2022 02:05 PM    Anion gap 6 01/05/2022 02:05 PM    Glucose 224 (H) 01/05/2022 02:05 PM    BUN 16 01/05/2022 02:05 PM    Creatinine 1.15 01/05/2022 02:05 PM    BUN/Creatinine ratio 14 01/05/2022 02:05 PM    GFR est AA >60 01/05/2022 02:05 PM    GFR est non-AA >60 01/05/2022 02:05 PM    Calcium 9.2 01/05/2022 02:05 PM    Bilirubin, total 0.3 01/05/2022 02:05 PM    Alk. phosphatase 66 01/05/2022 02:05 PM    Protein, total 7.4 01/05/2022 02:05 PM    Albumin 4.0 01/05/2022 02:05 PM    Globulin 3.4 01/05/2022 02:05 PM    A-G Ratio 1.2 01/05/2022 02:05 PM    ALT (SGPT) 22 01/05/2022 02:05 PM    AST (SGOT) 12 01/05/2022 02:05 PM     Lab Results   Component Value Date/Time    Cholesterol, total 141 12/13/2021 10:29 AM    Cholesterol (POC) 136 06/18/2013 08:30 AM    HDL Cholesterol 41 12/13/2021 10:29 AM    HDL Cholesterol (POC) 35 06/18/2013 08:30 AM    LDL Cholesterol (POC) 58 06/18/2013 08:30 AM    LDL, calculated 44.8 12/13/2021 10:29 AM    VLDL, calculated 55.2 12/13/2021 10:29 AM    Triglyceride 276 (H) 12/13/2021 10:29 AM    Triglycerides (POC) 219 06/18/2013 08:30 AM    CHOL/HDL Ratio 3.4 12/13/2021 10:29 AM       Assessment/Plan:    Diagnoses and all orders for this visit:    1. Type 2 diabetes mellitus with hyperglycemia, without long-term current use of insulin (HCC)  -     AMB POC HEMOGLOBIN A1C  -     LIPID PANEL; Future  -     METABOLIC PANEL, COMPREHENSIVE; Future  -     HEMOGLOBIN A1C WITH EAG; Future    2. Hypercholesterolemia    3.  Renal cell carcinoma of left kidney [facility])  Assessment & Plan:   monitored by specialist. No acute findings meriting change in the plan      Other orders  -     metoprolol tartrate (LOPRESSOR) 25 mg tablet; Take 0.5 Tablets by mouth two (2) times a day. as above  Pt clinically stable   treatment plan as listed below  Orders Placed This Encounter    LIPID PANEL    METABOLIC PANEL, COMPREHENSIVE    HEMOGLOBIN A1C WITH EAG    AMB POC HEMOGLOBIN A1C    metoprolol tartrate (LOPRESSOR) 25 mg tablet     Labs as ordered  Refilled metoprolol  Strict diabetic diet,  Consider increasing Jardiance if A1c remain elevated at next check. Lab orders for labs to be drawn prior to next visit palced. Follow-up and Dispositions    · Return in about 3 months (around 8/31/2022) for diabetes, htn. This has been fully explained to the patient, who indicates understanding. An After Visit Summary was printed and given to the patient.             Celi Lee MD

## 2022-06-01 ENCOUNTER — APPOINTMENT (OUTPATIENT)
Dept: CARDIAC REHAB | Age: 67
End: 2022-06-01
Payer: MEDICARE

## 2022-06-01 ENCOUNTER — OFFICE VISIT (OUTPATIENT)
Dept: CARDIOLOGY CLINIC | Age: 67
End: 2022-06-01
Payer: MEDICARE

## 2022-06-01 VITALS
HEART RATE: 58 BPM | SYSTOLIC BLOOD PRESSURE: 107 MMHG | HEIGHT: 72 IN | BODY MASS INDEX: 28.04 KG/M2 | DIASTOLIC BLOOD PRESSURE: 62 MMHG | WEIGHT: 207 LBS

## 2022-06-01 DIAGNOSIS — Z85.528 HISTORY OF RENAL CELL CANCER: ICD-10-CM

## 2022-06-01 DIAGNOSIS — E11.9 TYPE 2 DIABETES MELLITUS WITHOUT COMPLICATION, UNSPECIFIED WHETHER LONG TERM INSULIN USE (HCC): ICD-10-CM

## 2022-06-01 DIAGNOSIS — Z95.5 STATUS POST INSERTION OF DRUG ELUTING CORONARY ARTERY STENT: ICD-10-CM

## 2022-06-01 DIAGNOSIS — Z90.5 HISTORY OF PARTIAL NEPHRECTOMY: ICD-10-CM

## 2022-06-01 DIAGNOSIS — E78.5 DYSLIPIDEMIA: ICD-10-CM

## 2022-06-01 DIAGNOSIS — I25.10 CORONARY ARTERY DISEASE INVOLVING NATIVE CORONARY ARTERY OF NATIVE HEART WITHOUT ANGINA PECTORIS: Primary | ICD-10-CM

## 2022-06-01 PROCEDURE — G8432 DEP SCR NOT DOC, RNG: HCPCS | Performed by: INTERNAL MEDICINE

## 2022-06-01 PROCEDURE — 1101F PT FALLS ASSESS-DOCD LE1/YR: CPT | Performed by: INTERNAL MEDICINE

## 2022-06-01 PROCEDURE — 3051F HG A1C>EQUAL 7.0%<8.0%: CPT | Performed by: INTERNAL MEDICINE

## 2022-06-01 PROCEDURE — 99214 OFFICE O/P EST MOD 30 MIN: CPT | Performed by: INTERNAL MEDICINE

## 2022-06-01 PROCEDURE — G8427 DOCREV CUR MEDS BY ELIG CLIN: HCPCS | Performed by: INTERNAL MEDICINE

## 2022-06-01 PROCEDURE — 2022F DILAT RTA XM EVC RTNOPTHY: CPT | Performed by: INTERNAL MEDICINE

## 2022-06-01 PROCEDURE — G8417 CALC BMI ABV UP PARAM F/U: HCPCS | Performed by: INTERNAL MEDICINE

## 2022-06-01 PROCEDURE — 1123F ACP DISCUSS/DSCN MKR DOCD: CPT | Performed by: INTERNAL MEDICINE

## 2022-06-01 PROCEDURE — G8536 NO DOC ELDER MAL SCRN: HCPCS | Performed by: INTERNAL MEDICINE

## 2022-06-01 PROCEDURE — 3017F COLORECTAL CA SCREEN DOC REV: CPT | Performed by: INTERNAL MEDICINE

## 2022-06-01 NOTE — PROGRESS NOTES
HISTORY OF PRESENT ILLNESS  Laquita Swanson is a 74 y.o. male. Follow-up of CAD, old PCI, hyperlipidemia  History of diabetes. History of left renal cancer status post partial nephrectomy 2015    Follow-up  Pertinent negatives include no chest pain, no headaches and no shortness of breath. Review of Systems   Constitutional: Negative for chills, diaphoresis, fever, malaise/fatigue and weight loss. HENT: Negative for hearing loss and nosebleeds. Eyes: Negative for blurred vision and discharge. Respiratory: Negative for cough, shortness of breath, wheezing and stridor. Cardiovascular: Negative for chest pain, palpitations, orthopnea, claudication, leg swelling and PND. Gastrointestinal: Negative for diarrhea, heartburn, nausea and vomiting. Genitourinary: Negative for dysuria and hematuria. Musculoskeletal: Negative for joint pain and myalgias. Skin: Negative for rash. Neurological: Negative for dizziness, tingling, tremors, focal weakness, seizures, loss of consciousness, weakness and headaches. Endo/Heme/Allergies: Negative for polydipsia. Does not bruise/bleed easily. Psychiatric/Behavioral: Negative for depression, substance abuse and suicidal ideas. The patient does not have insomnia.       Family History   Problem Relation Age of Onset    Diabetes Mother     Dementia Maternal Grandmother    Major Olivia Brother        Past Medical History:   Diagnosis Date    BPH (benign prostatic hyperplasia) 12/9/2009    DDD (degenerative disc disease), lumbar 12/9/2009    Diverticulosis 12/9/2009    DM (diabetes mellitus) ([facility] 12/9/2009    History of colon polyps 4/5/2016    Hypercholesterolemia     Pseudogout 12/9/2009    Renal cell carcinoma of left kidney ([facility] 6/19/15    Pathological Stage [id number] pap RCC FG2 s/p left open partial nephrectomy in 6/15     Right shoulder pain 12/9/2009       Past Surgical History:   Procedure Laterality Date    ENDOSCOPY, COLON, DIAGNOSTIC  2000 sigmoidectomy    HX NEPHRECTOMY Left 6/19/15    Partial, Dr. Silvia [facility], [facility]    HX ORTHOPAEDIC  2005    left rotator cuff    HX OTHER SURGICAL  2007    hydrocele    HX TONSILLECTOMY         Social History     Tobacco Use    Smoking status: Former Smoker     Packs/day: 1.00     Years: 40.00     Pack years: 40.00     Types: Cigarettes     Quit date: 2010     Years since quittin.3    Smokeless tobacco: Never Used   Substance Use Topics    Alcohol use: Not Currently     Comment: occ       No Known Allergies    Outpatient Medications Marked as Taking for the 22 encounter (Office Visit) with Raphael Giron MD   Medication Sig Dispense Refill    empagliflozin (Jardiance) 10 mg tablet Take 10 mg by mouth daily.  metoprolol tartrate (LOPRESSOR) 25 mg tablet Take 0.5 Tablets by mouth two (2) times a day. 90 Tablet 3    glucose blood VI test strips (FreeStyle Lite Strips) strip Blood sugar check daily. 100 Strip 3    ticagrelor (BRILINTA) 90 mg tablet Take 1 Tablet by mouth two (2) times a day. 180 Tablet 2    colchicine 0.6 mg tablet Take 2 tabs PO X 1 ; may repeat 1 tab PO in one hour X1 prn continued gout. 30 Tablet 1    diazePAM (Valium) 5 mg tablet Take 1 Tablet by mouth every twelve (12) hours as needed for Anxiety. Max Daily Amount: 10 mg. 30 Tablet 1    nystatin (MYCOSTATIN) powder Apply  to affected area four (4) times daily. 1 Each 1    aspirin 81 mg chewable tablet Take 81 mg by mouth daily.  simvastatin (ZOCOR) 20 mg tablet TAKE 1 TABLET NIGHTLY 90 Tablet 3    metFORMIN (GLUCOPHAGE) 500 mg tablet TAKE 2 TABLETS IN THE MORNING AND 2 TABLETS IN THE EVENING (Patient taking differently: Take 1,000 mg by mouth two (2) times daily (with meals). ) 360 Tablet 3    lisinopriL (PRINIVIL, ZESTRIL) 5 mg tablet TAKE 1 TABLET DAILY 90 Tablet 3    lancets (FreeStyle Lancets) 28 gauge misc USE TO CHECK BLOOD SUGAR DAILY 100 Lancet 4    SITagliptin (Januvia) 100 mg tablet TAKE 1 TABLET DAILY 90 Tablet 3        Visit Vitals  /62 (BP 1 Location: Left upper arm, BP Patient Position: Sitting, BP Cuff Size: Adult)   Pulse (!) 58   Ht 6' (1.829 m)   Wt 93.9 kg (207 lb)   BMI 28.07 kg/m²       Physical Exam  Constitutional:       Appearance: He is well-developed. HENT:      Head: Normocephalic and atraumatic. Eyes:      General: No scleral icterus. Conjunctiva/sclera: Conjunctivae normal.   Neck:      Vascular: No JVD. Cardiovascular:      Rate and Rhythm: Normal rate and regular rhythm. Heart sounds: Normal heart sounds. No murmur heard. No friction rub. No gallop. Pulmonary:      Effort: Pulmonary effort is normal. No respiratory distress. Breath sounds: Normal breath sounds. No stridor. No wheezing or rales. Chest:      Chest wall: No tenderness. Abdominal:      General: There is no distension. Tenderness: There is no abdominal tenderness. Musculoskeletal:         General: No tenderness. Normal range of motion. Cervical back: Normal range of motion and neck supple. Right lower leg: No edema. Left lower leg: No edema. Lymphadenopathy:      Cervical: No cervical adenopathy. Skin:     Findings: No erythema or rash. Neurological:      Mental Status: He is alert and oriented to person, place, and time. Cranial Nerves: No cranial nerve deficit. Psychiatric:         Behavior: Behavior normal.     ekg sinus rhythm with no acute st-t changes    01/03/22    NUCLEAR CARDIAC STRESS TEST 01/03/2022 1/4/2022    Interpretation Summary    Nuclear Findings: LVEF measures 54%. TID ratio is 0.99.    Nuclear Findings: There is a mild severity left ventricular stress perfusion defect. There is normal wall motion in the defect area. The defect appears to be an artifact caused by subdiaphragmatic activity. Perfusion defect was visually and quantitatively present.   Nuclear Findings:  The study is most consistent with artifact but cannot rule out a small degree of myocardial ischemia. Findings suggest a low risk of myocardial ischemia.   ECG: Resting ECG demonstrates normal sinus rhythm.   Stress Test: A Amaury protocol stress test was performed. EKG portion revealed 2 mm st segment depression in inferolateral leads  Recommend clinical correlation. Signed by: Yahaira Samano MD on 1/3/2022  7:10 PM  01/10/22    CARDIAC PROCEDURE 01/10/2022 1/10/2022    Conclusion  Critical branch vessel stenosis ( mid D1 90-95%) with preserved LV function. S/p ptca/stent ( TOBY). Lesion reduced to 0%. Continue DAPT/ intense risk factor modification. Signed by: Yahaira Samano MD on 1/10/2022  9:57 AM      ASSESSMENT and PLAN     Results for Matt Asencio (MRN [id number]) as of 6/1/2022 11:15   Ref. Range 8/10/2021 08:59 12/13/2021 10:29   Triglyceride Latest Ref Range: <150 MG/ (H) 276 (H)   Cholesterol, total Latest Ref Range: <200 MG/ 141   HDL Cholesterol Latest Ref Range: 40 - 60 MG/DL 50 41   CHOL/HDL Ratio Latest Ref Range: 0 - 5.0   3.1 3.4   VLDL, calculated Latest Units: MG/DL 30.6 55.2   LDL, calculated Latest Ref Range: 0 - 100 MG/DL 76.4 44.8   Hemoglobin A1c, (calculated) Latest Ref Range: 4.2 - 5.6 % 6.4 (H) 7.0 (H)     current treatment plan is effective, no change in therapy  cardiovascular risk and specific lipid/LDL goals reviewed  use of aspirin to prevent MI and TIA's discussed. Patient is a 82-year-old male with history of diabetes, dyslipidemia seen for recurrent episodes of left-sided chest tightness occurring both at rest and exertion lasting few minutes. Also reports intermittent upper back discomfort. CCS 3. Reports exertional dyspnea at moderate activity. No orthopnea or PND. Underwent recent stress test which revealed normal SPECT imaging however found to have 2 mm ST segment depression in inferolateral leads at peak exercise.     1/22 Subsequently underwent coronary angiogram which revealed high-grade stenosis in diagonal 1 artery. Status post PCI using drug-eluting stent. Seen for follow-up. Right radial artery intact with no ecchymosis or hematoma. Chest pain has resolved. Currently on dual antiplatelet. Advised to decrease the metoprolol dose to 12.5 mg twice daily. LDL on target. Continue current medications and follow-up in 4 months. Diagnoses and all orders for this visit:    1. Coronary artery disease involving native coronary artery of native heart without angina pectoris  -     ticagrelor (BRILINTA) 90 mg tablet; Take 1 Tablet by mouth two (2) times a day. 2. Dyslipidemia  -     LIPID PANEL; Future  -     HEPATIC FUNCTION PANEL; Future    3. Status post insertion of drug eluting coronary artery stent    4. Type 2 diabetes mellitus without complication, unspecified whether long term insulin use ([facility] [address]    5. History of renal cell cancer    6. History of partial nephrectomy  Comments:  2015        Pertinent laboratory and test data reviewed and discussed with patient. See patient instructions also for other medical advice given    Medications Discontinued During This Encounter   Medication Reason    ticagrelor (BRILINTA) 90 mg tablet REORDER       Follow-up and Dispositions    · Return in about 6 months (around 12/1/2022), or if symptoms worsen or fail to improve, for post test.       6/1/2022 CAD stable. Lipids are not controlled. Check mammogram and consider adjusting the medications as needed. Detailed discussion on cholesterol diet exercise and weight loss. He has lost some weight already and is trying to lose more. Mediterranean diet guidelines given.

## 2022-06-01 NOTE — PROGRESS NOTES
1. Have you been to the ER, urgent care clinic since your last visit? Hospitalized since your last visit? No    2. Have you seen or consulted any other health care providers outside of the [address] [place] [address] since your last visit? Include any pap smears or colon screening. No     3. Since your last visit, have you had any of the following symptoms? None    4. Have you had any blood work, X-rays or cardiac testing? Yes Where: PCP     Requested: YES     In [facility]: NO    5. Where do you normally have your labs drawn? [facility]    6. Do you need any refills today?    Yes

## 2022-06-01 NOTE — PATIENT INSTRUCTIONS
Medications Discontinued During This Encounter   Medication Reason    ticagrelor (BRILINTA) 90 mg tablet REORDER        Learning About the Mediterranean Diet  What is the Mediterranean diet? The Mediterranean diet is a style of eating rather than a diet plan. It features foods eaten in [place], [place], [place] and [place], and other countries along the [facility]. It emphasizes eating foods like fish, fruits, vegetables, beans, high-fiber breads and whole grains, nuts, and olive oil. This style of eating includes limited red meat, cheese, and sweets. Why choose the Mediterranean diet? A Mediterranean-style diet may improve heart health. It contains more fat than other heart-healthy diets. But the fats are mainly from nuts, unsaturated oils (such as fish oils and olive oil), and certain nut or seed oils (such as canola, soybean, or flaxseed oil). These fats may help protect the heart and blood vessels. How can you get started on the Mediterranean diet? Here are some things you can do to switch to a more Mediterranean way of eating. What to eat  · Eat a variety of fruits and vegetables each day, such as grapes, blueberries, tomatoes, broccoli, peppers, figs, olives, spinach, eggplant, beans, lentils, and chickpeas. · Eat a variety of whole-grain foods each day, such as oats, brown rice, and whole wheat bread, pasta, and couscous. · Eat fish at least 2 times a week. Try tuna, salmon, mackerel, lake trout, herring, or sardines. · Eat moderate amounts of low-fat dairy products, such as milk, cheese, or yogurt. · Eat moderate amounts of poultry and eggs. · Choose healthy (unsaturated) fats, such as nuts, olive oil, and certain nut or seed oils like canola, soybean, and flaxseed. · Limit unhealthy (saturated) fats, such as butter, palm oil, and coconut oil. And limit fats found in animal products, such as meat and dairy products made with whole milk.  Try to eat red meat only a few times a month in very small amounts. · Limit sweets and desserts to only a few times a week. This includes sugar-sweetened drinks like soda. The Mediterranean diet may also include red wine with your meal--1 glass each day for women and up to 2 glasses a day for men. Tips for eating at home  · Use herbs, spices, garlic, lemon zest, and citrus juice instead of salt to add flavor to foods. · Add avocado slices to your sandwich instead of merchant. · Have fish for lunch or dinner instead of red meat. Brush the fish with olive oil, and broil or grill it. · Sprinkle your salad with seeds or nuts instead of cheese. · Cook with olive or canola oil instead of butter or oils that are high in saturated fat. · Switch from 2% milk or whole milk to 1% or fat-free milk. · Dip raw vegetables in a vinaigrette dressing or hummus instead of dips made from mayonnaise or sour cream.  · Have a piece of fruit for dessert instead of a piece of cake. Try baked apples, or have some dried fruit. Tips for eating out  · Try broiled, grilled, baked, or poached fish instead of having it fried or breaded. · Ask your  to have your meals prepared with olive oil instead of butter. · Order dishes made with marinara sauce or sauces made from olive oil. Avoid sauces made from cream or mayonnaise. · Choose whole-grain breads, whole wheat pasta and pizza crust, brown rice, beans, and lentils. · Cut back on butter or margarine on bread. Instead, you can dip your bread in a small amount of olive oil. · Ask for a side salad or grilled vegetables instead of french fries or chips. Where can you learn more? Go to http://www.[employer].com/  Enter O407 in the search box to learn more about \"Learning About the Mediterranean Diet. \"  Current as of: September 8, 2021               Content Version: 13.2  © 6601-7529 [employer]wise[employer].    Care instructions adapted under license by [employer] (which disclaims liability or warranty for this information). If you have questions about a medical condition or this instruction, always ask your healthcare professional. [place] [address] any warranty or liability for your use of this information.

## 2022-06-02 ENCOUNTER — HOSPITAL ENCOUNTER (OUTPATIENT)
Dept: CARDIAC REHAB | Age: 67
Discharge: HOME | End: 2022-06-02
Payer: MEDICARE

## 2022-06-02 VITALS — WEIGHT: 205 LBS | BODY MASS INDEX: 27.8 KG/M2

## 2022-06-02 PROCEDURE — 93798 PHYS/QHP OP CAR RHAB W/ECG: CPT

## 2022-06-02 PROCEDURE — 93797 PHYS/QHP OP CAR RHAB WO ECG: CPT

## 2022-06-02 NOTE — PROGRESS NOTES
Mr. Lizbet Johnson is doing well. Completed Medication eduction film. Verbalized understanding of content.

## 2022-06-03 ENCOUNTER — HOSPITAL ENCOUNTER (OUTPATIENT)
Dept: CARDIAC REHAB | Age: 67
Discharge: HOME | End: 2022-06-03
Payer: MEDICARE

## 2022-06-03 ENCOUNTER — APPOINTMENT (OUTPATIENT)
Dept: CARDIAC REHAB | Age: 67
End: 2022-06-03
Payer: MEDICARE

## 2022-06-03 VITALS — WEIGHT: 204 LBS | BODY MASS INDEX: 27.67 KG/M2

## 2022-06-03 PROCEDURE — 93798 PHYS/QHP OP CAR RHAB W/ECG: CPT

## 2022-06-06 ENCOUNTER — HOSPITAL ENCOUNTER (OUTPATIENT)
Dept: CARDIAC REHAB | Age: 67
Discharge: HOME | End: 2022-06-06
Payer: MEDICARE

## 2022-06-06 VITALS — WEIGHT: 205 LBS | BODY MASS INDEX: 27.8 KG/M2

## 2022-06-06 PROCEDURE — 93798 PHYS/QHP OP CAR RHAB W/ECG: CPT

## 2022-06-06 NOTE — PROGRESS NOTES
CARDIAC REHAB DISCHARGE NOTE FOR REVIEW AND SIGNATURE      Patient: Lukasz Celeste (45 y.o. male)  Date: 6/6/2022  Primary Diagnosis: Atherosclerotic heart disease of native coronary artery without angina pectoris [I25.10]      Mr. Nellene Kayser has completed Phase II of Cardiac Rehab; having attended 36/36 sessions from 2/18-6/6/22    Patient is interested in maintaining optimal health and will work with Kirti Mckay referring physician. He has improved endurance and stamina through regular exercise during the program. Weight loss since [facility] is 3 lbs and gained 1 inch in the waist. Blood pressure at discharge is 111/69 and is WNL. Patient's [place] level has improved. PHQ-9 Depression Inventory score has improved. Pt's MET level increased from 3 to 5 METs during 6-Minute Walk Test. These scores/results have been reviewed with patient. Patient is progressing and has met his re-certification goals. Patient plans to continue exercising upon discharge (at home, gym, etc). Patient education completed in collaboration with RN/EP for revised goals upon discharge, to include: recommended cardio equipment; light weights; and walking 3-5 x week. Discharge Recommendations:   Continue your hard work and dedication to making a lifestyle change and improving your quality of life. Keep up the good work!       Thank you for this referral.  Ritu Ferraro RN 6/6/2022       Cardiac ITP     CR PHASE II from 6/6/2022 in [facility] 1634   Treatment Diagnosis    Treatment Diagnosis 1 PCI   PCI Date 01/10/22   Referral Date 02/04/22   Co-morbidities Diabetes   Individual Treatment Plan    ITP Visit Type Re-Assessment   1st Date of Exercise  02/08/22   ITP Next Review Date 06/10/22   Visit #/Total Visits 36/36   EF % 54 %  [no recent echo performed]   Risk Stratification Low   ITP Exercise, Psychosocial, Tobacco, Nutrition, Education   Exercise     Stages of Change Maintenance   DASI Total Score --   Assisted Devices None   Exercise Prescription    Mode Treadmill, Bike, Stepper, Ergometer   Frequency per week 2-3   Duration per session 35-45   Intensity  METS       4.5-5.5   RPE 11-13   Target Heart Rate    Resistance Training Yes   Exercise Blood Pressures    Resting /69   Peak /67   Is BP WDL? Yes   Exercise Activity at Home    Type walking    Frequency 7 days a week   Resistance Training No   Exercise Education    Education Exercise safety, Signs/Symptoms to report, RPE scale, Equipment orientation   Exercise Target Goal    Target Goal(s) Individual exercise RX, BP < 140/90 or < 130/80, if DM or CKD, Aerobic activity 30 + minutes/day  5 days/week   Psychosocial    Stages of Change Maintenance   [facility] Total Score --   PHQ 9 Score --   Psychosocial Intervention    Medication Changes No   Psychosocial Education    Education Coping techniques, Environmental triggers, Impact self care behaviors on health, Signs/Symptoms of depression, Stress management class   Psychosocial Target Goals    Target Goal(s) Assess presence or absence of depression using a valid screening tool, Demonstrates appropriate interaction with others, Engages in self-care behaviors, Maximizes coping skills, Positive support group   Nutrition    Stages of Change Maintenance   Diabetes Yes   HbA1C 6.2   BG at Home Yes   BG Frequency 1x/day   BG in Range?  Yes   Random BG     Lipids    Date Lipids Drawn 01/05/22   Total 141   HDL 41   LDL 44.8   Triglycerides 27.6   Lipid Med(s) Zocor   Lipid Med Change(s) No   Weight Management    Weight  93 kg (205 lb)   Height  6' (1.829 m)   BMI 27.86   Waist Circumference  41\"   Alcohol Special   Type moonshine   Rate Your Plate Total Score --   Nutrition Intervention    Dietitian Consult No   Nurse/Patient Discussion Yes   Nutrition Goals --  [decrease pastries, continue green vegetables, education]   Nutrition Class No   Diabetes Education Referral No   Lipid Clinic Referral No   Weight Management Referral No Nutrition Education    Nutrition Target Goals    Target Goals BMI less than 25, HbA1C less than 7 percent, Waist size less than 40 inches males and less than 35 inches for females   Patient Stated Nutrition Goals cut back pastries,, continue green vegetables   Education    Learning Barrier Ready to learn   Education Intervention    Education Schedule Given Yes   Patient Education     Education CAD, Risk factors, Med Compliance, Cardiac A&P, Signs/Symptoms of Angina   Hypertension No   Hypertension Controlled Yes   Is BP WDL?  Yes   Med(s) Change No   BP Meds Lisinopril, Metoprolol   Education Target Goals    Target Goals Medication compliance, Risk factors, Understand target guidelines for lipids, Understand target guidelines for B/P   Physician Response        Exercise     CR PHASE II from 6/6/2022 in [address] Common Questions    Any problems changes since your last visit Denies   Any symptoms while exercising denies   Psychosocial/Stress Level 0   Resting EKG rhythm --   Tobacco Use None   ITP Next Review Date 06/10/22   Visit Number/Total Visits 36/36   On Call Medical Director Immediately Available [place]   Exercise Treatment Log    Target Heart Rate(Range)    Resting HR 70   Resting /69   Recovery HR 72   Recovery /78   Weight 93 kg (205 lb)   Exercise EKG Rhythm SR/ST   Exercise Duration 45   Peak    Peak RPE 14   Peak Mets 4.5   Asymptomatic yes   Total Minutes 55

## 2022-06-08 ENCOUNTER — APPOINTMENT (OUTPATIENT)
Dept: CARDIAC REHAB | Age: 67
End: 2022-06-08
Payer: MEDICARE

## 2022-06-10 ENCOUNTER — HOSPITAL ENCOUNTER (OUTPATIENT)
Dept: ULTRASOUND IMAGING | Age: 67
Discharge: HOME | End: 2022-06-10
Attending: UROLOGY
Payer: MEDICARE

## 2022-06-10 ENCOUNTER — HOSPITAL ENCOUNTER (OUTPATIENT)
Dept: GENERAL RADIOLOGY | Age: 67
Discharge: HOME | End: 2022-06-10
Attending: UROLOGY
Payer: MEDICARE

## 2022-06-10 ENCOUNTER — APPOINTMENT (OUTPATIENT)
Dept: CARDIAC REHAB | Age: 67
End: 2022-06-10
Payer: MEDICARE

## 2022-06-10 DIAGNOSIS — C64.2 RENAL CELL CARCINOMA OF LEFT KIDNEY (HCC): ICD-10-CM

## 2022-06-10 DIAGNOSIS — N28.1 RENAL CYST: ICD-10-CM

## 2022-06-10 DIAGNOSIS — C64.9 RENAL CELL CARCINOMA, UNSPECIFIED LATERALITY (HCC): ICD-10-CM

## 2022-06-10 PROCEDURE — 76770 US EXAM ABDO BACK WALL COMP: CPT

## 2022-06-10 PROCEDURE — 71046 X-RAY EXAM CHEST 2 VIEWS: CPT

## 2022-06-13 ENCOUNTER — APPOINTMENT (OUTPATIENT)
Dept: CARDIAC REHAB | Age: 67
End: 2022-06-13
Payer: MEDICARE

## 2022-06-14 DIAGNOSIS — E11.65 TYPE 2 DIABETES MELLITUS WITH HYPERGLYCEMIA, WITHOUT LONG-TERM CURRENT USE OF INSULIN (HCC): ICD-10-CM

## 2022-06-15 ENCOUNTER — APPOINTMENT (OUTPATIENT)
Dept: CARDIAC REHAB | Age: 67
End: 2022-06-15
Payer: MEDICARE

## 2022-07-22 NOTE — TELEPHONE ENCOUNTER
Last Visit: 5/31/22 with MD Clarence Barger  Next Appointment: 9/6/22 with MD Clarence Barger  Previous Refill Encounter(s): 1/20/20 #90 with 1 refill    Requested Prescriptions     Pending Prescriptions Disp Refills    empagliflozin (Jardiance) 10 mg tablet 90 Tablet 1     Sig: Take 1 Tablet by mouth in the morning. For Rodrick Blanco in place:   Recommendation Provided To:    Intervention Detail: New Rx: 1, reason: Patient Preference  Gap Closed?:   Intervention Accepted By:   Time Spent (min): 5

## 2022-09-03 ENCOUNTER — HOSPITAL ENCOUNTER (OUTPATIENT)
Dept: LAB | Age: 67
Discharge: HOME | End: 2022-09-03
Payer: MEDICARE

## 2022-09-03 DIAGNOSIS — E11.65 TYPE 2 DIABETES MELLITUS WITH HYPERGLYCEMIA, WITHOUT LONG-TERM CURRENT USE OF INSULIN (HCC): ICD-10-CM

## 2022-09-03 LAB
ALBUMIN SERPL-MCNC: 3.7 G/DL (ref 3.4–5)
ALBUMIN/GLOB SERPL: 1.2 {RATIO} (ref 0.8–1.7)
ALP SERPL-CCNC: 64 U/L (ref 45–117)
ALT SERPL-CCNC: 23 U/L (ref 16–61)
ANION GAP SERPL CALC-SCNC: 6 MMOL/L (ref 3–18)
AST SERPL-CCNC: 19 U/L (ref 10–38)
BILIRUB SERPL-MCNC: 0.4 MG/DL (ref 0.2–1)
BUN SERPL-MCNC: 15 MG/DL (ref 7–18)
BUN/CREAT SERPL: 15 (ref 12–20)
CALCIUM SERPL-MCNC: 8.6 MG/DL (ref 8.5–10.1)
CHLORIDE SERPL-SCNC: 107 MMOL/L (ref 100–111)
CHOLEST SERPL-MCNC: 135 MG/DL
CO2 SERPL-SCNC: 25 MMOL/L (ref 21–32)
CREAT SERPL-MCNC: 1.02 MG/DL (ref 0.6–1.3)
EST. AVERAGE GLUCOSE BLD GHB EST-MCNC: 163 MG/DL
GLOBULIN SER CALC-MCNC: 3.1 G/DL (ref 2–4)
GLUCOSE SERPL-MCNC: 205 MG/DL (ref 74–99)
HBA1C MFR BLD: 7.3 % (ref 4.2–5.6)
HDLC SERPL-MCNC: 36 MG/DL (ref 40–60)
HDLC SERPL: 3.8 {RATIO} (ref 0–5)
LDLC SERPL CALC-MCNC: ABNORMAL MG/DL (ref 0–100)
LIPID PROFILE,FLP: ABNORMAL
POTASSIUM SERPL-SCNC: 4 MMOL/L (ref 3.5–5.5)
PROT SERPL-MCNC: 6.8 G/DL (ref 6.4–8.2)
SODIUM SERPL-SCNC: 138 MMOL/L (ref 136–145)
TRIGL SERPL-MCNC: 458 MG/DL (ref ?–150)
VLDLC SERPL CALC-MCNC: ABNORMAL MG/DL

## 2022-09-03 PROCEDURE — 83036 HEMOGLOBIN GLYCOSYLATED A1C: CPT

## 2022-09-03 PROCEDURE — 80053 COMPREHEN METABOLIC PANEL: CPT

## 2022-09-03 PROCEDURE — 80061 LIPID PANEL: CPT

## 2022-09-03 PROCEDURE — 36415 COLL VENOUS BLD VENIPUNCTURE: CPT

## 2022-09-06 ENCOUNTER — HOSPITAL ENCOUNTER (OUTPATIENT)
Dept: LAB | Age: 67
Discharge: HOME | End: 2022-09-06
Payer: MEDICARE

## 2022-09-06 ENCOUNTER — OFFICE VISIT (OUTPATIENT)
Dept: FAMILY MEDICINE CLINIC | Age: 67
End: 2022-09-06
Payer: MEDICARE

## 2022-09-06 VITALS
DIASTOLIC BLOOD PRESSURE: 62 MMHG | WEIGHT: 206 LBS | HEIGHT: 72 IN | OXYGEN SATURATION: 97 % | TEMPERATURE: 97.9 F | RESPIRATION RATE: 18 BRPM | HEART RATE: 55 BPM | BODY MASS INDEX: 27.9 KG/M2 | SYSTOLIC BLOOD PRESSURE: 103 MMHG

## 2022-09-06 DIAGNOSIS — E78.00 HYPERCHOLESTEROLEMIA: ICD-10-CM

## 2022-09-06 DIAGNOSIS — Z00.00 WELCOME TO MEDICARE PREVENTIVE VISIT: Primary | ICD-10-CM

## 2022-09-06 DIAGNOSIS — I10 HTN (HYPERTENSION), BENIGN: ICD-10-CM

## 2022-09-06 DIAGNOSIS — E11.65 TYPE 2 DIABETES MELLITUS WITH HYPERGLYCEMIA, WITHOUT LONG-TERM CURRENT USE OF INSULIN (HCC): ICD-10-CM

## 2022-09-06 DIAGNOSIS — Z87.891 HISTORY OF TOBACCO ABUSE: ICD-10-CM

## 2022-09-06 LAB
CREAT UR-MCNC: 56 MG/DL (ref 30–125)
MICROALBUMIN UR-MCNC: 7.95 MG/DL (ref 0–3)
MICROALBUMIN/CREAT UR-RTO: 142 MG/G (ref 0–30)

## 2022-09-06 PROCEDURE — 1124F ACP DISCUSS-NO DSCNMKR DOCD: CPT | Performed by: FAMILY MEDICINE

## 2022-09-06 PROCEDURE — 93005 ELECTROCARDIOGRAM TRACING: CPT | Performed by: FAMILY MEDICINE

## 2022-09-06 PROCEDURE — G0403 EKG FOR INITIAL PREVENT EXAM: HCPCS | Performed by: FAMILY MEDICINE

## 2022-09-06 PROCEDURE — G8427 DOCREV CUR MEDS BY ELIG CLIN: HCPCS | Performed by: FAMILY MEDICINE

## 2022-09-06 PROCEDURE — G8536 NO DOC ELDER MAL SCRN: HCPCS | Performed by: FAMILY MEDICINE

## 2022-09-06 PROCEDURE — 82043 UR ALBUMIN QUANTITATIVE: CPT

## 2022-09-06 PROCEDURE — G8417 CALC BMI ABV UP PARAM F/U: HCPCS | Performed by: FAMILY MEDICINE

## 2022-09-06 PROCEDURE — G8510 SCR DEP NEG, NO PLAN REQD: HCPCS | Performed by: FAMILY MEDICINE

## 2022-09-06 PROCEDURE — 3051F HG A1C>EQUAL 7.0%<8.0%: CPT | Performed by: FAMILY MEDICINE

## 2022-09-06 PROCEDURE — G0402 INITIAL PREVENTIVE EXAM: HCPCS | Performed by: FAMILY MEDICINE

## 2022-09-06 NOTE — PROGRESS NOTES
This is a \"Welcome to [employer]"  Initial Preventive Physical Examination (IPPE) providing Personalized Prevention Plan Services (Performed in the first 12 months of enrollment)    I have reviewed the patient's medical history in detail and updated the computerized patient record. Patient has known coronary artery disease. He has high blood pressure, diabetes and hypercholesterolemia. Latest labs are as listed below.:    Lab Results   Component Value Date/Time    Cholesterol, total 135 09/03/2022 08:28 AM    Cholesterol (POC) 136 06/18/2013 08:30 AM    HDL Cholesterol 36 (L) 09/03/2022 08:28 AM    HDL Cholesterol (POC) 35 06/18/2013 08:30 AM    LDL Cholesterol (POC) 58 06/18/2013 08:30 AM    LDL, calculated  09/03/2022 08:28 AM     LDL AND VLDL CHOLESTEROL NOT CALCULATED WHEN TRIGLYCERIDES >400 MG/DL OR HDL CHOLESTEROL <20 MG/DL    VLDL, calculated  09/03/2022 08:28 AM     Calculation not valid with this patient's other Lipid values. Triglyceride 458 (H) 09/03/2022 08:28 AM    Triglycerides (POC) 219 06/18/2013 08:30 AM    CHOL/HDL Ratio 3.8 09/03/2022 08:28 AM     Lab Results   Component Value Date/Time    Hemoglobin A1c 7.3 (H) 09/03/2022 08:28 AM    Hemoglobin A1c (POC) 7.6 05/31/2022 10:30 AM     Lab Results   Component Value Date/Time    Sodium 138 09/03/2022 08:28 AM    Potassium 4.0 09/03/2022 08:28 AM    Chloride 107 09/03/2022 08:28 AM    CO2 25 09/03/2022 08:28 AM    Anion gap 6 09/03/2022 08:28 AM    Glucose 205 (H) 09/03/2022 08:28 AM    BUN 15 09/03/2022 08:28 AM    Creatinine 1.02 09/03/2022 08:28 AM    BUN/Creatinine ratio 15 09/03/2022 08:28 AM    GFR est AA >60 09/03/2022 08:28 AM    GFR est non-AA >60 09/03/2022 08:28 AM    Calcium 8.6 09/03/2022 08:28 AM    Bilirubin, total 0.4 09/03/2022 08:28 AM    Alk.  phosphatase 64 09/03/2022 08:28 AM    Protein, total 6.8 09/03/2022 08:28 AM    Albumin 3.7 09/03/2022 08:28 AM    Globulin 3.1 09/03/2022 08:28 AM    A-G Ratio 1.2 09/03/2022 08:28 AM ALT (SGPT) 23 09/03/2022 08:28 AM    AST (SGOT) 19 09/03/2022 08:28 AM     His A1c has improved. He has some neuropathic pain in his feet. His monofilament examination is within normal limits today. Patient does report some dietary indiscretions at times. Reviewed with patient the need to modify and possibly restrict some of his carbohydrates and fruit sugars to assist in blood sugar management. Patient declines any immunizations today    Patient does agree to AAA ultrasound given his history of tobacco use. Assessment/Plan   Education and counseling provided:  Are appropriate based on today's review and evaluation  End-of-Life planning (with patient's consent)    1. Welcome to Medicare preventive visit  -     AMB POC EKG ROUTINE W/ 12 LEADS, INTER & REP  -     US EXAM SCREENING AAA; Future  2. Type 2 diabetes mellitus with hyperglycemia, without long-term current use of insulin (HCC)  -     METABOLIC PANEL, COMPREHENSIVE; Future  -     HEMOGLOBIN A1C WITH EAG; Future  -     MICROALBUMIN, UR, RAND W/ MICROALB/CREAT RATIO; Future  3. Hypercholesterolemia  -     LIPID PANEL; Future  4. HTN (hypertension), benign  5. History of tobacco abuse       As above  Patient clinically stable  Discussed considering strength training with light weights to further control his sugars, blood pressures and cholesterol. A1c was 7.3% today  Will increase patient's Jardiance to 25 mg daily  Follow-up and Dispositions    Return in about 4 months (around 1/6/2023) for htn/chol/DM. This has been fully explained to the patient, who indicates understanding. An After Visit Summary was printed and given to the patient.     Depression Risk Screen     3 most recent PHQ Screens 9/6/2022   PHQ Not Done -   Little interest or pleasure in doing things Not at all   Feeling down, depressed, irritable, or hopeless Not at all   Total Score PHQ 2 0   Trouble falling or staying asleep, or sleeping too much -   Feeling tired or having little energy -   Poor appetite, weight loss, or overeating -   Feeling bad about yourself - or that you are a failure or have let yourself or your family down -   Trouble concentrating on things such as school, work, reading, or watching TV -   Moving or speaking so slowly that other people could have noticed; or the opposite being so fidgety that others notice -   Thoughts of being better off dead, or hurting yourself in some way -   PHQ 9 Score -       Alcohol & Drug Abuse Risk Screen    Do you average more than 1 drink per night or more than 7 drinks a week: No    In the past three months have you have had more than 4 drinks containing alcohol on one occasion: No          Functional Ability and Level of Safety    Diet: Diabetic diet      Hearing: Has always been [place] per pt report      Vision Screening:  Vision is good. Recently done  No results found. Activities of Daily Living: The home contains: no safety equipment. Patient does total self care   Uses handrails in stairs   Ambulation:  has felt unsteady since his stent was placed. Exercise level: moderately active     Fall Risk Screen:  Fall Risk Assessment, last 12 mths 5/31/2022   Able to walk? Yes   Fall in past 12 months? 0      Abuse Screen:  Patient is not abused       Screening EKG   EKG order placed: Yes    End of Life Planning   Advanced care planning directives were discussed with the patient and /or family/caregiver.      Health Maintenance Due     Health Maintenance Due   Topic Date Due    Eye Exam Retinal or Dilated  12/06/2021    AAA Screening 73-69 YO Male Smoking Patients  Never done    COVID-19 Vaccine (4 - Booster for Moderna series) 04/09/2022    MICROALBUMIN Q1  08/10/2022       Patient Care Team   Patient Care Team:  Jono Garcia MD as PCP - General  Jono Garcia MD as PCP [facility] Empaneled Provider  MD Doris Quinones MD (Colon and Rectal Surgery)      Visit Vitals  /62 (BP 1 Location: Right arm, BP Patient Position: Sitting)   Pulse (!) 55   Temp 97.9 °F (36.6 °C) (Temporal)   Resp 18   Ht 6' (1.829 m)   Wt 206 lb (93.4 kg)   SpO2 97%   BMI 27.94 kg/m²     General appearance: alert, cooperative, no distress, appears stated age  Neck: supple, symmetrical, trachea midline, no adenopathy, thyroid: not enlarged, symmetric, no tenderness/mass/nodules, no carotid bruit and no JVD  Lungs: clear to auscultation bilaterally  Heart: regular rate and rhythm, S1, S2 normal, no murmur, click, rub or gallop    Extremities: extremities normal, atraumatic, no cyanosis or edema    History     Past Medical History:   Diagnosis Date    BPH (benign prostatic hyperplasia) 12/9/2009    CAD (coronary artery disease)     DDD (degenerative disc disease), lumbar 12/9/2009    Diverticulosis 12/9/2009    DM (diabetes mellitus) ([address] 12/9/2009    Heart abnormality     History of colon polyps 4/5/2016    Hypercholesterolemia     Hypertension     Peripheral vascular disease ([address]     Pseudogout 12/9/2009    Renal cell carcinoma of left kidney ([address] 6/19/15    Pathological Stage [id number] pap RCC FG2 s/p left open partial nephrectomy in 6/15     Renal cyst, right     Right shoulder pain 12/9/2009      Past Surgical History:   Procedure Laterality Date    ENDOSCOPY, COLON, DIAGNOSTIC  2000    sigmoidectomy    HX CORONARY STENT PLACEMENT  01/2022    HX NEPHRECTOMY Left 6/19/15    Partial, Dr. Blue Lela, [facility]    HX ORTHOPAEDIC  2005    left rotator cuff    HX OTHER SURGICAL  2007    hydrocele    HX TONSILLECTOMY  1978    NEPHRECTOMY Left 2015    particial nephrectomy      Current Outpatient Medications   Medication Sig Dispense Refill    empagliflozin (Jardiance) 25 mg tablet Take 1 Tablet by mouth daily. 90 Tablet 3    SITagliptin (Januvia) 100 mg tablet TAKE 1 TABLET DAILY 90 Tablet 3    ticagrelor (BRILINTA) 90 mg tablet Take 1 Tablet by mouth two (2) times a day.  180 Tablet 2    metoprolol tartrate (LOPRESSOR) 25 mg tablet Take 0.5 Tablets by mouth two (2) times a day. 90 Tablet 3    glucose blood VI test strips (FreeStyle Lite Strips) strip Blood sugar check daily. 100 Strip 3    colchicine 0.6 mg tablet Take 2 tabs PO X 1 ; may repeat 1 tab PO in one hour X1 prn continued gout. 30 Tablet 1    nystatin (MYCOSTATIN) powder Apply  to affected area four (4) times daily. 1 Each 1    aspirin 81 mg chewable tablet Take 81 mg by mouth daily. simvastatin (ZOCOR) 20 mg tablet TAKE 1 TABLET NIGHTLY 90 Tablet 3    metFORMIN (GLUCOPHAGE) 500 mg tablet TAKE 2 TABLETS IN THE MORNING AND 2 TABLETS IN THE EVENING (Patient taking differently: Take 1,000 mg by mouth two (2) times daily (with meals). ) 360 Tablet 3    lisinopriL (PRINIVIL, ZESTRIL) 5 mg tablet TAKE 1 TABLET DAILY 90 Tablet 3    lancets (FreeStyle Lancets) 28 gauge misc USE TO CHECK BLOOD SUGAR DAILY 100 Lancet 4    diazePAM (Valium) 5 mg tablet Take 1 Tablet by mouth every twelve (12) hours as needed for Anxiety. Max Daily Amount: 10 mg.  (Patient not taking: Reported on 2022) 30 Tablet 1     No Known Allergies    Family History   Problem Relation Age of Onset    Diabetes Mother     Dementia Maternal Grandmother     Stone Brother      Social History     Tobacco Use    Smoking status: Former     Packs/day: 1.00     Years: 40.00     Pack years: 40.00     Types: Cigarettes     Quit date: 2010     Years since quittin.5    Smokeless tobacco: Never   Substance Use Topics    Alcohol use: Not Currently     Comment: ck Trivedi

## 2022-09-06 NOTE — PROGRESS NOTES
1. \"Have you been to the ER, urgent care clinic since your last visit? Hospitalized since your last visit? \" Yes When: 6 weeks ago Where: [facility]  Reason for visit: hip pain     2. \"Have you seen or consulted any other health care providers outside of the [address] [place] [address] since your last visit? \" No     3. For patients aged 39-70: Has the patient had a colonoscopy / FIT/ Cologuard? Yes - Care Gap present.  Most recent result on file

## 2022-09-06 NOTE — PATIENT INSTRUCTIONS
Medicare Wellness Visit, Male    The best way to live healthy is to have a lifestyle where you eat a well-balanced diet, exercise regularly, limit alcohol use, and quit all forms of tobacco/nicotine, if applicable. Regular preventive services are another way to keep healthy. Preventive services (vaccines, screening tests, monitoring & exams) can help personalize your care plan, which helps you manage your own care. Screening tests can find health problems at the earliest stages, when they are easiest to treat. [facility] follows the current, evidence-based guidelines published by the [facility] ([facility]) when recommending preventive services for our patients. Because we follow these guidelines, sometimes recommendations change over time as research supports it. (For example, a prostate screening blood test is no longer routinely recommended for men with no symptoms). Of course, you and your doctor may decide to screen more often for some diseases, based on your risk and co-morbidities (chronic disease you are already diagnosed with). Preventive services for you include:  - Medicare offers their members a free annual wellness visit, which is time for you and your primary care provider to discuss and plan for your preventive service needs. Take advantage of this benefit every year!  -All adults over age 72 should receive the recommended pneumonia vaccines. Current USPSTF guidelines recommend a series of two vaccines for the best pneumonia protection.   -All adults should have a flu vaccine yearly and tetanus vaccine every 10 years.  -All adults age 48 and older should receive the shingles vaccines (series of two vaccines).        -All adults age 38-68 who are overweight should have a diabetes screening test once every three years.   -Other screening tests & preventive services for persons with diabetes include: an eye exam to screen for diabetic retinopathy, a kidney function test, a foot exam, and stricter control over your cholesterol.   -Cardiovascular screening for adults with routine risk involves an electrocardiogram (ECG) at intervals determined by the provider.   -Colorectal cancer screening should be done for adults age 54-65 with no increased risk factors for colorectal cancer. There are a number of acceptable methods of screening for this type of cancer. Each test has its own benefits and drawbacks. Discuss with your provider what is most appropriate for you during your annual wellness visit. The different tests include: colonoscopy (considered the best screening method), a fecal occult blood test, a fecal DNA test, and sigmoidoscopy.  -All adults born between [facility] should be screened once for Hepatitis C.  -An Abdominal Aortic Aneurysm (AAA) Screening is recommended for men age 73-68 who has ever smoked in their lifetime. Here is a list of your current Health Maintenance items (your personalized list of preventive services) with a due date:  Health Maintenance Due   Topic Date Due    Shingles Vaccine (1 of 2) Never done    Pneumococcal Vaccine (2 - PPSV23 or PCV20) 03/06/2018    DTaP/Tdap/Td  (2 - Td or Tdap) 03/15/2021    Eye Exam  12/06/2021    AAA Screening  Never done    Annual Well Visit  Never done    COVID-19 Vaccine (4 - Booster for Moderna series) 04/09/2022    Albumin Urine Test  08/10/2022    Diabetic Foot Care  08/13/2022    Yearly Flu Vaccine (1) 09/01/2022       Medicare Wellness Visit, Male    The best way to live healthy is to have a lifestyle where you eat a well-balanced diet, exercise regularly, limit alcohol use, and quit all forms of tobacco/nicotine, if applicable. Regular preventive services are another way to keep healthy. Preventive services (vaccines, screening tests, monitoring & exams) can help personalize your care plan, which helps you manage your own care.  Screening tests can find health problems at the earliest stages, when they are easiest to treat. [facility] follows the current, evidence-based guidelines published by the [facility] ([facility]) when recommending preventive services for our patients. Because we follow these guidelines, sometimes recommendations change over time as research supports it. (For example, a prostate screening blood test is no longer routinely recommended for men with no symptoms). Of course, you and your doctor may decide to screen more often for some diseases, based on your risk and co-morbidities (chronic disease you are already diagnosed with). Preventive services for you include:  - Medicare offers their members a free annual wellness visit, which is time for you and your primary care provider to discuss and plan for your preventive service needs. Take advantage of this benefit every year!  -All adults over age 72 should receive the recommended pneumonia vaccines. Current USPSTF guidelines recommend a series of two vaccines for the best pneumonia protection.   -All adults should have a flu vaccine yearly and tetanus vaccine every 10 years.  -All adults age 48 and older should receive the shingles vaccines (series of two vaccines). -All adults age 38-68 who are overweight should have a diabetes screening test once every three years.   -Other screening tests & preventive services for persons with diabetes include: an eye exam to screen for diabetic retinopathy, a kidney function test, a foot exam, and stricter control over your cholesterol.   -Cardiovascular screening for adults with routine risk involves an electrocardiogram (ECG) at intervals determined by the provider.   -Colorectal cancer screening should be done for adults age 54-65 with no increased risk factors for colorectal cancer. There are a number of acceptable methods of screening for this type of cancer.  Each test has its own benefits and drawbacks. Discuss with your provider what is most appropriate for you during your annual wellness visit. The different tests include: colonoscopy (considered the best screening method), a fecal occult blood test, a fecal DNA test, and sigmoidoscopy.  -All adults born between [facility] should be screened once for Hepatitis C.  -An Abdominal Aortic Aneurysm (AAA) Screening is recommended for men age 73-68 who has ever smoked in their lifetime.      Here is a list of your current Health Maintenance items (your personalized list of preventive services) with a due date:  Health Maintenance Due   Topic Date Due    Eye Exam  12/06/2021    AAA Screening  Never done    COVID-19 Vaccine (4 - Booster for Moderna series) 04/09/2022    Albumin Urine Test  08/10/2022

## 2022-11-10 ENCOUNTER — TELEPHONE (OUTPATIENT)
Dept: FAMILY MEDICINE CLINIC | Age: 67
End: 2022-11-10

## 2022-11-10 NOTE — TELEPHONE ENCOUNTER
Patient came into office requesting the provider to come out and write a prescription for some antibiotics, advised pt that an appt is needed and offered next available, pt became irate and stated needed something right now and that he will not go to an urgent care for his finger and began waving his middle finger, once I asked if he would not do that he then took off the bandage and stuck it up and asked if I could do anything, pt again declined to schedule, then stating that he no longer wants to be seen here and will be finding another provider, pt left the office

## 2022-11-18 RX ORDER — NYSTATIN 100000 [USP'U]/G
POWDER TOPICAL
Qty: 15 G | Refills: 90 | Status: SHIPPED | OUTPATIENT
Start: 2022-11-18

## 2022-11-29 RX ORDER — LISINOPRIL 5 MG/1
TABLET ORAL
Qty: 90 TABLET | Refills: 3 | Status: SHIPPED | OUTPATIENT
Start: 2022-11-29

## 2022-12-05 ENCOUNTER — OFFICE VISIT (OUTPATIENT)
Dept: CARDIOLOGY CLINIC | Age: 67
End: 2022-12-05
Payer: MEDICARE

## 2022-12-05 VITALS
WEIGHT: 206 LBS | OXYGEN SATURATION: 98 % | BODY MASS INDEX: 27.9 KG/M2 | DIASTOLIC BLOOD PRESSURE: 64 MMHG | HEIGHT: 72 IN | HEART RATE: 60 BPM | SYSTOLIC BLOOD PRESSURE: 122 MMHG

## 2022-12-05 DIAGNOSIS — I25.10 CORONARY ARTERY DISEASE INVOLVING NATIVE CORONARY ARTERY OF NATIVE HEART WITHOUT ANGINA PECTORIS: Primary | ICD-10-CM

## 2022-12-05 DIAGNOSIS — Z95.5 HISTORY OF CORONARY ARTERY STENT PLACEMENT: ICD-10-CM

## 2022-12-05 DIAGNOSIS — Z85.528 HISTORY OF RENAL CELL CANCER: ICD-10-CM

## 2022-12-05 DIAGNOSIS — Z95.5 STATUS POST INSERTION OF DRUG ELUTING CORONARY ARTERY STENT: ICD-10-CM

## 2022-12-05 DIAGNOSIS — E78.5 DYSLIPIDEMIA: ICD-10-CM

## 2022-12-05 PROCEDURE — G8417 CALC BMI ABV UP PARAM F/U: HCPCS | Performed by: INTERNAL MEDICINE

## 2022-12-05 PROCEDURE — 3017F COLORECTAL CA SCREEN DOC REV: CPT | Performed by: INTERNAL MEDICINE

## 2022-12-05 PROCEDURE — G8536 NO DOC ELDER MAL SCRN: HCPCS | Performed by: INTERNAL MEDICINE

## 2022-12-05 PROCEDURE — 1101F PT FALLS ASSESS-DOCD LE1/YR: CPT | Performed by: INTERNAL MEDICINE

## 2022-12-05 PROCEDURE — 99214 OFFICE O/P EST MOD 30 MIN: CPT | Performed by: INTERNAL MEDICINE

## 2022-12-05 PROCEDURE — G8432 DEP SCR NOT DOC, RNG: HCPCS | Performed by: INTERNAL MEDICINE

## 2022-12-05 PROCEDURE — G8427 DOCREV CUR MEDS BY ELIG CLIN: HCPCS | Performed by: INTERNAL MEDICINE

## 2022-12-05 PROCEDURE — 1124F ACP DISCUSS-NO DSCNMKR DOCD: CPT | Performed by: INTERNAL MEDICINE

## 2022-12-05 RX ORDER — ROSUVASTATIN CALCIUM 20 MG/1
20 TABLET, COATED ORAL
Qty: 90 TABLET | Refills: 1 | Status: SHIPPED | OUTPATIENT
Start: 2022-12-05

## 2022-12-05 NOTE — PROGRESS NOTES
HISTORY OF PRESENT ILLNESS  Ynes Capone is a 74 y.o. male. Follow-up of CAD, old PCI, hyperlipidemia  History of diabetes. History of left renal cancer status post partial nephrectomy 2015 12/22 Patient denies significant chest pain, SOB, palpitations, edema, dizziness      Follow-up  Pertinent negatives include no chest pain, no headaches and no shortness of breath. Review of Systems   Constitutional:  Negative for chills, diaphoresis, fever, malaise/fatigue and weight loss. HENT:  Negative for hearing loss and nosebleeds. Eyes:  Negative for blurred vision and discharge. Respiratory:  Negative for cough, shortness of breath, wheezing and stridor. Cardiovascular:  Negative for chest pain, palpitations, orthopnea, claudication, leg swelling and PND. Gastrointestinal:  Negative for diarrhea, heartburn, nausea and vomiting. Genitourinary:  Negative for dysuria and hematuria. Musculoskeletal:  Negative for joint pain and myalgias. Skin:  Negative for rash. Neurological:  Negative for dizziness, tingling, tremors, focal weakness, seizures, loss of consciousness, weakness and headaches. Endo/Heme/Allergies:  Negative for polydipsia. Does not bruise/bleed easily. Psychiatric/Behavioral:  Negative for depression, substance abuse and suicidal ideas. The patient does not have insomnia.     Family History   Problem Relation Age of Onset    Diabetes Mother     Dementia Maternal Grandmother     Stone Brother        Past Medical History:   Diagnosis Date    BPH (benign prostatic hyperplasia) 12/9/2009    CAD (coronary artery disease)     DDD (degenerative disc disease), lumbar 12/9/2009    Diverticulosis 12/9/2009    DM (diabetes mellitus) ([address] 12/9/2009    Heart abnormality     History of colon polyps 4/5/2016    Hypercholesterolemia     Hypertension     Peripheral vascular disease ([address]     Pseudogout 12/9/2009    Renal cell carcinoma of left kidney ([address] 6/19/15    Pathological Stage [id number] pap RCC FG2 s/p left open partial nephrectomy in 6/15     Renal cyst, right     Right shoulder pain 2009       Past Surgical History:   Procedure Laterality Date    ENDOSCOPY, COLON, DIAGNOSTIC      sigmoidectomy    HX CORONARY STENT PLACEMENT  2022    HX NEPHRECTOMY Left 6/19/15    Partial, Dr. Sherry Jones, [facility]    HX ORTHOPAEDIC  2005    left rotator cuff    HX OTHER SURGICAL  2007    hydrocele    HX TONSILLECTOMY  1978    NEPHRECTOMY Left 2015    particial nephrectomy        Social History     Tobacco Use    Smoking status: Former     Packs/day: 1.00     Years: 40.00     Pack years: 40.00     Types: Cigarettes     Quit date: 2010     Years since quittin.8    Smokeless tobacco: Never   Substance Use Topics    Alcohol use: Not Currently     Comment: occ       No Known Allergies    Outpatient Medications Marked as Taking for the 22 encounter (Office Visit) with Juju Quiñones MD   Medication Sig Dispense Refill    lisinopriL (PRINIVIL, ZESTRIL) 5 mg tablet TAKE 1 TABLET DAILY 90 Tablet 3    nystatin (MYCOSTATIN) powder APPLY TO THE AFFECTED AREA FOUR TIMES A DAY 15 g 90    empagliflozin (Jardiance) 25 mg tablet Take 1 Tablet by mouth daily. 90 Tablet 3    SITagliptin (Januvia) 100 mg tablet TAKE 1 TABLET DAILY 90 Tablet 3    ticagrelor (BRILINTA) 90 mg tablet Take 1 Tablet by mouth two (2) times a day. 180 Tablet 2    metoprolol tartrate (LOPRESSOR) 25 mg tablet Take 0.5 Tablets by mouth two (2) times a day. 90 Tablet 3    glucose blood VI test strips (FreeStyle Lite Strips) strip Blood sugar check daily. 100 Strip 3    colchicine 0.6 mg tablet Take 2 tabs PO X 1 ; may repeat 1 tab PO in one hour X1 prn continued gout. 30 Tablet 1    diazePAM (Valium) 5 mg tablet Take 1 Tablet by mouth every twelve (12) hours as needed for Anxiety. Max Daily Amount: 10 mg. 30 Tablet 1    aspirin 81 mg chewable tablet Take 81 mg by mouth daily.       simvastatin (ZOCOR) 20 mg tablet TAKE 1 TABLET NIGHTLY 90 Tablet 3    metFORMIN (GLUCOPHAGE) 500 mg tablet TAKE 2 TABLETS IN THE MORNING AND 2 TABLETS IN THE EVENING (Patient taking differently: Take 1,000 mg by mouth two (2) times daily (with meals). ) 360 Tablet 3    lancets (FreeStyle Lancets) 28 gauge misc USE TO CHECK BLOOD SUGAR DAILY 100 Lancet 4        Visit Vitals  /64 (BP 1 Location: Left upper arm, BP Patient Position: Sitting, BP Cuff Size: Adult)   Pulse 60   Ht 6' (1.829 m)   Wt 93.4 kg (206 lb)   SpO2 98%   BMI 27.94 kg/m²       Physical Exam  Constitutional:       Appearance: He is well-developed. HENT:      Head: Normocephalic and atraumatic. Eyes:      General: No scleral icterus. Conjunctiva/sclera: Conjunctivae normal.   Neck:      Vascular: No JVD. Cardiovascular:      Rate and Rhythm: Normal rate and regular rhythm. Heart sounds: Normal heart sounds. No murmur heard. No friction rub. No gallop. Pulmonary:      Effort: Pulmonary effort is normal. No respiratory distress. Breath sounds: Normal breath sounds. No stridor. No wheezing or rales. Chest:      Chest wall: No tenderness. Abdominal:      General: There is no distension. Tenderness: There is no abdominal tenderness. Musculoskeletal:         General: No tenderness. Normal range of motion. Cervical back: Normal range of motion and neck supple. Right lower leg: No edema. Left lower leg: No edema. Lymphadenopathy:      Cervical: No cervical adenopathy. Skin:     Findings: No erythema or rash. Neurological:      Mental Status: He is alert and oriented to person, place, and time. Cranial Nerves: No cranial nerve deficit. Psychiatric:         Behavior: Behavior normal.   ekg sinus rhythm with no acute st-t changes    01/03/22    NUCLEAR CARDIAC STRESS TEST 01/03/2022 1/4/2022    Interpretation Summary    Nuclear Findings: LVEF measures 54%. TID ratio is 0.99. Nuclear Findings:  There is a mild severity left ventricular stress perfusion defect. There is normal wall motion in the defect area. The defect appears to be an artifact caused by subdiaphragmatic activity. Perfusion defect was visually and quantitatively present. Nuclear Findings: The study is most consistent with artifact but cannot rule out a small degree of myocardial ischemia. Findings suggest a low risk of myocardial ischemia. ECG: Resting ECG demonstrates normal sinus rhythm. Stress Test: A Amaury protocol stress test was performed. EKG portion revealed 2 mm st segment depression in inferolateral leads  Recommend clinical correlation. Signed by: Teresa Glover MD on 1/3/2022  7:10 PM  01/10/22    CARDIAC PROCEDURE 01/10/2022 1/10/2022    Conclusion  Critical branch vessel stenosis ( mid D1 90-95%) with preserved LV function. S/p ptca/stent ( TOBY). Lesion reduced to 0%. Continue DAPT/ intense risk factor modification. Signed by: Teresa Glover MD on 1/10/2022  9:57 AM      ASSESSMENT and PLAN     Results for Ignacio Raya (MRN [id number]) as of 6/1/2022 11:15   Ref.  Range 8/10/2021 08:59 12/13/2021 10:29   Triglyceride Latest Ref Range: <150 MG/ (H) 276 (H)   Cholesterol, total Latest Ref Range: <200 MG/ 141   HDL Cholesterol Latest Ref Range: 40 - 60 MG/DL 50 41   CHOL/HDL Ratio Latest Ref Range: 0 - 5.0   3.1 3.4   VLDL, calculated Latest Units: MG/DL 30.6 55.2   LDL, calculated Latest Ref Range: 0 - 100 MG/DL 76.4 44.8   Hemoglobin A1c, (calculated) Latest Ref Range: 4.2 - 5.6 % 6.4 (H) 7.0 (H)     Component Ref Range & Units 9/3/22 0828 12/13/21 1029 8/10/21 0859 6/15/20 0853 2/20/20 0925 11/7/19 0823 6/28/19 0810   LIPID PROFILE                      [facility]         Cholesterol, total <200 MG/  141  157  153 [1]  158  160 [1]  146    Triglyceride <150 MG/ High   276 High  CM  153 High  CM  280 High  CM[1]  227 High  CM  125 CM[1]  133 CM    Comment: The drugs N-acetylcysteine (NAC) and Metamiszole have been found to cause falsely   low results in this chemical assay. Please   be sure to submit blood samples obtained   BEFORE administration of either of these   drugs to assure correct results. HDL Cholesterol 40 - 60 MG/DL 36 Low   41  50  40 [1]  44  46 [1]  53    LDL, calculated 0 - 100 MG/DL LDL AND VLDL CHOLESTEROL NOT CALCULATED WHEN TRIGLYCERIDES >400 MG/DL OR HDL CHOLESTEROL <20 MG/DL  44.8  76.4  57 [1]  68.6  89 [1]  66.4    VLDL, calculated MG/DL Calculation not valid with this patient's other Lipid values. 55.2  30.6  56 [1]  45.4  25 [1]  26.6      current treatment plan is effective, no change in therapy  cardiovascular risk and specific lipid/LDL goals reviewed  use of aspirin to prevent MI and TIA's discussed. Patient is a 82-year-old male with history of diabetes, dyslipidemia seen for recurrent episodes of left-sided chest tightness occurring both at rest and exertion lasting few minutes. Also reports intermittent upper back discomfort. CCS 3. Reports exertional dyspnea at moderate activity. No orthopnea or PND. Underwent recent stress test which revealed normal SPECT imaging however found to have 2 mm ST segment depression in inferolateral leads at peak exercise. 1/22 Subsequently underwent coronary angiogram which revealed high-grade stenosis in diagonal 1 artery. Status post PCI using drug-eluting stent. Seen for follow-up. Right radial artery intact with no ecchymosis or hematoma. Chest pain has resolved. Currently on dual antiplatelet. Advised to decrease the metoprolol dose to 12.5 mg twice daily. LDL on target. Continue current medications and follow-up in 4 months. 6/1/2022 CAD stable. Lipids are not controlled. Check mammogram and consider adjusting the medications as needed. Detailed discussion on cholesterol diet exercise and weight loss. He has lost some weight already and is trying to lose more.   Mediterranean diet guidelines given.    Diagnoses and all orders for this visit:    1. Coronary artery disease involving native coronary artery of native heart without angina pectoris    2. Dyslipidemia  -     rosuvastatin (CRESTOR) 20 mg tablet; Take 1 Tablet by mouth nightly. -     LIPID PANEL; Future  -     HEPATIC FUNCTION PANEL; Future    3. History of coronary artery stent placement    4. Status post insertion of drug eluting coronary artery stent    5. History of renal cell cancer        Pertinent laboratory and test data reviewed and discussed with patient. See patient instructions also for other medical advice given    Medications Discontinued During This Encounter   Medication Reason    simvastatin (ZOCOR) 20 mg tablet Alternate Therapy       Follow-up and Dispositions    Return in about 6 months (around 6/5/2023), or if symptoms worsen or fail to improve, for post test.       12/5/2022 CAD stable. May discontinue Brilinta after this month as discussed and explained. Lipids are not controlled with severely elevated triglycerides. Will change simvastatin to rosuvastatin and follow the labs. Will likely need a fibrate also. Healthy diet discussed again and reinforced. Recommended to lose 10 more pounds.

## 2022-12-05 NOTE — PROGRESS NOTES
1. Have you been to the ER, urgent care clinic since your last visit? Hospitalized since your last visit? No    2. Have you seen or consulted any other health care providers outside of the [address] [place] [address] since your last visit? Include any pap smears or colon screening. No     3. Since your last visit, have you had any of the following symptoms? None    4. Have you had any blood work, X-rays or cardiac testing? No     Requested: NO     In [facility]: NO    5. Where do you normally have your labs drawn? PCP, [facility]     6. Do you need any refills today?    No

## 2022-12-05 NOTE — PATIENT INSTRUCTIONS
Medications Discontinued During This Encounter   Medication Reason    simvastatin (ZOCOR) 20 mg tablet Alternate Therapy      May discontinue Brilinta after this month. Learning About the [address] Diet  What is the Mediterranean diet? The Mediterranean diet is a style of eating rather than a diet plan. It features foods eaten in [place], [place], [place] and [place], and other countries along the [facility]. It emphasizes eating foods like fish, fruits, vegetables, beans, high-fiber breads and whole grains, nuts, and olive oil. This style of eating includes limited red meat, cheese, and sweets. Why choose the Mediterranean diet? A Mediterranean-style diet may improve heart health. It contains more fat than other heart-healthy diets. But the fats are mainly from nuts, unsaturated oils (such as fish oils and olive oil), and certain nut or seed oils (such as canola, soybean, or flaxseed oil). These fats may help protect the heart and blood vessels. How can you get started on the Mediterranean diet? Here are some things you can do to switch to a more Mediterranean way of eating. What to eat  Eat a variety of fruits and vegetables each day, such as grapes, blueberries, tomatoes, broccoli, peppers, figs, olives, spinach, eggplant, beans, lentils, and chickpeas. Eat a variety of whole-grain foods each day, such as oats, brown rice, and whole wheat bread, pasta, and couscous. Eat fish at least 2 times a week. Try tuna, salmon, mackerel, lake trout, herring, or sardines. Eat moderate amounts of low-fat dairy products, such as milk, cheese, or yogurt. Eat moderate amounts of poultry and eggs. Choose healthy (unsaturated) fats, such as nuts, olive oil, and certain nut or seed oils like canola, soybean, and flaxseed. Limit unhealthy (saturated) fats, such as butter, palm oil, and coconut oil. And limit fats found in animal products, such as meat and dairy products made with whole milk.  Try to eat red meat only a few times a month in very small amounts. Limit sweets and desserts to only a few times a week. This includes sugar-sweetened drinks like soda. The Mediterranean diet may also include red wine with your meal--1 glass each day for women and up to 2 glasses a day for men. Tips for eating at home  Use herbs, spices, garlic, lemon zest, and citrus juice instead of salt to add flavor to foods. Add avocado slices to your sandwich instead of merchant. Have fish for lunch or dinner instead of red meat. Brush the fish with olive oil, and broil or grill it. Sprinkle your salad with seeds or nuts instead of cheese. Cook with olive or canola oil instead of butter or oils that are high in saturated fat. Switch from 2% milk or whole milk to 1% or fat-free milk. Dip raw vegetables in a vinaigrette dressing or hummus instead of dips made from mayonnaise or sour cream.  Have a piece of fruit for dessert instead of a piece of cake. Try baked apples, or have some dried fruit. Tips for eating out  Try broiled, grilled, baked, or poached fish instead of having it fried or breaded. Ask your  to have your meals prepared with olive oil instead of butter. Order dishes made with marinara sauce or sauces made from olive oil. Avoid sauces made from cream or mayonnaise. Choose whole-grain breads, whole wheat pasta and pizza crust, brown rice, beans, and lentils. Cut back on butter or margarine on bread. Instead, you can dip your bread in a small amount of olive oil. Ask for a side salad or grilled vegetables instead of french fries or chips. Where can you learn more? Go to http://www.[employer].com/  Enter O407 in the search box to learn more about \"Learning About the Mediterranean Diet. \"  Current as of: May 9, 2022               Content Version: 13.4  © 6352-0952 [employer].    Care instructions adapted under license by [employer] (which disclaims liability or warranty for this information). If you have questions about a medical condition or this instruction, always ask your healthcare professional. [place] [address] any warranty or liability for your use of this information.

## 2023-01-16 ENCOUNTER — HOSPITAL ENCOUNTER (OUTPATIENT)
Dept: LAB | Age: 68
Discharge: HOME | End: 2023-01-16
Payer: MEDICARE

## 2023-01-16 DIAGNOSIS — E78.00 HYPERCHOLESTEROLEMIA: ICD-10-CM

## 2023-01-16 DIAGNOSIS — E11.65 TYPE 2 DIABETES MELLITUS WITH HYPERGLYCEMIA, WITHOUT LONG-TERM CURRENT USE OF INSULIN (HCC): ICD-10-CM

## 2023-01-16 DIAGNOSIS — E78.5 DYSLIPIDEMIA: ICD-10-CM

## 2023-01-16 LAB
ALBUMIN SERPL-MCNC: 3.8 G/DL (ref 3.4–5)
ALBUMIN SERPL-MCNC: 3.9 G/DL (ref 3.4–5)
ALBUMIN/GLOB SERPL: 1.2 (ref 0.8–1.7)
ALBUMIN/GLOB SERPL: 1.2 (ref 0.8–1.7)
ALP SERPL-CCNC: 56 U/L (ref 45–117)
ALP SERPL-CCNC: 57 U/L (ref 45–117)
ALT SERPL-CCNC: 21 U/L (ref 16–61)
ALT SERPL-CCNC: 24 U/L (ref 16–61)
ANION GAP SERPL CALC-SCNC: 7 MMOL/L (ref 3–18)
AST SERPL-CCNC: 12 U/L (ref 10–38)
AST SERPL-CCNC: 14 U/L (ref 10–38)
BILIRUB DIRECT SERPL-MCNC: <0.1 MG/DL (ref 0–0.2)
BILIRUB SERPL-MCNC: 0.4 MG/DL (ref 0.2–1)
BILIRUB SERPL-MCNC: 0.6 MG/DL (ref 0.2–1)
BUN SERPL-MCNC: 19 MG/DL (ref 7–18)
BUN/CREAT SERPL: 18 (ref 12–20)
CALCIUM SERPL-MCNC: 9.5 MG/DL (ref 8.5–10.1)
CHLORIDE SERPL-SCNC: 108 MMOL/L (ref 100–111)
CHOLEST SERPL-MCNC: 135 MG/DL
CHOLEST SERPL-MCNC: 136 MG/DL
CO2 SERPL-SCNC: 27 MMOL/L (ref 21–32)
CREAT SERPL-MCNC: 1.05 MG/DL (ref 0.6–1.3)
EST. AVERAGE GLUCOSE BLD GHB EST-MCNC: 157 MG/DL
GLOBULIN SER CALC-MCNC: 3.3 G/DL (ref 2–4)
GLOBULIN SER CALC-MCNC: 3.3 G/DL (ref 2–4)
GLUCOSE SERPL-MCNC: 156 MG/DL (ref 74–99)
HBA1C MFR BLD: 7.1 % (ref 4.2–5.6)
HDLC SERPL-MCNC: 44 MG/DL (ref 40–60)
HDLC SERPL-MCNC: 45 MG/DL (ref 40–60)
HDLC SERPL: 3 (ref 0–5)
HDLC SERPL: 3.1 (ref 0–5)
LDLC SERPL CALC-MCNC: 39.8 MG/DL (ref 0–100)
LDLC SERPL CALC-MCNC: 40.2 MG/DL (ref 0–100)
LIPID PROFILE,FLP: ABNORMAL
LIPID PROFILE,FLP: ABNORMAL
POTASSIUM SERPL-SCNC: 4.4 MMOL/L (ref 3.5–5.5)
PROT SERPL-MCNC: 7.1 G/DL (ref 6.4–8.2)
PROT SERPL-MCNC: 7.2 G/DL (ref 6.4–8.2)
SODIUM SERPL-SCNC: 142 MMOL/L (ref 136–145)
TRIGL SERPL-MCNC: 249 MG/DL (ref ?–150)
TRIGL SERPL-MCNC: 261 MG/DL (ref ?–150)
VLDLC SERPL CALC-MCNC: 49.8 MG/DL
VLDLC SERPL CALC-MCNC: 52.2 MG/DL

## 2023-01-16 PROCEDURE — 83036 HEMOGLOBIN GLYCOSYLATED A1C: CPT

## 2023-01-16 PROCEDURE — 80076 HEPATIC FUNCTION PANEL: CPT

## 2023-01-16 PROCEDURE — 80061 LIPID PANEL: CPT

## 2023-01-16 PROCEDURE — 80053 COMPREHEN METABOLIC PANEL: CPT

## 2023-01-16 PROCEDURE — 36415 COLL VENOUS BLD VENIPUNCTURE: CPT

## 2023-01-18 ENCOUNTER — OFFICE VISIT (OUTPATIENT)
Dept: FAMILY MEDICINE CLINIC | Age: 68
End: 2023-01-18
Payer: MEDICARE

## 2023-01-18 VITALS
OXYGEN SATURATION: 97 % | HEART RATE: 60 BPM | RESPIRATION RATE: 16 BRPM | SYSTOLIC BLOOD PRESSURE: 127 MMHG | WEIGHT: 201 LBS | TEMPERATURE: 97.2 F | HEIGHT: 72 IN | BODY MASS INDEX: 27.22 KG/M2 | DIASTOLIC BLOOD PRESSURE: 73 MMHG

## 2023-01-18 DIAGNOSIS — E11.65 TYPE 2 DIABETES MELLITUS WITH HYPERGLYCEMIA, WITHOUT LONG-TERM CURRENT USE OF INSULIN (HCC): Primary | ICD-10-CM

## 2023-01-18 DIAGNOSIS — E78.00 HYPERCHOLESTEROLEMIA: ICD-10-CM

## 2023-01-18 DIAGNOSIS — I10 HTN (HYPERTENSION), BENIGN: ICD-10-CM

## 2023-01-18 DIAGNOSIS — Z87.891 HISTORY OF TOBACCO ABUSE: ICD-10-CM

## 2023-01-18 DIAGNOSIS — I20.8 STABLE ANGINA PECTORIS (HCC): ICD-10-CM

## 2023-01-18 DIAGNOSIS — C64.2 RENAL CELL CARCINOMA OF LEFT KIDNEY (HCC): ICD-10-CM

## 2023-01-18 DIAGNOSIS — F41.9 ANXIETY: ICD-10-CM

## 2023-01-18 PROCEDURE — 1101F PT FALLS ASSESS-DOCD LE1/YR: CPT | Performed by: FAMILY MEDICINE

## 2023-01-18 PROCEDURE — G8417 CALC BMI ABV UP PARAM F/U: HCPCS | Performed by: FAMILY MEDICINE

## 2023-01-18 PROCEDURE — 3078F DIAST BP <80 MM HG: CPT | Performed by: FAMILY MEDICINE

## 2023-01-18 PROCEDURE — G8536 NO DOC ELDER MAL SCRN: HCPCS | Performed by: FAMILY MEDICINE

## 2023-01-18 PROCEDURE — 99215 OFFICE O/P EST HI 40 MIN: CPT | Performed by: FAMILY MEDICINE

## 2023-01-18 PROCEDURE — G8427 DOCREV CUR MEDS BY ELIG CLIN: HCPCS | Performed by: FAMILY MEDICINE

## 2023-01-18 PROCEDURE — 3074F SYST BP LT 130 MM HG: CPT | Performed by: FAMILY MEDICINE

## 2023-01-18 PROCEDURE — G0463 HOSPITAL OUTPT CLINIC VISIT: HCPCS | Performed by: FAMILY MEDICINE

## 2023-01-18 PROCEDURE — 1124F ACP DISCUSS-NO DSCNMKR DOCD: CPT | Performed by: FAMILY MEDICINE

## 2023-01-18 PROCEDURE — 3051F HG A1C>EQUAL 7.0%<8.0%: CPT | Performed by: FAMILY MEDICINE

## 2023-01-18 PROCEDURE — 3017F COLORECTAL CA SCREEN DOC REV: CPT | Performed by: FAMILY MEDICINE

## 2023-01-18 PROCEDURE — G8510 SCR DEP NEG, NO PLAN REQD: HCPCS | Performed by: FAMILY MEDICINE

## 2023-01-18 PROCEDURE — 2022F DILAT RTA XM EVC RTNOPTHY: CPT | Performed by: FAMILY MEDICINE

## 2023-01-18 RX ORDER — COLCHICINE 0.6 MG/1
TABLET ORAL
Qty: 30 TABLET | Refills: 1 | Status: SHIPPED | OUTPATIENT
Start: 2023-01-18

## 2023-01-18 RX ORDER — DIAZEPAM 5 MG/1
5 TABLET ORAL
Qty: 30 TABLET | Refills: 1 | Status: SHIPPED | OUTPATIENT
Start: 2023-01-18

## 2023-01-18 NOTE — PROGRESS NOTES
HPI:  Desiree Saenz is a 74 y.o. male who presents today with   Chief Complaint   Patient presents with    Cholesterol Problem     4 mf/u    Diabetes    Hypertension        Patient is here to follow-up on diabetes, hypertension and hypercholesterolemia. Patient is on medications as listed below  Patient is tolerating the medication without incident. He has had his blood work and is here to review the same. Blood work is as listed below. Lab Results   Component Value Date/Time    Cholesterol, total 135 01/16/2023 08:18 AM    Cholesterol (POC) 136 06/18/2013 08:30 AM    HDL Cholesterol 45 01/16/2023 08:18 AM    HDL Cholesterol (POC) 35 06/18/2013 08:30 AM    LDL Cholesterol (POC) 58 06/18/2013 08:30 AM    LDL, calculated 40.2 01/16/2023 08:18 AM    VLDL, calculated 49.8 01/16/2023 08:18 AM    Triglyceride 249 (H) 01/16/2023 08:18 AM    Triglycerides (POC) 219 06/18/2013 08:30 AM    CHOL/HDL Ratio 3.0 01/16/2023 08:18 AM     Lab Results   Component Value Date/Time    Sodium 142 01/16/2023 08:13 AM    Potassium 4.4 01/16/2023 08:13 AM    Chloride 108 01/16/2023 08:13 AM    CO2 27 01/16/2023 08:13 AM    Anion gap 7 01/16/2023 08:13 AM    Glucose 156 (H) 01/16/2023 08:13 AM    BUN 19 (H) 01/16/2023 08:13 AM    Creatinine 1.05 01/16/2023 08:13 AM    BUN/Creatinine ratio 18 01/16/2023 08:13 AM    GFR est AA >60 09/03/2022 08:28 AM    GFR est non-AA >60 09/03/2022 08:28 AM    Calcium 9.5 01/16/2023 08:13 AM    Bilirubin, total 0.6 01/16/2023 08:18 AM    Alk.  phosphatase 57 01/16/2023 08:18 AM    Protein, total 7.1 01/16/2023 08:18 AM    Albumin 3.8 01/16/2023 08:18 AM    Globulin 3.3 01/16/2023 08:18 AM    A-G Ratio 1.2 01/16/2023 08:18 AM    ALT (SGPT) 21 01/16/2023 08:18 AM    AST (SGOT) 14 01/16/2023 08:18 AM     Lab Results   Component Value Date/Time    Hemoglobin A1c 7.1 (H) 01/16/2023 08:13 AM    Hemoglobin A1c (POC) 7.6 05/31/2022 10:30 AM     Other health conditions managed by specialty or that are stable include unless otherwise noted : Renal cell carcinoma, stable angina/CAD      Has broken a nail on the left hand ; and contused the right great toe      3 most recent PHQ Screens 1/18/2023   PHQ Not Done -   Little interest or pleasure in doing things Not at all   Feeling down, depressed, irritable, or hopeless Not at all   Total Score PHQ 2 0   Trouble falling or staying asleep, or sleeping too much -   Feeling tired or having little energy -   Poor appetite, weight loss, or overeating -   Feeling bad about yourself - or that you are a failure or have let yourself or your family down -   Trouble concentrating on things such as school, work, reading, or watching TV -   Moving or speaking so slowly that other people could have noticed; or the opposite being so fidgety that others notice -   Thoughts of being better off dead, or hurting yourself in some way -   PHQ 9 Score -               PMH,  Meds, Allergies, Family History, Social history reviewed      Current Outpatient Medications   Medication Sig Dispense Refill    colchicine 0.6 mg tablet Take 2 tabs PO X 1 ; may repeat 1 tab PO in one hour X1 prn continued gout. 30 Tablet 1    diazePAM (Valium) 5 mg tablet Take 1 Tablet by mouth every twelve (12) hours as needed for Anxiety. Max Daily Amount: 10 mg. 30 Tablet 1    lisinopriL (PRINIVIL, ZESTRIL) 5 mg tablet TAKE 1 TABLET DAILY 90 Tablet 3    nystatin (MYCOSTATIN) powder APPLY TO THE AFFECTED AREA FOUR TIMES A DAY 15 g 90    empagliflozin (Jardiance) 25 mg tablet Take 1 Tablet by mouth daily. 90 Tablet 3    SITagliptin (Januvia) 100 mg tablet TAKE 1 TABLET DAILY 90 Tablet 3    ticagrelor (BRILINTA) 90 mg tablet Take 1 Tablet by mouth two (2) times a day. 180 Tablet 2    metoprolol tartrate (LOPRESSOR) 25 mg tablet Take 0.5 Tablets by mouth two (2) times a day. 90 Tablet 3    glucose blood VI test strips (FreeStyle Lite Strips) strip Blood sugar check daily.  100 Strip 3    aspirin 81 mg chewable tablet Take 81 mg by mouth daily. metFORMIN (GLUCOPHAGE) 500 mg tablet TAKE 2 TABLETS IN THE MORNING AND 2 TABLETS IN THE EVENING (Patient taking differently: Take 1,000 mg by mouth two (2) times daily (with meals). ) 360 Tablet 3    lancets (FreeStyle Lancets) 28 gauge misc USE TO CHECK BLOOD SUGAR DAILY 100 Lancet 4        No Known Allergies               Anguiano  per HPI  Thinks neuropathy in feet may be worsening ; not interested in meds or PT at this point  Some back pain  Contused his right great toe   Injured his right knee after all fall in the woods      Visit Vitals  /73 (BP 1 Location: Right arm, BP Patient Position: Sitting, BP Cuff Size: Large adult)   Pulse 60   Temp 97.2 °F (36.2 °C) (Temporal)   Resp 16   Ht 6' (1.829 m)   Wt 201 lb (91.2 kg)   SpO2 97%   BMI 27.26 kg/m²     Physical Exam    General appearance: alert, cooperative, no distress, appears stated age  Neck: supple, symmetrical, trachea midline, no adenopathy, thyroid: not enlarged, symmetric, no tenderness/mass/nodules, no carotid bruit and no JVD  Lungs: clear to auscultation bilaterally  Heart: regular rate and rhythm, S1, S2 normal, no murmur, click, rub or gallop    Extremities: extremities normal, atraumatic, no cyanosis or edema; left middle finger ; healed paronychia  Right great toe unremarkable. Left middle digit unremarkable. Assessment/Plan:  Diagnoses and all orders for this visit:    1. Type 2 diabetes mellitus with hyperglycemia, without long-term current use of insulin ([address]  Assessment & Plan:   well controlled, continue current medications    Orders:  -     HEMOGLOBIN A1C WITH EAG; Future    2. Hypercholesterolemia  -     LIPID PANEL; Future  -     METABOLIC PANEL, COMPREHENSIVE; Future    3. HTN (hypertension), benign    4. Renal cell carcinoma of left kidney (HCC)  Assessment & Plan:   monitored by specialist. No acute findings meriting change in the plan      5.  Stable angina pectoris [facility])  Assessment & Plan: monitored by specialist. No acute findings meriting change in the plan      6. Anxiety  -     diazePAM (Valium) 5 mg tablet; Take 1 Tablet by mouth every twelve (12) hours as needed for Anxiety. Max Daily Amount: 10 mg.    7. History of tobacco abuse    Other orders  -     colchicine 0.6 mg tablet; Take 2 tabs PO X 1 ; may repeat 1 tab PO in one hour X1 prn continued gout. As above  Pt stable  Refilled meds needed  Advised pt to change to crestor per cardiology recommendation  Labs ordered for next visit. Will try to reorder AAA screening US   treatment plan as listed below  Orders Placed This Encounter    LIPID PANEL    METABOLIC PANEL, COMPREHENSIVE    HEMOGLOBIN A1C WITH EAG    colchicine 0.6 mg tablet    diazePAM (Valium) 5 mg tablet       Follow-up and Dispositions    Return in about 4 months (around 5/18/2023) for diabetes. An After Visit Summary was printed and given to the patient. This has been fully explained to the patient, who indicates understanding. Total time for visit 45 min.      Yumiko Day MD

## 2023-01-18 NOTE — PATIENT INSTRUCTIONS
Counting Carbohydrates for Diabetes: Care Instructions  Overview     Managing the amount of carbohydrate (carbs) you eat is an important part of planning healthy meals when you have diabetes. Carbs raise blood sugar more than any other nutrient. Carbs are found in grains, starchy vegetables, fruits, and milk and yogurt. Carbs are also found in sugar-sweetened foods and drinks. The more carbs you eat at one time, the higher your blood sugar will rise. Counting carbs can help you keep your blood sugar within your target range. If you use insulin, counting carbs helps you match the right amount of insulin to the number of grams of carbs in a meal.  A registered dietitian or diabetes educator can help you plan meals and snacks. Follow-up care is a key part of your treatment and safety. Be sure to make and go to all appointments, and call your doctor if you are having problems. It's also a good idea to know your test results and keep a list of the medicines you take. How can you care for yourself at home? Know your daily amount of carbohydrates  Your daily amount depends on several things, such as your weight, how active you are, which diabetes medicines you take, and what your goals are for your blood sugar levels. A registered dietitian or diabetes educator can help you plan how many carbs to include in each meal and snack. For most adults, a guideline for the daily amount of carbs is:  45 to 60 grams at each meal. That's about the same as 3 to 4 carbohydrate servings. 15 to 20 grams at each snack. That's about the same as 1 carbohydrate serving. Count carbs  Counting carbs lets you know how much rapid-acting insulin to take before you eat. If you use an insulin pump, you get a constant rate of insulin during the day. So the pump must be programmed at meals. This gives you extra insulin to cover the rise in blood sugar after meals. If you take insulin:  Learn your own insulin-to-carb ratio.  You and your diabetes health professional will figure out the ratio. You can do this by testing your blood sugar after meals. For example, you may need a certain amount of insulin for every 15 grams of carbs. Add up the carb grams in a meal. Then you can figure out how many units of insulin to take based on your insulin-to-carb ratio. Exercise lowers blood sugar. You can use less insulin than you would if you were not doing exercise. Keep in mind that timing matters. If you exercise within 1 hour after a meal, your body may need less insulin for that meal than it would if you exercised 3 hours after the meal. Test your blood sugar to find out how exercise affects your need for insulin. If you do or don't take insulin:  Look at labels on packaged foods. This can tell you how many carbs are in a serving. Be aware of portions, or serving sizes. If a package has two servings and you eat the whole package, you need to double the number of grams of carbohydrate listed for one serving. Protein, fat, and fiber do not raise blood sugar as much as carbs do. If you eat a lot of these nutrients in a meal, your blood sugar will rise more slowly than it would otherwise. Where can you learn more? Go to http://www.gray.com/  Enter G703 in the search box to learn more about \"Counting Carbohydrates for Diabetes: Care Instructions. \"  Current as of: May 9, 2022               Content Version: 13.4  © 4978-0171 [employer], [employer]. Care instructions adapted under license by [employer] (which disclaims liability or warranty for this information). If you have questions about a medical condition or this instruction, always ask your healthcare professional. [place] [address] any warranty or liability for your use of this information.

## 2023-01-18 NOTE — PROGRESS NOTES
1. \"Have you been to the ER, urgent care clinic since your last visit? Hospitalized since your last visit? \" No    2. \"Have you seen or consulted any other health care providers outside of the [address] [place] [address] since your last visit? \" No     3. For patients aged 39-70: Has the patient had a colonoscopy / FIT/ Cologuard? Yes - Care Gap present.  Most recent result on file

## 2023-01-20 RX ORDER — METFORMIN HYDROCHLORIDE 500 MG/1
TABLET ORAL
Qty: 360 TABLET | Refills: 3 | Status: SHIPPED | OUTPATIENT
Start: 2023-01-20

## 2023-01-23 ENCOUNTER — TELEPHONE (OUTPATIENT)
Dept: CARDIOLOGY CLINIC | Age: 68
End: 2023-01-23

## 2023-01-23 NOTE — TELEPHONE ENCOUNTER
----- Message from Lizzy Ram MD sent at 1/23/2023 10:09 AM EST -----  Please contact patient and do the following asap    Check what he takes for lipids and show me ASAP

## 2023-01-23 NOTE — TELEPHONE ENCOUNTER
Patient stated he will c/b with the names of his lipid medications.    --------------------------------------------------------    MD KARLO Polanco Cas Nurse  Please contact patient and do the following asap     Check what he takes for lipids and show me ASAP

## 2023-02-05 DIAGNOSIS — E11.65 TYPE 2 DIABETES MELLITUS WITH HYPERGLYCEMIA, WITHOUT LONG-TERM CURRENT USE OF INSULIN (HCC): Primary | ICD-10-CM

## 2023-02-05 DIAGNOSIS — E78.00 HYPERCHOLESTEROLEMIA: Primary | ICD-10-CM

## 2023-04-05 RX ORDER — TICAGRELOR 90 MG/1
TABLET ORAL
Qty: 180 TABLET | Refills: 3 | Status: SHIPPED | OUTPATIENT
Start: 2023-04-05

## 2023-05-12 ENCOUNTER — HOSPITAL ENCOUNTER (OUTPATIENT)
Facility: HOSPITAL | Age: 68
Setting detail: SPECIMEN
End: 2023-05-12
Payer: MEDICARE

## 2023-05-12 DIAGNOSIS — E11.65 TYPE 2 DIABETES MELLITUS WITH HYPERGLYCEMIA, WITHOUT LONG-TERM CURRENT USE OF INSULIN (HCC): ICD-10-CM

## 2023-05-12 DIAGNOSIS — E78.00 HYPERCHOLESTEROLEMIA: ICD-10-CM

## 2023-05-12 LAB
ALBUMIN SERPL-MCNC: 4.1 G/DL (ref 3.4–5)
ALBUMIN/GLOB SERPL: 1.3 (ref 0.8–1.7)
ALP SERPL-CCNC: 61 U/L (ref 45–117)
ALT SERPL-CCNC: 33 U/L (ref 16–61)
ANION GAP SERPL CALC-SCNC: 4 MMOL/L (ref 3–18)
AST SERPL-CCNC: 21 U/L (ref 10–38)
BILIRUB SERPL-MCNC: 0.4 MG/DL (ref 0.2–1)
BUN SERPL-MCNC: 18 MG/DL (ref 7–18)
BUN/CREAT SERPL: 18 (ref 12–20)
CALCIUM SERPL-MCNC: 9.6 MG/DL (ref 8.5–10.1)
CHLORIDE SERPL-SCNC: 105 MMOL/L (ref 100–111)
CHOLEST SERPL-MCNC: 113 MG/DL
CO2 SERPL-SCNC: 29 MMOL/L (ref 21–32)
CREAT SERPL-MCNC: 1.01 MG/DL (ref 0.6–1.3)
EST. AVERAGE GLUCOSE BLD GHB EST-MCNC: 174 MG/DL
GLOBULIN SER CALC-MCNC: 3.1 G/DL (ref 2–4)
GLUCOSE SERPL-MCNC: 158 MG/DL (ref 74–99)
HBA1C MFR BLD: 7.7 % (ref 4.2–5.6)
HDLC SERPL-MCNC: 47 MG/DL (ref 40–60)
HDLC SERPL: 2.4 (ref 0–5)
LDLC SERPL CALC-MCNC: 33.2 MG/DL (ref 0–100)
LIPID PANEL: ABNORMAL
POTASSIUM SERPL-SCNC: 4.4 MMOL/L (ref 3.5–5.5)
PROT SERPL-MCNC: 7.2 G/DL (ref 6.4–8.2)
SODIUM SERPL-SCNC: 138 MMOL/L (ref 136–145)
TRIGL SERPL-MCNC: 164 MG/DL
VLDLC SERPL CALC-MCNC: 32.8 MG/DL

## 2023-05-12 PROCEDURE — 83036 HEMOGLOBIN GLYCOSYLATED A1C: CPT

## 2023-05-12 PROCEDURE — 36415 COLL VENOUS BLD VENIPUNCTURE: CPT

## 2023-05-12 PROCEDURE — 80053 COMPREHEN METABOLIC PANEL: CPT

## 2023-05-12 PROCEDURE — 80061 LIPID PANEL: CPT

## 2023-06-05 ENCOUNTER — OFFICE VISIT (OUTPATIENT)
Age: 68
End: 2023-06-05
Payer: MEDICARE

## 2023-06-05 VITALS
BODY MASS INDEX: 27.22 KG/M2 | HEIGHT: 72 IN | DIASTOLIC BLOOD PRESSURE: 61 MMHG | WEIGHT: 201 LBS | SYSTOLIC BLOOD PRESSURE: 106 MMHG | HEART RATE: 58 BPM

## 2023-06-05 DIAGNOSIS — I25.10 ATHEROSCLEROSIS OF NATIVE CORONARY ARTERY OF NATIVE HEART WITHOUT ANGINA PECTORIS: Primary | ICD-10-CM

## 2023-06-05 DIAGNOSIS — Z95.5 HISTORY OF CORONARY ARTERY STENT PLACEMENT: ICD-10-CM

## 2023-06-05 DIAGNOSIS — E11.9 TYPE 2 DIABETES MELLITUS WITHOUT COMPLICATION, WITHOUT LONG-TERM CURRENT USE OF INSULIN (HCC): ICD-10-CM

## 2023-06-05 DIAGNOSIS — Z85.528 HISTORY OF RENAL CELL CANCER: ICD-10-CM

## 2023-06-05 DIAGNOSIS — E78.00 HYPERCHOLESTEREMIA: ICD-10-CM

## 2023-06-05 PROBLEM — E11.22 TYPE 2 DIABETES MELLITUS WITH CHRONIC KIDNEY DISEASE (HCC): Status: ACTIVE | Noted: 2023-06-05

## 2023-06-05 PROCEDURE — 1036F TOBACCO NON-USER: CPT | Performed by: INTERNAL MEDICINE

## 2023-06-05 PROCEDURE — 1123F ACP DISCUSS/DSCN MKR DOCD: CPT | Performed by: INTERNAL MEDICINE

## 2023-06-05 PROCEDURE — G8427 DOCREV CUR MEDS BY ELIG CLIN: HCPCS | Performed by: INTERNAL MEDICINE

## 2023-06-05 PROCEDURE — G8417 CALC BMI ABV UP PARAM F/U: HCPCS | Performed by: INTERNAL MEDICINE

## 2023-06-05 PROCEDURE — 2022F DILAT RTA XM EVC RTNOPTHY: CPT | Performed by: INTERNAL MEDICINE

## 2023-06-05 PROCEDURE — 3017F COLORECTAL CA SCREEN DOC REV: CPT | Performed by: INTERNAL MEDICINE

## 2023-06-05 PROCEDURE — 3051F HG A1C>EQUAL 7.0%<8.0%: CPT | Performed by: INTERNAL MEDICINE

## 2023-06-05 PROCEDURE — 99214 OFFICE O/P EST MOD 30 MIN: CPT | Performed by: INTERNAL MEDICINE

## 2023-06-05 ASSESSMENT — PATIENT HEALTH QUESTIONNAIRE - PHQ9
SUM OF ALL RESPONSES TO PHQ9 QUESTIONS 1 & 2: 0
SUM OF ALL RESPONSES TO PHQ QUESTIONS 1-9: 0
2. FEELING DOWN, DEPRESSED OR HOPELESS: 0
1. LITTLE INTEREST OR PLEASURE IN DOING THINGS: 0
SUM OF ALL RESPONSES TO PHQ QUESTIONS 1-9: 0
DEPRESSION UNABLE TO ASSESS: FUNCTIONAL CAPACITY MOTIVATION LIMITS ACCURACY

## 2023-06-05 ASSESSMENT — ENCOUNTER SYMPTOMS
GASTROINTESTINAL NEGATIVE: 1
EYES NEGATIVE: 1
RESPIRATORY NEGATIVE: 1

## 2023-06-05 NOTE — PROGRESS NOTES
1. Have you been to the ER, urgent care clinic since your last visit? Hospitalized since your last visit? No      2. Where do you normally have your labs drawn? [facility]    3. Do you need any refills today? No    4. Which local pharmacy do you use (enter pharmacy)? [facility]         5. Which mail order pharmacy do you use (enter pharmacy)? [facility]    6. Are you here for surgical clearance and if so who will be doing your     procedure/surgery (care team)?    No
stenosis ( mid D1 90-95%) with preserved LV function. S/p ptca/stent ( [facility]ENI). Lesion reduced to 0%. Continue DAPT/ intense risk factor modification. Signed by: Hector Salmeron MD on 1/10/2022  9:57 AM        ASSESSMENT & PLAN    Lab Results   Component Value Date    CHOL 113 05/12/2023    CHOL 135 01/16/2023    CHOL 136 01/16/2023    CHOL 135 09/03/2022    CHOL 141 12/13/2021    CHOL 157 08/10/2021    CHOL 153 06/15/2020    CHOL 158 02/20/2020     Lab Results   Component Value Date    TRIG 164 (H) 05/12/2023    TRIG 249 (H) 01/16/2023    TRIG 261 (H) 01/16/2023    TRIG 458 (H) 09/03/2022    TRIG 276 (H) 12/13/2021    TRIG 153 (H) 08/10/2021    TRIG 280 (H) 06/15/2020    TRIG 227 (H) 02/20/2020     Lab Results   Component Value Date    HDL 47 05/12/2023    HDL 45 01/16/2023    HDL 44 01/16/2023    HDL 36 (L) 09/03/2022    HDL 41 12/13/2021    HDL 50 08/10/2021    HDL 40 06/15/2020    HDL 44 02/20/2020     Lab Results   Component Value Date    LDLCALC 33.2 05/12/2023    LDLCALC 40.2 01/16/2023    LDLCALC 39.8 01/16/2023    LDLCALC  09/03/2022     LDL AND VLDL CHOLESTEROL NOT CALCULATED WHEN TRIGLYCERIDES >400 MG/DL OR HDL CHOLESTEROL <20 MG/DL    LDLCALC 44.8 12/13/2021    LDLCALC 76.4 08/10/2021    LDLCALC 57 06/15/2020    LDLCALC 68.6 02/20/2020     Lab Results   Component Value Date    LABVLDL 32.8 05/12/2023    LABVLDL 49.8 01/16/2023    LABVLDL 52.2 01/16/2023    LABVLDL  09/03/2022     Calculation not valid with this patient's other Lipid values. LABVLDL 55.2 12/13/2021    LABVLDL 30.6 08/10/2021    LABVLDL 56 06/15/2020    LABVLDL 45.4 02/20/2020        Patient is a 59-year-old male with history of diabetes, dyslipidemia seen for recurrent episodes of left-sided chest tightness occurring both at rest and exertion lasting few minutes. Also reports intermittent upper back discomfort. CCS 3. Reports exertional dyspnea at moderate activity. No orthopnea or PND.   Underwent recent stress test which

## 2023-06-10 RX ORDER — SITAGLIPTIN 100 MG/1
TABLET, FILM COATED ORAL
Qty: 90 TABLET | Refills: 3 | Status: SHIPPED | OUTPATIENT
Start: 2023-06-10

## 2023-07-13 RX ORDER — ROSUVASTATIN CALCIUM 20 MG/1
TABLET, COATED ORAL
Qty: 90 TABLET | Refills: 3 | Status: SHIPPED | OUTPATIENT
Start: 2023-07-13

## 2023-07-13 NOTE — TELEPHONE ENCOUNTER
Last Visit: 06- OV   Next Appointment: 10-  Previous Refill Encounter: 05- #90 tabs with 3 refills        Requested Prescriptions     Pending Prescriptions Disp Refills    metoprolol tartrate (LOPRESSOR) 25 MG tablet [Pharmacy Med Name: METOPROLOL TARTRATE TABS 25MG] 90 tablet 3     Sig: TAKE ONE-HALF (1/2) TABLET TWICE A DAY

## 2023-08-16 NOTE — TELEPHONE ENCOUNTER
Last Visit: 06- OV   Next Appointment: 10-  Previous Refill Encounter: 09- #90 tabs with 3 refills      Requested Prescriptions     Pending Prescriptions Disp Refills    JARDIANCE 25 MG tablet [Pharmacy Med Name: Hannah Million TABS 25MG] 90 tablet 3     Sig: TAKE 1 TABLET DAILY

## 2023-08-23 RX ORDER — EMPAGLIFLOZIN 25 MG/1
TABLET, FILM COATED ORAL
Qty: 90 TABLET | Refills: 3 | Status: SHIPPED | OUTPATIENT
Start: 2023-08-23

## 2023-08-30 ENCOUNTER — OFFICE VISIT (OUTPATIENT)
Age: 68
End: 2023-08-30
Payer: MEDICARE

## 2023-08-30 VITALS
HEART RATE: 74 BPM | OXYGEN SATURATION: 96 % | RESPIRATION RATE: 18 BRPM | SYSTOLIC BLOOD PRESSURE: 123 MMHG | HEIGHT: 72 IN | TEMPERATURE: 97.3 F | WEIGHT: 202 LBS | BODY MASS INDEX: 27.36 KG/M2 | DIASTOLIC BLOOD PRESSURE: 78 MMHG

## 2023-08-30 DIAGNOSIS — M43.6 STIFF NECK: Primary | ICD-10-CM

## 2023-08-30 PROCEDURE — 3017F COLORECTAL CA SCREEN DOC REV: CPT | Performed by: FAMILY MEDICINE

## 2023-08-30 PROCEDURE — G8427 DOCREV CUR MEDS BY ELIG CLIN: HCPCS | Performed by: FAMILY MEDICINE

## 2023-08-30 PROCEDURE — 99214 OFFICE O/P EST MOD 30 MIN: CPT | Performed by: FAMILY MEDICINE

## 2023-08-30 PROCEDURE — 1036F TOBACCO NON-USER: CPT | Performed by: FAMILY MEDICINE

## 2023-08-30 PROCEDURE — 1123F ACP DISCUSS/DSCN MKR DOCD: CPT | Performed by: FAMILY MEDICINE

## 2023-08-30 PROCEDURE — G8417 CALC BMI ABV UP PARAM F/U: HCPCS | Performed by: FAMILY MEDICINE

## 2023-08-30 RX ORDER — BACLOFEN 5 MG/1
5 TABLET ORAL 2 TIMES DAILY PRN
Qty: 30 TABLET | Refills: 0 | Status: SHIPPED | OUTPATIENT
Start: 2023-08-30

## 2023-08-30 RX ORDER — METHYLPREDNISOLONE 4 MG/1
TABLET ORAL
Qty: 1 KIT | Refills: 0 | Status: SHIPPED | OUTPATIENT
Start: 2023-08-30 | End: 2023-09-05

## 2023-08-30 NOTE — PROGRESS NOTES
1. \"Have you been to the ER, urgent care clinic since your last visit? Hospitalized since your last visit? \" No    2. \"Have you seen or consulted any other health care providers outside of the [address] [place] [address] since your last visit? \" No     3. For patients aged 43-73: Has the patient had a colonoscopy / FIT/ Cologuard? Yes - Care Gap present.  Most recent result on file

## 2023-08-30 NOTE — PROGRESS NOTES
HPI:  Quyen Dooley is a 79 y.o. male who presents today with   Chief Complaint   Patient presents with    Torticollis     x5 days, unresolved        C/o stiff neck since last Friday . SX have worsened over the weekend. Took two old flexeril and his sx improved some. Over the last two days sx have worsened. [place] lyrica , valium and motrin. These did not help long term. Does not recall an injury. Hemoglobin A1C   Date Value Ref Range Status   05/12/2023 7.7 (H) 4.2 - 5.6 % Final     Comment:     (NOTE)  HbA1C Interpretive Ranges  <5.7              Normal  5.7 - 6.4         Consider Prediabetes  >6.5              Consider Diabetes                 PMH,  Meds, Allergies, Family History, Social history reviewed      Current Outpatient Medications   Medication Sig Dispense Refill    JARDIANCE 25 MG tablet TAKE 1 TABLET DAILY 90 tablet 3    metoprolol tartrate (LOPRESSOR) 25 MG tablet Take 0.5 tablets by mouth 2 times daily 90 tablet 3    rosuvastatin (CRESTOR) 20 MG tablet TAKE 1 TABLET NIGHTLY 90 tablet 3    JANUVIA 100 MG tablet TAKE 1 TABLET DAILY 90 tablet 3    blood glucose test strips (EXACTECH TEST) strip Blood sugar check daily. FreeStyle Lancets MISC USE TO CHECK BLOOD SUGAR DAILY      aspirin 81 MG chewable tablet Take 1 tablet by mouth daily      colchicine (COLCRYS) 0.6 MG tablet Take 2 tabs PO X 1 ; may repeat 1 tab PO in one hour X1 prn continued gout. diazePAM (VALIUM) 5 MG tablet Take 1 tablet by mouth.      lisinopril (PRINIVIL;ZESTRIL) 5 MG tablet TAKE 1 TABLET DAILY      metFORMIN (GLUCOPHAGE) 500 MG tablet TAKE 2 TABLETS IN THE MORNING AND 2 TABLETS IN THE EVENING      nystatin (MYCOSTATIN) 229241 UNIT/GM powder APPLY TO THE AFFECTED AREA FOUR TIMES A DAY       No current facility-administered medications for this visit.         No Known Allergies               Review of Systems   as per HPI        /78 (Site: Left Upper Arm, Position: Sitting, Cuff Size: Large Adult)   Pulse

## 2023-09-16 NOTE — PROGRESS NOTES
HISTORY OF PRESENT ILLNESS  Rodrigo Wynn is a 63 y.o. male. HPI  Patient is here today for evaluation and treatment of: Diabetes/Left Heel Spur    Diabetes: he was started on jardiance at his last visit. Blood sugars are better controlled. States that blood sugar was 130s today. Lab Results   Component Value Date/Time    Hemoglobin A1c 8.3 (H) 11/07/2019 08:23 AM    Hemoglobin A1c (POC) 6.4 04/21/2015 08:53 AM          Left Heel Spur: went to the ED on Monday for left foot pain. Took aleve and some colchicine and sx improved. Ruled out for DVT. He has seen podiatry in the past.  His 5th metarsal area is sore and tender. He has a heel spur by imaging. Pain is more so across the the top of the foot. Does recall stumbling on Sunday. BP is also stable; He is on meds as listed below. For cholesterol ; pt continues on simvastatin; takes med daily. Current Outpatient Medications:     predniSONE (DELTASONE) 10 mg tablet, Take as directed., Disp: , Rfl:     diclofenac (VOLTAREN) 1 % gel, Apply 2 g to affected area., Disp: , Rfl:     lancets (BD ULTRA-FINE II LANCETS) 30 gauge misc, Blood sugar check daily, Disp: 100 Lancet, Rfl: 3    empagliflozin (JARDIANCE) 10 mg tablet, Take 1 Tab by mouth daily. , Disp: 90 Tab, Rfl: 1    simvastatin (ZOCOR) 20 mg tablet, TAKE 1 TABLET NIGHTLY, Disp: 90 Tab, Rfl: 4    glucose blood VI test strips (FREESTYLE LITE STRIPS) strip, Blood sugar check daily. , Disp: 100 Strip, Rfl: 3    lisinopril (PRINIVIL, ZESTRIL) 5 mg tablet, TAKE 1 TABLET DAILY, Disp: 90 Tab, Rfl: 4    metFORMIN (GLUCOPHAGE) 500 mg tablet, TAKE 2 TABLETS IN THE MORNING AND 2 TABLETS IN THE EVENING, Disp: 360 Tab, Rfl: 4    diazePAM (VALIUM) 5 mg tablet, TAKE ONE TABLET BY MOUTH EVERY 12 HOURS AS NEEDED, Disp: 60 Tab, Rfl: 1    colchicine 0.6 mg tablet, Take 2 tablets X 1 at onset of pain.   May repeat 1 tab in one hour prn pain X1, Disp: 30 Tab, Rfl: 0    SITagliptin (JANUVIA) 100 mg tablet, TAKE 1 TABLET DAILY, Disp: 90 Tab, Rfl: 3    aspirin delayed-release 81 mg tablet, Take 81 mg by mouth daily. , Disp: , Rfl:       PMH,  Meds, Allergies, Family History, Social history reviewed    Review of Systems   Constitutional: Negative for chills and fever. Respiratory: Negative for shortness of breath and wheezing. Cardiovascular: Negative for chest pain and palpitations. Physical Exam  Constitutional:       Appearance: Normal appearance. He is not ill-appearing. Cardiovascular:      Rate and Rhythm: Normal rate and regular rhythm. Heart sounds: No murmur. No friction rub. No gallop. Pulmonary:      Effort: No respiratory distress. Breath sounds: No wheezing. Neurological:      Mental Status: He is alert. left foot; No edema; Pedal pulses are 2+; ROM intact; No erythema; + TTP at the dorsal 5th metatarsal area  Visit Vitals  /67 (BP 1 Location: Right arm, BP Patient Position: Sitting)   Pulse 65   Temp 98 °F (36.7 °C) (Oral)   Resp 16   Ht 6' 1\" (1.854 m)   Wt 211 lb (95.7 kg)   SpO2 100%   BMI 27.84 kg/m²         ASSESSMENT and PLAN    ICD-10-CM ICD-9-CM    1. Type 2 diabetes mellitus with hyperglycemia, without long-term current use of insulin ([facility]) E11.65 250.00 LIPID PANEL     524.01 METABOLIC PANEL, BASIC      HEMOGLOBIN A1C WITH EAG   2. Hypercholesterolemia E78.00 272.0 AST      ALT   3. Essential hypertension I10 401.9    4.  Left foot pain- new M79.672 729.5 URIC ACID      REFERRAL TO ORTHOPEDICS       As above,    treatment plan as listed below  Orders Placed This Encounter    LIPID PANEL    METABOLIC PANEL, BASIC    AST    ALT    HEMOGLOBIN A1C WITH EAG    URIC ACID    [facility] [address]    predniSONE (DELTASONE) 10 mg tablet    diclofenac (VOLTAREN) 1 % gel    colchicine 0.6 mg tablet     Ortho consult  Refilled colchicine  Labs as ordered  Follow-up and Dispositions    · Return in about 4 months (around 6/20/2020) for diabetes, htn, Chol. An After Visit Summary was printed and given to the patient. This has been fully explained to the patient, who indicates understanding. Jonathan Kahn Abdifatah  Plastic Surgery  [address] [place] [address]  Phone: [phone]  Fax: [phone]  Follow Up Time:

## 2023-09-26 ENCOUNTER — HOSPITAL ENCOUNTER (OUTPATIENT)
Facility: HOSPITAL | Age: 68
Discharge: HOME | End: 2023-09-29
Payer: MEDICARE

## 2023-09-26 ENCOUNTER — APPOINTMENT (OUTPATIENT)
Age: 68
End: 2023-09-26
Payer: MEDICARE

## 2023-09-26 DIAGNOSIS — E11.65 TYPE 2 DIABETES MELLITUS WITH HYPERGLYCEMIA, WITHOUT LONG-TERM CURRENT USE OF INSULIN (HCC): ICD-10-CM

## 2023-09-26 LAB
ALBUMIN SERPL-MCNC: 3.9 G/DL (ref 3.4–5)
ALBUMIN/GLOB SERPL: 1.2 (ref 0.8–1.7)
ALP SERPL-CCNC: 61 U/L (ref 45–117)
ALT SERPL-CCNC: 24 U/L (ref 16–61)
ANION GAP SERPL CALC-SCNC: 7 MMOL/L (ref 3–18)
AST SERPL-CCNC: 15 U/L (ref 10–38)
BILIRUB SERPL-MCNC: 0.6 MG/DL (ref 0.2–1)
BUN SERPL-MCNC: 10 MG/DL (ref 7–18)
BUN/CREAT SERPL: 11 (ref 12–20)
CALCIUM SERPL-MCNC: 9.5 MG/DL (ref 8.5–10.1)
CHLORIDE SERPL-SCNC: 106 MMOL/L (ref 100–111)
CHOLEST SERPL-MCNC: 105 MG/DL
CO2 SERPL-SCNC: 27 MMOL/L (ref 21–32)
CREAT SERPL-MCNC: 0.93 MG/DL (ref 0.6–1.3)
EST. AVERAGE GLUCOSE BLD GHB EST-MCNC: 197 MG/DL
GLOBULIN SER CALC-MCNC: 3.3 G/DL (ref 2–4)
GLUCOSE SERPL-MCNC: 177 MG/DL (ref 74–99)
HBA1C MFR BLD: 8.5 % (ref 4.2–5.6)
HDLC SERPL-MCNC: 46 MG/DL (ref 40–60)
HDLC SERPL: 2.3 (ref 0–5)
LDLC SERPL CALC-MCNC: 19 MG/DL (ref 0–100)
LIPID PANEL: ABNORMAL
POTASSIUM SERPL-SCNC: 4.3 MMOL/L (ref 3.5–5.5)
PROT SERPL-MCNC: 7.2 G/DL (ref 6.4–8.2)
SODIUM SERPL-SCNC: 140 MMOL/L (ref 136–145)
TRIGL SERPL-MCNC: 200 MG/DL
VLDLC SERPL CALC-MCNC: 40 MG/DL

## 2023-09-26 PROCEDURE — 36415 COLL VENOUS BLD VENIPUNCTURE: CPT

## 2023-09-26 PROCEDURE — 80053 COMPREHEN METABOLIC PANEL: CPT

## 2023-09-26 PROCEDURE — 83036 HEMOGLOBIN GLYCOSYLATED A1C: CPT

## 2023-09-26 PROCEDURE — 80061 LIPID PANEL: CPT

## 2023-10-12 ENCOUNTER — OFFICE VISIT (OUTPATIENT)
Age: 68
End: 2023-10-12
Payer: MEDICARE

## 2023-10-12 VITALS
TEMPERATURE: 97.7 F | OXYGEN SATURATION: 95 % | BODY MASS INDEX: 26.84 KG/M2 | DIASTOLIC BLOOD PRESSURE: 71 MMHG | WEIGHT: 198.2 LBS | HEIGHT: 72 IN | HEART RATE: 64 BPM | RESPIRATION RATE: 16 BRPM | SYSTOLIC BLOOD PRESSURE: 112 MMHG

## 2023-10-12 DIAGNOSIS — Z87.891 HISTORY OF TOBACCO USE: ICD-10-CM

## 2023-10-12 DIAGNOSIS — M25.511 CHRONIC PAIN OF BOTH SHOULDERS: ICD-10-CM

## 2023-10-12 DIAGNOSIS — E11.65 TYPE 2 DIABETES MELLITUS WITH HYPERGLYCEMIA, WITHOUT LONG-TERM CURRENT USE OF INSULIN (HCC): ICD-10-CM

## 2023-10-12 DIAGNOSIS — Z00.00 MEDICARE ANNUAL WELLNESS VISIT, SUBSEQUENT: Primary | ICD-10-CM

## 2023-10-12 DIAGNOSIS — G89.29 CHRONIC PAIN OF BOTH SHOULDERS: ICD-10-CM

## 2023-10-12 DIAGNOSIS — M25.512 CHRONIC PAIN OF BOTH SHOULDERS: ICD-10-CM

## 2023-10-12 PROCEDURE — G0296 VISIT TO DETERM LDCT ELIG: HCPCS | Performed by: FAMILY MEDICINE

## 2023-10-12 ASSESSMENT — PATIENT HEALTH QUESTIONNAIRE - PHQ9
SUM OF ALL RESPONSES TO PHQ QUESTIONS 1-9: 0
SUM OF ALL RESPONSES TO PHQ QUESTIONS 1-9: 0
SUM OF ALL RESPONSES TO PHQ9 QUESTIONS 1 & 2: 0
SUM OF ALL RESPONSES TO PHQ QUESTIONS 1-9: 0
1. LITTLE INTEREST OR PLEASURE IN DOING THINGS: 0
SUM OF ALL RESPONSES TO PHQ QUESTIONS 1-9: 0
2. FEELING DOWN, DEPRESSED OR HOPELESS: 0

## 2023-10-12 ASSESSMENT — ANXIETY QUESTIONNAIRES
IF YOU CHECKED OFF ANY PROBLEMS ON THIS QUESTIONNAIRE, HOW DIFFICULT HAVE THESE PROBLEMS MADE IT FOR YOU TO DO YOUR WORK, TAKE CARE OF THINGS AT HOME, OR GET ALONG WITH OTHER PEOPLE: NOT DIFFICULT AT ALL
6. BECOMING EASILY ANNOYED OR IRRITABLE: 0
2. NOT BEING ABLE TO STOP OR CONTROL WORRYING: 0
1. FEELING NERVOUS, ANXIOUS, OR ON EDGE: 0
5. BEING SO RESTLESS THAT IT IS HARD TO SIT STILL: 0
4. TROUBLE RELAXING: 0
GAD7 TOTAL SCORE: 0
7. FEELING AFRAID AS IF SOMETHING AWFUL MIGHT HAPPEN: 0
3. WORRYING TOO MUCH ABOUT DIFFERENT THINGS: 0

## 2023-10-12 ASSESSMENT — LIFESTYLE VARIABLES
HOW OFTEN DO YOU HAVE A DRINK CONTAINING ALCOHOL: NEVER
HOW MANY STANDARD DRINKS CONTAINING ALCOHOL DO YOU HAVE ON A TYPICAL DAY: PATIENT DOES NOT DRINK

## 2023-12-08 NOTE — TELEPHONE ENCOUNTER
Last Visit: 10- OV   Next Appointment: 02-  Previous Refill Encounter: 12- #90 tabs with 3 refills      Requested Prescriptions     Pending Prescriptions Disp Refills    lisinopril (PRINIVIL;ZESTRIL) 5 MG tablet [Pharmacy Med Name: LISINOPRIL TABS 5MG] 90 tablet 3     Sig: TAKE 1 TABLET DAILY

## 2023-12-12 ENCOUNTER — HOSPITAL ENCOUNTER (OUTPATIENT)
Facility: HOSPITAL | Age: 68
Discharge: HOME | End: 2023-12-15
Payer: MEDICARE

## 2023-12-12 ENCOUNTER — APPOINTMENT (OUTPATIENT)
Age: 68
End: 2023-12-12
Payer: MEDICARE

## 2023-12-12 DIAGNOSIS — E11.65 TYPE 2 DIABETES MELLITUS WITH HYPERGLYCEMIA, WITHOUT LONG-TERM CURRENT USE OF INSULIN (HCC): ICD-10-CM

## 2023-12-12 LAB
EST. AVERAGE GLUCOSE BLD GHB EST-MCNC: 194 MG/DL
HBA1C MFR BLD: 8.4 % (ref 4.2–5.6)

## 2023-12-12 PROCEDURE — 36415 COLL VENOUS BLD VENIPUNCTURE: CPT

## 2023-12-12 PROCEDURE — 83036 HEMOGLOBIN GLYCOSYLATED A1C: CPT

## 2023-12-12 RX ORDER — LISINOPRIL 5 MG/1
TABLET ORAL
Qty: 90 TABLET | Refills: 3 | Status: SHIPPED | OUTPATIENT
Start: 2023-12-12

## 2024-01-03 ENCOUNTER — TELEPHONE (OUTPATIENT)
Age: 69
End: 2024-01-03

## 2024-01-03 RX ORDER — GLUCOSAMINE HCL/CHONDROITIN SU 500-400 MG
CAPSULE ORAL
Refills: 0 | Status: CANCELLED | OUTPATIENT
Start: 2024-01-03

## 2024-01-03 NOTE — TELEPHONE ENCOUNTER
Last appointment: 10/12/2023    Next appointment: 02/15/2024    Pharmacy requesting 90 day supply for     E[employer] 100'S    Pharmacy   [facility] - [place], [place] [address] -  [phone] - F [phone] (Pharmacy)

## 2024-01-03 NOTE — TELEPHONE ENCOUNTER
-- DO NOT REPLY / DO NOT REPLY ALL --  -- Message is from the [facility]--    COVID-19 Universal Screening: Negative    General Patient Message      Reason for Call: Pt wants to know why was another referral sent out for diabetic doctor. Pt needs referral for ophthalmology . Please call pt back.     Caller Information       Type Contact Phone    05/26/2020 02:25 PM Phone (Incoming) Gold Bullock (Self) [phone] (H)          Alternative phone number: none    Turnaround time given to caller:   \"This message will be sent to [state Provider's name]. The clinical team will fulfill your request as soon as they review your message.\"     The message has been forwarded to the provider for review, thank you.

## 2024-01-04 LAB — DIABETIC RETINOPATHY: NEGATIVE

## 2024-01-08 RX ORDER — BLOOD-GLUCOSE METER
KIT MISCELLANEOUS
Qty: 100 EACH | Refills: 3 | Status: SHIPPED | OUTPATIENT
Start: 2024-01-08

## 2024-01-25 NOTE — TELEPHONE ENCOUNTER
Last Visit: 10/12/2023   Next Appointment: 02/15/2024    Requested Prescriptions     Pending Prescriptions Disp Refills    metFORMIN (GLUCOPHAGE) 500 MG tablet [Pharmacy Med Name: METFORMIN HCL TABS 500MG] 360 tablet 3     Sig: TAKE 2 TABLETS IN THE MORNING AND 2 TABLETS IN THE EVENING

## 2024-03-20 ENCOUNTER — OFFICE VISIT (OUTPATIENT)
Age: 69
End: 2024-03-20
Payer: MEDICARE

## 2024-03-20 ENCOUNTER — HOSPITAL ENCOUNTER (OUTPATIENT)
Facility: HOSPITAL | Age: 69
Setting detail: SPECIMEN
Discharge: HOME | End: 2024-03-23
Payer: MEDICARE

## 2024-03-20 VITALS
BODY MASS INDEX: 26.79 KG/M2 | TEMPERATURE: 97.5 F | HEART RATE: 86 BPM | SYSTOLIC BLOOD PRESSURE: 112 MMHG | OXYGEN SATURATION: 96 % | HEIGHT: 72 IN | RESPIRATION RATE: 16 BRPM | DIASTOLIC BLOOD PRESSURE: 72 MMHG | WEIGHT: 197.8 LBS

## 2024-03-20 DIAGNOSIS — M62.838 MUSCLE SPASM: ICD-10-CM

## 2024-03-20 DIAGNOSIS — Z91.81 AT HIGH RISK FOR FALLS: ICD-10-CM

## 2024-03-20 DIAGNOSIS — M79.10 MYALGIA: ICD-10-CM

## 2024-03-20 DIAGNOSIS — C64.2 MALIGNANT NEOPLASM OF LEFT KIDNEY, EXCEPT RENAL PELVIS (HCC): ICD-10-CM

## 2024-03-20 DIAGNOSIS — E11.65 TYPE 2 DIABETES MELLITUS WITH HYPERGLYCEMIA, WITHOUT LONG-TERM CURRENT USE OF INSULIN (HCC): Primary | ICD-10-CM

## 2024-03-20 PROCEDURE — 3046F HEMOGLOBIN A1C LEVEL >9.0%: CPT | Performed by: FAMILY MEDICINE

## 2024-03-20 PROCEDURE — 1036F TOBACCO NON-USER: CPT | Performed by: FAMILY MEDICINE

## 2024-03-20 PROCEDURE — G8417 CALC BMI ABV UP PARAM F/U: HCPCS | Performed by: FAMILY MEDICINE

## 2024-03-20 PROCEDURE — G8484 FLU IMMUNIZE NO ADMIN: HCPCS | Performed by: FAMILY MEDICINE

## 2024-03-20 PROCEDURE — 99214 OFFICE O/P EST MOD 30 MIN: CPT | Performed by: FAMILY MEDICINE

## 2024-03-20 PROCEDURE — 2022F DILAT RTA XM EVC RTNOPTHY: CPT | Performed by: FAMILY MEDICINE

## 2024-03-20 PROCEDURE — 82043 UR ALBUMIN QUANTITATIVE: CPT

## 2024-03-20 PROCEDURE — 3017F COLORECTAL CA SCREEN DOC REV: CPT | Performed by: FAMILY MEDICINE

## 2024-03-20 PROCEDURE — 1123F ACP DISCUSS/DSCN MKR DOCD: CPT | Performed by: FAMILY MEDICINE

## 2024-03-20 PROCEDURE — G8427 DOCREV CUR MEDS BY ELIG CLIN: HCPCS | Performed by: FAMILY MEDICINE

## 2024-03-20 PROCEDURE — 82570 ASSAY OF URINE CREATININE: CPT

## 2024-03-20 RX ORDER — NYSTATIN 100000 [USP'U]/G
POWDER TOPICAL
Qty: 1 EACH | Refills: 3 | Status: SHIPPED | OUTPATIENT
Start: 2024-03-20

## 2024-03-20 RX ORDER — DIAZEPAM 5 MG/1
5 TABLET ORAL EVERY 12 HOURS PRN
Qty: 30 TABLET | Refills: 1 | Status: SHIPPED | OUTPATIENT
Start: 2024-03-20 | End: 2024-05-19

## 2024-03-20 NOTE — PROGRESS NOTES
1. \"Have you been to the ER, urgent care clinic since your last visit?  Hospitalized since your last visit?\" No    2. \"Have you seen or consulted any other health care providers outside of the [facility] since your last visit?\" No     3. For patients aged 45-75: Has the patient had a colonoscopy / FIT/ Cologuard? Yes - Care Gap present. Most recent result on file
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
## 2024-03-21 LAB
CREAT UR-MCNC: 50 MG/DL (ref 30–125)
MICROALBUMIN UR-MCNC: 6.8 MG/DL (ref 0–3)
MICROALBUMIN/CREAT UR-RTO: 136 MG/G (ref 0–30)

## 2024-04-18 ENCOUNTER — HOSPITAL ENCOUNTER (OUTPATIENT)
Facility: HOSPITAL | Age: 69
Setting detail: SPECIMEN
Discharge: HOME | End: 2024-04-18
Payer: MEDICARE

## 2024-04-18 DIAGNOSIS — E11.65 TYPE 2 DIABETES MELLITUS WITH HYPERGLYCEMIA, WITHOUT LONG-TERM CURRENT USE OF INSULIN (HCC): ICD-10-CM

## 2024-04-18 LAB
ALBUMIN SERPL-MCNC: 4.1 G/DL (ref 3.4–5)
ALBUMIN/GLOB SERPL: 1.4 (ref 0.8–1.7)
ALP SERPL-CCNC: 58 U/L (ref 45–117)
ALT SERPL-CCNC: 25 U/L (ref 16–61)
ANION GAP SERPL CALC-SCNC: 4 MMOL/L (ref 3–18)
AST SERPL-CCNC: 16 U/L (ref 10–38)
BILIRUB SERPL-MCNC: 0.4 MG/DL (ref 0.2–1)
BUN SERPL-MCNC: 14 MG/DL (ref 7–18)
BUN/CREAT SERPL: 14 (ref 12–20)
CALCIUM SERPL-MCNC: 9.6 MG/DL (ref 8.5–10.1)
CHLORIDE SERPL-SCNC: 105 MMOL/L (ref 100–111)
CHOLEST SERPL-MCNC: 112 MG/DL
CK SERPL-CCNC: 81 U/L (ref 39–308)
CO2 SERPL-SCNC: 29 MMOL/L (ref 21–32)
CREAT SERPL-MCNC: 0.98 MG/DL (ref 0.6–1.3)
EST. AVERAGE GLUCOSE BLD GHB EST-MCNC: 212 MG/DL
GLOBULIN SER CALC-MCNC: 3 G/DL (ref 2–4)
GLUCOSE SERPL-MCNC: 201 MG/DL (ref 74–99)
HBA1C MFR BLD: 9 % (ref 4.2–5.6)
HDLC SERPL-MCNC: 44 MG/DL (ref 40–60)
HDLC SERPL: 2.5 (ref 0–5)
LDLC SERPL CALC-MCNC: 29.2 MG/DL (ref 0–100)
LIPID PANEL: ABNORMAL
POTASSIUM SERPL-SCNC: 4.9 MMOL/L (ref 3.5–5.5)
PROT SERPL-MCNC: 7.1 G/DL (ref 6.4–8.2)
SODIUM SERPL-SCNC: 138 MMOL/L (ref 136–145)
TRIGL SERPL-MCNC: 194 MG/DL
TSH SERPL DL<=0.05 MIU/L-ACNC: 1.02 UIU/ML (ref 0.36–3.74)
VLDLC SERPL CALC-MCNC: 38.8 MG/DL

## 2024-04-18 PROCEDURE — 36415 COLL VENOUS BLD VENIPUNCTURE: CPT

## 2024-04-18 PROCEDURE — 84443 ASSAY THYROID STIM HORMONE: CPT

## 2024-04-18 PROCEDURE — 83036 HEMOGLOBIN GLYCOSYLATED A1C: CPT

## 2024-04-18 PROCEDURE — 82550 ASSAY OF CK (CPK): CPT

## 2024-04-18 PROCEDURE — 80053 COMPREHEN METABOLIC PANEL: CPT

## 2024-04-18 PROCEDURE — 80061 LIPID PANEL: CPT

## 2024-06-05 ENCOUNTER — OFFICE VISIT (OUTPATIENT)
Age: 69
End: 2024-06-05
Payer: MEDICARE

## 2024-06-05 VITALS
HEIGHT: 72 IN | SYSTOLIC BLOOD PRESSURE: 112 MMHG | HEART RATE: 77 BPM | OXYGEN SATURATION: 97 % | DIASTOLIC BLOOD PRESSURE: 61 MMHG | BODY MASS INDEX: 26.95 KG/M2 | WEIGHT: 199 LBS

## 2024-06-05 DIAGNOSIS — E11.9 TYPE 2 DIABETES MELLITUS WITHOUT COMPLICATION, WITHOUT LONG-TERM CURRENT USE OF INSULIN (HCC): ICD-10-CM

## 2024-06-05 DIAGNOSIS — I25.10 ATHEROSCLEROSIS OF NATIVE CORONARY ARTERY OF NATIVE HEART WITHOUT ANGINA PECTORIS: Primary | ICD-10-CM

## 2024-06-05 DIAGNOSIS — E78.2 MIXED HYPERLIPIDEMIA: ICD-10-CM

## 2024-06-05 DIAGNOSIS — Z95.5 HISTORY OF CORONARY ARTERY STENT PLACEMENT: ICD-10-CM

## 2024-06-05 DIAGNOSIS — Z85.528 HISTORY OF RENAL CELL CANCER: ICD-10-CM

## 2024-06-05 PROCEDURE — 2022F DILAT RTA XM EVC RTNOPTHY: CPT | Performed by: INTERNAL MEDICINE

## 2024-06-05 PROCEDURE — G8417 CALC BMI ABV UP PARAM F/U: HCPCS | Performed by: INTERNAL MEDICINE

## 2024-06-05 PROCEDURE — 3017F COLORECTAL CA SCREEN DOC REV: CPT | Performed by: INTERNAL MEDICINE

## 2024-06-05 PROCEDURE — 99214 OFFICE O/P EST MOD 30 MIN: CPT | Performed by: INTERNAL MEDICINE

## 2024-06-05 PROCEDURE — 1036F TOBACCO NON-USER: CPT | Performed by: INTERNAL MEDICINE

## 2024-06-05 PROCEDURE — G8427 DOCREV CUR MEDS BY ELIG CLIN: HCPCS | Performed by: INTERNAL MEDICINE

## 2024-06-05 PROCEDURE — 3052F HG A1C>EQUAL 8.0%<EQUAL 9.0%: CPT | Performed by: INTERNAL MEDICINE

## 2024-06-05 PROCEDURE — 1123F ACP DISCUSS/DSCN MKR DOCD: CPT | Performed by: INTERNAL MEDICINE

## 2024-06-05 PROCEDURE — 93000 ELECTROCARDIOGRAM COMPLETE: CPT | Performed by: INTERNAL MEDICINE

## 2024-06-05 ASSESSMENT — PATIENT HEALTH QUESTIONNAIRE - PHQ9
1. LITTLE INTEREST OR PLEASURE IN DOING THINGS: NOT AT ALL
SUM OF ALL RESPONSES TO PHQ QUESTIONS 1-9: 0
2. FEELING DOWN, DEPRESSED OR HOPELESS: NOT AT ALL
DEPRESSION UNABLE TO ASSESS: FUNCTIONAL CAPACITY MOTIVATION LIMITS ACCURACY
SUM OF ALL RESPONSES TO PHQ QUESTIONS 1-9: 0
SUM OF ALL RESPONSES TO PHQ9 QUESTIONS 1 & 2: 0

## 2024-06-05 ASSESSMENT — ENCOUNTER SYMPTOMS
RESPIRATORY NEGATIVE: 1
GASTROINTESTINAL NEGATIVE: 1
EYES NEGATIVE: 1

## 2024-06-05 NOTE — PROGRESS NOTES
1. Have you been to the ER, urgent care clinic since your last visit?  Hospitalized since your last visit?     No      2.  Where do you normally have your labs drawn?   [facility]    3. Do you need any refills today?   No    4. Which local pharmacy do you use (enter pharmacy)?   [facility]    5. Which mail order pharmacy do you use (enter pharmacy)?   [facility]     6. Are you here for surgical clearance and if so who will be doing your     procedure/surgery (care team)?   No

## 2024-06-05 NOTE — PROGRESS NOTES
Dr. Cho notified   Lawson Adams is a 69 y.o. year old male.    Follow-up of CAD, old PCI, hyperlipidemia  History of diabetes.  History of left renal cancer status post partial nephrectomy 2015 12/22 Patient denies significant chest pain, SOB, palpitations, edema, dizziness  6/5/2023 No significant chest pain, shortness of breath, edema, dizziness, palpitations or loss of consciousness. Has severe diffuse myalgias.  6/5/2024 No significant chest pain, shortness of breath, edema, dizziness, palpitations or loss of consciousness.  Myalgias are better but still present.  It is more of arthralgias as per patient.  Has stopped drinking alcohol 1 month ago.  Triglycerides were elevated 6 weeks ago.          Review of Systems   Constitutional: Negative.    HENT: Negative.     Eyes: Negative.    Respiratory: Negative.     Cardiovascular: Negative.    Gastrointestinal: Negative.    Endocrine: Negative.    Genitourinary: Negative.    Musculoskeletal:  Positive for arthralgias and myalgias.   Neurological: Negative.    Psychiatric/Behavioral: Negative.     All other systems reviewed and are negative.        Physical Exam  Vitals and nursing note reviewed.   Constitutional:       Appearance: Normal appearance.   HENT:      Head: Normocephalic and atraumatic.      Nose: Nose normal.   Eyes:      Conjunctiva/sclera: Conjunctivae normal.   Cardiovascular:      Rate and Rhythm: Normal rate and regular rhythm.      Pulses: Normal pulses.      Heart sounds: Normal heart sounds.   Pulmonary:      Effort: Pulmonary effort is normal.      Breath sounds: Normal breath sounds.   Abdominal:      General: Bowel sounds are normal.      Palpations: Abdomen is soft.   Musculoskeletal:         General: Normal range of motion.      Right lower leg: No edema.      Left lower leg: No edema.   Skin:     General: Skin is warm and dry.   Neurological:      General: No focal deficit present.      Mental Status: He is alert and oriented to person, place, and time.

## 2024-06-24 ENCOUNTER — HOSPITAL ENCOUNTER (OUTPATIENT)
Facility: HOSPITAL | Age: 69
Discharge: HOME | End: 2024-06-27
Payer: MEDICARE

## 2024-06-24 DIAGNOSIS — M79.10 MYALGIA: ICD-10-CM

## 2024-06-24 DIAGNOSIS — E11.65 TYPE 2 DIABETES MELLITUS WITH HYPERGLYCEMIA, WITHOUT LONG-TERM CURRENT USE OF INSULIN (HCC): ICD-10-CM

## 2024-06-24 LAB
ALBUMIN SERPL-MCNC: 3.9 G/DL (ref 3.4–5)
ALBUMIN/GLOB SERPL: 1.2 (ref 0.8–1.7)
ALP SERPL-CCNC: 60 U/L (ref 45–117)
ALT SERPL-CCNC: 25 U/L (ref 16–61)
ANION GAP SERPL CALC-SCNC: 7 MMOL/L (ref 3–18)
AST SERPL-CCNC: 18 U/L (ref 10–38)
BILIRUB SERPL-MCNC: 0.6 MG/DL (ref 0.2–1)
BUN SERPL-MCNC: 15 MG/DL (ref 7–18)
BUN/CREAT SERPL: 16 (ref 12–20)
CALCIUM SERPL-MCNC: 9.3 MG/DL (ref 8.5–10.1)
CHLORIDE SERPL-SCNC: 105 MMOL/L (ref 100–111)
CHOLEST SERPL-MCNC: 118 MG/DL
CK SERPL-CCNC: 102 U/L (ref 39–308)
CO2 SERPL-SCNC: 26 MMOL/L (ref 21–32)
CREAT SERPL-MCNC: 0.94 MG/DL (ref 0.6–1.3)
CREAT UR-MCNC: 62 MG/DL (ref 30–125)
GLOBULIN SER CALC-MCNC: 3.2 G/DL (ref 2–4)
GLUCOSE SERPL-MCNC: 211 MG/DL (ref 74–99)
HDLC SERPL-MCNC: 38 MG/DL (ref 40–60)
HDLC SERPL: 3.1 (ref 0–5)
LDLC SERPL CALC-MCNC: 15.6 MG/DL (ref 0–100)
LIPID PANEL: ABNORMAL
MICROALBUMIN UR-MCNC: 8.62 MG/DL (ref 0–3)
MICROALBUMIN/CREAT UR-RTO: 139 MG/G (ref 0–30)
POTASSIUM SERPL-SCNC: 4.6 MMOL/L (ref 3.5–5.5)
PROT SERPL-MCNC: 7.1 G/DL (ref 6.4–8.2)
SODIUM SERPL-SCNC: 138 MMOL/L (ref 136–145)
TRIGL SERPL-MCNC: 322 MG/DL
TSH SERPL DL<=0.05 MIU/L-ACNC: 1.05 UIU/ML (ref 0.36–3.74)
VLDLC SERPL CALC-MCNC: 64.4 MG/DL

## 2024-06-24 PROCEDURE — 82550 ASSAY OF CK (CPK): CPT

## 2024-06-24 PROCEDURE — 82043 UR ALBUMIN QUANTITATIVE: CPT

## 2024-06-24 PROCEDURE — 36415 COLL VENOUS BLD VENIPUNCTURE: CPT

## 2024-06-24 PROCEDURE — 80053 COMPREHEN METABOLIC PANEL: CPT

## 2024-06-24 PROCEDURE — 80061 LIPID PANEL: CPT

## 2024-06-24 PROCEDURE — 84443 ASSAY THYROID STIM HORMONE: CPT

## 2024-06-24 PROCEDURE — 82570 ASSAY OF URINE CREATININE: CPT

## 2024-06-26 ENCOUNTER — HOSPITAL ENCOUNTER (OUTPATIENT)
Facility: HOSPITAL | Age: 69
Discharge: HOME | End: 2024-06-29
Payer: MEDICARE

## 2024-06-26 ENCOUNTER — OFFICE VISIT (OUTPATIENT)
Facility: CLINIC | Age: 69
End: 2024-06-26

## 2024-06-26 VITALS
SYSTOLIC BLOOD PRESSURE: 108 MMHG | DIASTOLIC BLOOD PRESSURE: 65 MMHG | TEMPERATURE: 97.5 F | OXYGEN SATURATION: 98 % | RESPIRATION RATE: 18 BRPM | WEIGHT: 197 LBS | HEIGHT: 72 IN | HEART RATE: 54 BPM | BODY MASS INDEX: 26.68 KG/M2

## 2024-06-26 DIAGNOSIS — M79.671 RIGHT FOOT PAIN: ICD-10-CM

## 2024-06-26 DIAGNOSIS — N18.32 TYPE 2 DIABETES MELLITUS WITH STAGE 3B CHRONIC KIDNEY DISEASE, WITHOUT LONG-TERM CURRENT USE OF INSULIN (HCC): ICD-10-CM

## 2024-06-26 DIAGNOSIS — E11.65 TYPE 2 DIABETES MELLITUS WITH HYPERGLYCEMIA, WITHOUT LONG-TERM CURRENT USE OF INSULIN (HCC): Primary | ICD-10-CM

## 2024-06-26 DIAGNOSIS — E11.22 TYPE 2 DIABETES MELLITUS WITH STAGE 3B CHRONIC KIDNEY DISEASE, WITHOUT LONG-TERM CURRENT USE OF INSULIN (HCC): ICD-10-CM

## 2024-06-26 DIAGNOSIS — I20.89 OTHER FORMS OF ANGINA PECTORIS (HCC): ICD-10-CM

## 2024-06-26 DIAGNOSIS — M11.20 PSEUDOGOUT: ICD-10-CM

## 2024-06-26 LAB — HBA1C MFR BLD: 9.3 %

## 2024-06-26 PROCEDURE — 73620 X-RAY EXAM OF FOOT: CPT

## 2024-06-26 RX ORDER — SEMAGLUTIDE 1.34 MG/ML
1 INJECTION, SOLUTION SUBCUTANEOUS
Qty: 3 ML | Refills: 0 | Status: SHIPPED | OUTPATIENT
Start: 2024-09-04 | End: 2024-10-02

## 2024-06-26 RX ORDER — SEMAGLUTIDE 0.68 MG/ML
0.5 INJECTION, SOLUTION SUBCUTANEOUS WEEKLY
Qty: 2 ML | Refills: 0 | Status: SHIPPED | OUTPATIENT
Start: 2024-07-31 | End: 2024-08-28

## 2024-06-26 RX ORDER — SEMAGLUTIDE 0.68 MG/ML
0.25 INJECTION, SOLUTION SUBCUTANEOUS WEEKLY
Qty: 3 ML | Refills: 2 | Status: SHIPPED | OUTPATIENT
Start: 2024-06-26 | End: 2024-07-24

## 2024-06-26 RX ORDER — DIAZEPAM 5 MG/1
5 TABLET ORAL EVERY 6 HOURS PRN
COMMUNITY

## 2024-06-26 SDOH — ECONOMIC STABILITY: HOUSING INSECURITY
IN THE LAST 12 MONTHS, WAS THERE A TIME WHEN YOU DID NOT HAVE A STEADY PLACE TO SLEEP OR SLEPT IN A SHELTER (INCLUDING NOW)?: NO

## 2024-06-26 SDOH — ECONOMIC STABILITY: FOOD INSECURITY: WITHIN THE PAST 12 MONTHS, THE FOOD YOU BOUGHT JUST DIDN'T LAST AND YOU DIDN'T HAVE MONEY TO GET MORE.: NEVER TRUE

## 2024-06-26 SDOH — ECONOMIC STABILITY: INCOME INSECURITY: HOW HARD IS IT FOR YOU TO PAY FOR THE VERY BASICS LIKE FOOD, HOUSING, MEDICAL CARE, AND HEATING?: NOT HARD AT ALL

## 2024-06-26 NOTE — PROGRESS NOTES
1. \"Have you been to the ER, urgent care clinic since your last visit?  Hospitalized since your last visit?\" No    2. \"Have you seen or consulted any other health care providers outside of the [facility] since your last visit?\" No     3. For patients aged 45-75: Has the patient had a colonoscopy / FIT/ Cologuard? Yes - Care Gap present. Most recent result on file

## 2024-06-26 NOTE — PROGRESS NOTES
HPI:  Lawson Adams is a 69 y.o. male who presents today with   Chief Complaint   Patient presents with    Diabetes     3 month follow-up       Patient is here to follow-up on diabetes.  Patient does report some dietary indiscretions.  He has eaten more fruit recently as he may typically do during the summer months.  His POC A1c today is 9.3%.  He has had his other blood work done and is here to review the same.      Patient complains of pain in his right foot in the area of the metatarsal bone.  He has had some injuries over the last few years.  There is tenderness to touch as well as pain with ambulation.      His triglyceride level has increased over his value.      Wt Readings from Last 3 Encounters:   06/26/24 89.4 kg (197 lb)   06/05/24 90.3 kg (199 lb)   03/20/24 89.7 kg (197 lb 12.8 oz)     Lab Results   Component Value Date     06/24/2024    K 4.6 06/24/2024     06/24/2024    CO2 26 06/24/2024    BUN 15 06/24/2024    CREATININE 0.94 06/24/2024    GLUCOSE 211 (H) 06/24/2024    CALCIUM 9.3 06/24/2024    BILITOT 0.6 06/24/2024    ALKPHOS 60 06/24/2024    AST 18 06/24/2024    ALT 25 06/24/2024    LABGLOM 89 06/24/2024    GFRAA >60 09/03/2022    AGRATIO 1.2 01/16/2023    GLOB 3.2 06/24/2024       Hemoglobin A1C   Date Value Ref Range Status   04/18/2024 9.0 (H) 4.2 - 5.6 % Final     Comment:     (NOTE)  HbA1C Interpretive Ranges  <5.7              Normal  5.7 - 6.4         Consider Prediabetes  >6.5              Consider Diabetes         Lab Results   Component Value Date     06/24/2024    K 4.6 06/24/2024     06/24/2024    CO2 26 06/24/2024    BUN 15 06/24/2024    CREATININE 0.94 06/24/2024    GLUCOSE 211 (H) 06/24/2024    CALCIUM 9.3 06/24/2024    BILITOT 0.6 06/24/2024    ALKPHOS 60 06/24/2024    AST 18 06/24/2024    ALT 25 06/24/2024    LABGLOM 89 06/24/2024    GFRAA >60 09/03/2022    AGRATIO 1.2 01/16/2023    GLOB 3.2 06/24/2024       Lab Results   Component Value Date    CHOL 118

## 2024-06-26 NOTE — PATIENT INSTRUCTIONS
Patient Education        Learning About the Mediterranean Diet  What is the Mediterranean diet?     The Mediterranean diet is a style of eating rather than a diet plan. It features foods eaten in [place], [place], southern [place] and [place], and other countries along the [place] Sea. It emphasizes eating foods like fish, fruits, vegetables, beans, high-fiber breads and whole grains, nuts, and olive oil. This style of eating includes limited red meat, cheese, and sweets.  Why choose the Mediterranean diet?  A Mediterranean-style diet may improve heart health. It contains more fat than other heart-healthy diets. But the fats are mainly from nuts, unsaturated oils (such as fish oils and olive oil), and certain nut or seed oils (such as canola, soybean, or flaxseed oil). These fats may help protect the heart and blood vessels.  How can you get started on the Mediterranean diet?  Here are some things you can do to switch to a more Mediterranean way of eating.  What to eat  Eat a variety of fruits and vegetables each day, such as grapes, blueberries, tomatoes, broccoli, peppers, figs, olives, spinach, eggplant, beans, lentils, and chickpeas.  Eat a variety of whole-grain foods each day, such as oats, brown rice, and whole wheat bread, pasta, and couscous.  Eat fish at least 2 times a week. Try tuna, salmon, mackerel, lake trout, herring, or sardines.  Eat moderate amounts of low-fat dairy products, such as milk, cheese, or yogurt.  Eat moderate amounts of poultry and eggs.  Choose healthy (unsaturated) fats, such as nuts, olive oil, and certain nut or seed oils like canola, soybean, and flaxseed.  Limit unhealthy (saturated) fats, such as butter, palm oil, and coconut oil. And limit fats found in animal products, such as meat and dairy products made with whole milk. Try to eat red meat only a few times a month in very small amounts.  Limit sweets and desserts to only a few times a week. This includes sugar-sweetened

## 2024-07-05 RX ORDER — SITAGLIPTIN 100 MG/1
TABLET, FILM COATED ORAL
Qty: 90 TABLET | Refills: 3 | Status: SHIPPED | OUTPATIENT
Start: 2024-07-05

## 2024-07-09 ENCOUNTER — TELEPHONE (OUTPATIENT)
Facility: CLINIC | Age: 69
End: 2024-07-09

## 2024-07-09 DIAGNOSIS — E11.65 TYPE 2 DIABETES MELLITUS WITH HYPERGLYCEMIA, WITHOUT LONG-TERM CURRENT USE OF INSULIN (HCC): Primary | ICD-10-CM

## 2024-07-10 NOTE — TELEPHONE ENCOUNTER
Patient called states that was advised by his insurance that the Ozempic is not covered, replacement rx for Trulicity  
Prior Authorization for Semaglutide,0.25 or 0.5MG/DOS, (OZEMPIC, 0.25 OR 0.5 MG/DOSE,) 2 MG/3ML SOPN [[id number]] started on 07/09/2024 via faxed form. Forms have been forward to the provider for review and signing. Once complete forms to the faxed to [employer] and confirmation sheet to be scanned into the patients chart and awaiting reply from patients insurance company via fax/e-mail. Allow 48-72 hours, thank you    
This request/message has been forwarded to the provider for review, thank you.    
[FreeTextEntry1] : FOLLOW UP IN 2 MONTHS , CHECK CMP

## 2024-07-11 ENCOUNTER — OFFICE VISIT (OUTPATIENT)
Facility: CLINIC | Age: 69
End: 2024-07-11
Payer: MEDICARE

## 2024-07-11 ENCOUNTER — TELEPHONE (OUTPATIENT)
Facility: CLINIC | Age: 69
End: 2024-07-11

## 2024-07-11 VITALS
BODY MASS INDEX: 26.82 KG/M2 | SYSTOLIC BLOOD PRESSURE: 112 MMHG | OXYGEN SATURATION: 98 % | HEART RATE: 75 BPM | DIASTOLIC BLOOD PRESSURE: 70 MMHG | TEMPERATURE: 96.6 F | WEIGHT: 198 LBS | RESPIRATION RATE: 18 BRPM | HEIGHT: 72 IN

## 2024-07-11 DIAGNOSIS — N18.32 TYPE 2 DIABETES MELLITUS WITH STAGE 3B CHRONIC KIDNEY DISEASE, WITHOUT LONG-TERM CURRENT USE OF INSULIN (HCC): Primary | ICD-10-CM

## 2024-07-11 DIAGNOSIS — E11.22 TYPE 2 DIABETES MELLITUS WITH STAGE 3B CHRONIC KIDNEY DISEASE, WITHOUT LONG-TERM CURRENT USE OF INSULIN (HCC): Primary | ICD-10-CM

## 2024-07-11 DIAGNOSIS — R42 DIZZINESS: ICD-10-CM

## 2024-07-11 PROCEDURE — 3017F COLORECTAL CA SCREEN DOC REV: CPT | Performed by: STUDENT IN AN ORGANIZED HEALTH CARE EDUCATION/TRAINING PROGRAM

## 2024-07-11 PROCEDURE — G8417 CALC BMI ABV UP PARAM F/U: HCPCS | Performed by: STUDENT IN AN ORGANIZED HEALTH CARE EDUCATION/TRAINING PROGRAM

## 2024-07-11 PROCEDURE — 99215 OFFICE O/P EST HI 40 MIN: CPT | Performed by: STUDENT IN AN ORGANIZED HEALTH CARE EDUCATION/TRAINING PROGRAM

## 2024-07-11 PROCEDURE — 1123F ACP DISCUSS/DSCN MKR DOCD: CPT | Performed by: STUDENT IN AN ORGANIZED HEALTH CARE EDUCATION/TRAINING PROGRAM

## 2024-07-11 PROCEDURE — 3052F HG A1C>EQUAL 8.0%<EQUAL 9.0%: CPT | Performed by: STUDENT IN AN ORGANIZED HEALTH CARE EDUCATION/TRAINING PROGRAM

## 2024-07-11 PROCEDURE — 1036F TOBACCO NON-USER: CPT | Performed by: STUDENT IN AN ORGANIZED HEALTH CARE EDUCATION/TRAINING PROGRAM

## 2024-07-11 PROCEDURE — 2022F DILAT RTA XM EVC RTNOPTHY: CPT | Performed by: STUDENT IN AN ORGANIZED HEALTH CARE EDUCATION/TRAINING PROGRAM

## 2024-07-11 PROCEDURE — G8427 DOCREV CUR MEDS BY ELIG CLIN: HCPCS | Performed by: STUDENT IN AN ORGANIZED HEALTH CARE EDUCATION/TRAINING PROGRAM

## 2024-07-11 ASSESSMENT — ENCOUNTER SYMPTOMS
EYE DISCHARGE: 0
EYE ITCHING: 0
VOICE CHANGE: 0
BACK PAIN: 0
TROUBLE SWALLOWING: 0
ABDOMINAL PAIN: 0
COUGH: 0
DIARRHEA: 0
WHEEZING: 0
SORE THROAT: 0
PHOTOPHOBIA: 0
VOMITING: 0
SHORTNESS OF BREATH: 0
NAUSEA: 0
CONSTIPATION: 0
RHINORRHEA: 0

## 2024-07-11 NOTE — PROGRESS NOTES
1. \"Have you been to the ER, urgent care clinic since your last visit?  Hospitalized since your last visit?\" No    2. \"Have you seen or consulted any other health care providers outside of the [facility] since your last visit?\" No     3. For patients aged 45-75: Has the patient had a colonoscopy / FIT/ Cologuard? Yes - Care Gap present. Most recent result on file        
restroom. He reported feeling disoriented briefly, and upon checking his blood pressure later, it was 124/64. The dizziness was not accompanied by a spinning sensation, but rather a sensation his vision darkening. He reported feeling dizzy upon looking downwards, a symptom he typically does not experience. His urine color varies from yellow to clear. He denies any hearing difficulties or tinnitus.    The patient has a history of diabetes, for which he monitors his blood glucose levels several times a week.  Ozempic was recently added to his regimen, but his insurance did not cover Ozempic. He was advised to consult his physician to prescribe Trulicity. His current medication regimen includes Januvia, Jardiance, and metformin.     HPI    Review of Systems   Constitutional:  Negative for activity change, appetite change, fatigue and fever.   HENT:  Negative for congestion, hearing loss, postnasal drip, rhinorrhea, sore throat, trouble swallowing and voice change.    Eyes:  Negative for photophobia, discharge, itching and visual disturbance.   Respiratory:  Negative for cough, shortness of breath and wheezing.    Cardiovascular:  Negative for chest pain, palpitations and leg swelling.   Gastrointestinal:  Negative for abdominal pain, constipation, diarrhea, nausea and vomiting.   Genitourinary:  Negative for difficulty urinating and dysuria.   Musculoskeletal:  Negative for arthralgias and back pain.   Skin:  Negative for rash.   Neurological:  Negative for dizziness, weakness, light-headedness and headaches.   Psychiatric/Behavioral:  Negative for sleep disturbance. The patient is not nervous/anxious.      /70 (Site: Left Upper Arm, Position: Standing, Cuff Size: Small Adult) Comment: manually  Pulse 75   Temp (!) 96.6 °F (35.9 °C) (Temporal)   Resp 18   Ht 1.829 m (6')   Wt 89.8 kg (198 lb)   SpO2 98%   BMI 26.85 kg/m²       Objective   Physical Exam  Vitals reviewed.   Constitutional:       General: He

## 2024-07-11 NOTE — TELEPHONE ENCOUNTER
Patient called to advise provider of name of medication that insurance would cover in place of Semaglutide, state that they will cover Trulicity or Mounjaro as a replacement, states prefers Mounjaro

## 2024-07-12 ENCOUNTER — CLINICAL DOCUMENTATION (OUTPATIENT)
Facility: CLINIC | Age: 69
End: 2024-07-12

## 2024-07-12 NOTE — PROGRESS NOTES
Approved  PA Detail   Prior authorization approved Case ID: [id number]      Payer: [employer]   CaseId:[id number];Status:Approved;Review Type:Prior Auth;Coverage Start Date:06/12/2024;Coverage End Date:12/31/2099;   Approval Details    Authorized from June 12, 2024 to December 31, 2099      Electronic appeal: Not supported   View History     Medication Being Authorized     Tirzepatide (MOUNJARO) 2.5 MG/0.5ML SOPN SC injection    Inject 0.5 mLs into the skin once a week    Dispense: 2 mL Refills: 0     Start: 7/11/2024 End: 8/10/2024     Class: Normal Diagnoses: Type 2 diabetes mellitus with stage 3b chronic kidney disease, without long-term current use of insulin (HCC)     This order has been released to its destination.   To be filled at: [facility] [address] [phone] - F [phone]

## 2024-07-14 RX ORDER — DULAGLUTIDE 0.75 MG/.5ML
0.75 INJECTION, SOLUTION SUBCUTANEOUS WEEKLY
Qty: 3 ADJUSTABLE DOSE PRE-FILLED PEN SYRINGE | Refills: 4 | Status: SHIPPED | OUTPATIENT
Start: 2024-07-14

## 2024-07-24 ENCOUNTER — PATIENT MESSAGE (OUTPATIENT)
Facility: CLINIC | Age: 69
End: 2024-07-24

## 2024-07-24 NOTE — TELEPHONE ENCOUNTER
From: Lawson Adams  To: Dr. Madalyn Meehan  Sent: 7/24/2024 7:42 AM EDT  Subject: trulicity     i have received the trulicity. do i need to discontinue the jardiance and continue with it also

## 2024-07-24 NOTE — TELEPHONE ENCOUNTER
The patient notified via [employer] the message was forwarded to the provider for review, thank you.

## 2024-07-30 ENCOUNTER — OFFICE VISIT (OUTPATIENT)
Facility: CLINIC | Age: 69
End: 2024-07-30

## 2024-07-30 VITALS
BODY MASS INDEX: 26.87 KG/M2 | SYSTOLIC BLOOD PRESSURE: 103 MMHG | WEIGHT: 198.4 LBS | HEIGHT: 72 IN | OXYGEN SATURATION: 97 % | TEMPERATURE: 97.1 F | DIASTOLIC BLOOD PRESSURE: 65 MMHG | HEART RATE: 59 BPM | RESPIRATION RATE: 16 BRPM

## 2024-07-30 DIAGNOSIS — S70.362A TICK BITE OF LEFT THIGH, INITIAL ENCOUNTER: Primary | ICD-10-CM
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
## 2024-07-30 NOTE — PROGRESS NOTES
HPI:  Lawson Adams is a 69 y.o. male who presents today with   Chief Complaint   Patient presents with    Tick Removal     Left Leg tick bite concerns           Patient states that he sustained a tick bite on his left upper thigh some days ago.  He noted a red flare around the bite initially but states that over the last day or so this flare has started to regress.  He denies any symptoms systemic symptoms of fever or malaise.            No data to display                        PMH,  Meds, Allergies, Family History, Social history reviewed      Current Outpatient Medications   Medication Sig Dispense Refill    dulaglutide (TRULICITY) 0.75 MG/0.5ML SOPN SC injection Inject 0.5 mLs into the skin once a week 3 Adjustable Dose Pre-filled Pen Syringe 4    Tirzepatide (MOUNJARO) 2.5 MG/0.5ML SOPN SC injection Inject 0.5 mLs into the skin once a week 2 mL 0    [START ON 8/11/2024] Tirzepatide (MOUNJARO) 5 MG/0.5ML SOPN SC injection Inject 0.5 mLs into the skin once a week 2 mL 0    [START ON 9/11/2024] Tirzepatide (MOUNJARO) 7.5 MG/0.5ML SOPN SC injection Inject 0.5 mLs into the skin once a week 2 mL 0    JANUVIA 100 MG tablet TAKE 1 TABLET DAILY 90 tablet 3    diazePAM (VALIUM) 5 MG tablet Take 1 tablet by mouth every 6 hours as needed for Anxiety.      nystatin (MYCOSTATIN) 750910 UNIT/GM powder Apply topically BID as needed 1 each 3    metFORMIN (GLUCOPHAGE) 500 MG tablet TAKE 2 TABLETS IN THE MORNING AND 2 TABLETS IN THE EVENING 360 tablet 3    blood glucose test strips (FREESTYLE LITE) strip Blood sugar check daily. 100 each 3    lisinopril (PRINIVIL;ZESTRIL) 5 MG tablet TAKE 1 TABLET DAILY 90 tablet 3    JARDIANCE 25 MG tablet TAKE 1 TABLET DAILY 90 tablet 3    metoprolol tartrate (LOPRESSOR) 25 MG tablet Take 0.5 tablets by mouth 2 times daily 90 tablet 3    rosuvastatin (CRESTOR) 20 MG tablet TAKE 1 TABLET NIGHTLY 90 tablet 3    FreeStyle Lancets MISC USE TO CHECK BLOOD SUGAR DAILY      aspirin 81 MG chewable

## 2024-07-30 NOTE — PROGRESS NOTES
\"Have you been to the ER, urgent care clinic since your last visit?  Hospitalized since your last visit?\"    NO    “Have you seen or consulted any other health care providers outside of [facility] since your last visit?”    NO      Click Here for Release of Records Request

## 2024-08-22 RX ORDER — ROSUVASTATIN CALCIUM 20 MG/1
TABLET, COATED ORAL
Qty: 90 TABLET | Refills: 3 | Status: SHIPPED | OUTPATIENT
Start: 2024-08-22

## 2024-08-23 RX ORDER — EMPAGLIFLOZIN 25 MG/1
TABLET, FILM COATED ORAL
Qty: 90 TABLET | Refills: 3 | Status: SHIPPED | OUTPATIENT
Start: 2024-08-23

## 2024-09-19 RX ORDER — LANCETS 28 GAUGE
EACH MISCELLANEOUS
Qty: 100 EACH | Refills: 3 | Status: SHIPPED | OUTPATIENT
Start: 2024-09-19

## 2024-09-27 ENCOUNTER — HOSPITAL ENCOUNTER (OUTPATIENT)
Facility: HOSPITAL | Age: 69
Setting detail: SPECIMEN
Discharge: HOME | End: 2024-09-30
Payer: MEDICARE

## 2024-09-27 DIAGNOSIS — M11.20 PSEUDOGOUT: ICD-10-CM

## 2024-09-27 DIAGNOSIS — E11.22 TYPE 2 DIABETES MELLITUS WITH STAGE 3B CHRONIC KIDNEY DISEASE, WITHOUT LONG-TERM CURRENT USE OF INSULIN (HCC): ICD-10-CM

## 2024-09-27 DIAGNOSIS — S70.362A TICK BITE OF LEFT THIGH, INITIAL ENCOUNTER: ICD-10-CM

## 2024-09-27 DIAGNOSIS — M79.671 RIGHT FOOT PAIN: ICD-10-CM
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
## 2024-09-27 DIAGNOSIS — N18.32 TYPE 2 DIABETES MELLITUS WITH STAGE 3B CHRONIC KIDNEY DISEASE, WITHOUT LONG-TERM CURRENT USE OF INSULIN (HCC): ICD-10-CM

## 2024-09-27 LAB
25(OH)D3 SERPL-MCNC: 28.4 NG/ML (ref 30–100)
ALBUMIN SERPL-MCNC: 4.1 G/DL (ref 3.4–5)
ALBUMIN/GLOB SERPL: 1.4 (ref 0.8–1.7)
ALP SERPL-CCNC: 53 U/L (ref 45–117)
ALT SERPL-CCNC: 17 U/L (ref 16–61)
ANION GAP SERPL CALC-SCNC: 4 MMOL/L (ref 3–18)
AST SERPL-CCNC: 11 U/L (ref 10–38)
BILIRUB SERPL-MCNC: 0.6 MG/DL (ref 0.2–1)
BUN SERPL-MCNC: 21 MG/DL (ref 7–18)
BUN/CREAT SERPL: 22 (ref 12–20)
CALCIUM SERPL-MCNC: 9.1 MG/DL (ref 8.5–10.1)
CHLORIDE SERPL-SCNC: 107 MMOL/L (ref 100–111)
CHOLEST SERPL-MCNC: 83 MG/DL
CO2 SERPL-SCNC: 26 MMOL/L (ref 21–32)
CREAT SERPL-MCNC: 0.94 MG/DL (ref 0.6–1.3)
EST. AVERAGE GLUCOSE BLD GHB EST-MCNC: 157 MG/DL
GLOBULIN SER CALC-MCNC: 2.9 G/DL (ref 2–4)
GLUCOSE SERPL-MCNC: 129 MG/DL (ref 74–99)
HBA1C MFR BLD: 7.1 % (ref 4.2–5.6)
HDLC SERPL-MCNC: 39 MG/DL (ref 40–60)
HDLC SERPL: 2.1 (ref 0–5)
LDLC SERPL CALC-MCNC: 13 MG/DL (ref 0–100)
LIPID PANEL: ABNORMAL
POTASSIUM SERPL-SCNC: 4.4 MMOL/L (ref 3.5–5.5)
PROT SERPL-MCNC: 7 G/DL (ref 6.4–8.2)
SODIUM SERPL-SCNC: 137 MMOL/L (ref 136–145)
TRIGL SERPL-MCNC: 155 MG/DL
TSH SERPL DL<=0.05 MIU/L-ACNC: 1 UIU/ML (ref 0.36–3.74)
VLDLC SERPL CALC-MCNC: 31 MG/DL

## 2024-09-27 PROCEDURE — 80053 COMPREHEN METABOLIC PANEL: CPT

## 2024-09-27 PROCEDURE — 87476 LYME DIS DNA AMP PROBE: CPT

## 2024-09-27 PROCEDURE — 80061 LIPID PANEL: CPT

## 2024-09-27 PROCEDURE — 36415 COLL VENOUS BLD VENIPUNCTURE: CPT

## 2024-09-27 PROCEDURE — 83036 HEMOGLOBIN GLYCOSYLATED A1C: CPT

## 2024-09-27 PROCEDURE — 82306 VITAMIN D 25 HYDROXY: CPT

## 2024-09-27 PROCEDURE — 84443 ASSAY THYROID STIM HORMONE: CPT

## 2024-10-01 LAB
B BURGDOR DNA SPEC QL NAA+PROBE: NEGATIVE
SPECIMEN SOURCE: NORMAL

## 2024-10-22 ENCOUNTER — OFFICE VISIT (OUTPATIENT)
Facility: CLINIC | Age: 69
End: 2024-10-22
Payer: MEDICARE

## 2024-10-22 VITALS
SYSTOLIC BLOOD PRESSURE: 99 MMHG | BODY MASS INDEX: 26.36 KG/M2 | TEMPERATURE: 97.5 F | HEART RATE: 74 BPM | RESPIRATION RATE: 18 BRPM | DIASTOLIC BLOOD PRESSURE: 68 MMHG | HEIGHT: 72 IN | WEIGHT: 194.6 LBS | OXYGEN SATURATION: 96 %

## 2024-10-22 DIAGNOSIS — E11.65 TYPE 2 DIABETES MELLITUS WITH HYPERGLYCEMIA, WITHOUT LONG-TERM CURRENT USE OF INSULIN (HCC): ICD-10-CM

## 2024-10-22 DIAGNOSIS — Z87.891 PERSONAL HISTORY OF TOBACCO USE: ICD-10-CM

## 2024-10-22 DIAGNOSIS — M62.838 MUSCLE SPASM: ICD-10-CM

## 2024-10-22 DIAGNOSIS — Z00.00 MEDICARE ANNUAL WELLNESS VISIT, SUBSEQUENT: Primary | ICD-10-CM

## 2024-10-22 PROCEDURE — 1125F AMNT PAIN NOTED PAIN PRSNT: CPT | Performed by: FAMILY MEDICINE

## 2024-10-22 PROCEDURE — G0439 PPPS, SUBSEQ VISIT: HCPCS | Performed by: FAMILY MEDICINE

## 2024-10-22 PROCEDURE — 1123F ACP DISCUSS/DSCN MKR DOCD: CPT | Performed by: FAMILY MEDICINE

## 2024-10-22 PROCEDURE — G0296 VISIT TO DETERM LDCT ELIG: HCPCS | Performed by: FAMILY MEDICINE

## 2024-10-22 PROCEDURE — 1160F RVW MEDS BY RX/DR IN RCRD: CPT | Performed by: FAMILY MEDICINE

## 2024-10-22 PROCEDURE — 3017F COLORECTAL CA SCREEN DOC REV: CPT | Performed by: FAMILY MEDICINE

## 2024-10-22 PROCEDURE — 1159F MED LIST DOCD IN RCRD: CPT | Performed by: FAMILY MEDICINE

## 2024-10-22 PROCEDURE — G8484 FLU IMMUNIZE NO ADMIN: HCPCS | Performed by: FAMILY MEDICINE

## 2024-10-22 PROCEDURE — 3051F HG A1C>EQUAL 7.0%<8.0%: CPT | Performed by: FAMILY MEDICINE

## 2024-10-22 RX ORDER — DIAZEPAM 5 MG/1
5 TABLET ORAL EVERY 12 HOURS PRN
Qty: 30 TABLET | Refills: 1 | Status: SHIPPED | OUTPATIENT
Start: 2024-10-22 | End: 2024-11-21

## 2024-10-22 SDOH — ECONOMIC STABILITY: FOOD INSECURITY: WITHIN THE PAST 12 MONTHS, YOU WORRIED THAT YOUR FOOD WOULD RUN OUT BEFORE YOU GOT MONEY TO BUY MORE.: NEVER TRUE

## 2024-10-22 SDOH — ECONOMIC STABILITY: FOOD INSECURITY: WITHIN THE PAST 12 MONTHS, THE FOOD YOU BOUGHT JUST DIDN'T LAST AND YOU DIDN'T HAVE MONEY TO GET MORE.: NEVER TRUE

## 2024-10-22 SDOH — ECONOMIC STABILITY: INCOME INSECURITY: HOW HARD IS IT FOR YOU TO PAY FOR THE VERY BASICS LIKE FOOD, HOUSING, MEDICAL CARE, AND HEATING?: NOT HARD AT ALL

## 2024-10-22 ASSESSMENT — ANXIETY QUESTIONNAIRES
IF YOU CHECKED OFF ANY PROBLEMS ON THIS QUESTIONNAIRE, HOW DIFFICULT HAVE THESE PROBLEMS MADE IT FOR YOU TO DO YOUR WORK, TAKE CARE OF THINGS AT HOME, OR GET ALONG WITH OTHER PEOPLE: NOT DIFFICULT AT ALL
4. TROUBLE RELAXING: NOT AT ALL
GAD7 TOTAL SCORE: 0
6. BECOMING EASILY ANNOYED OR IRRITABLE: NOT AT ALL
1. FEELING NERVOUS, ANXIOUS, OR ON EDGE: NOT AT ALL
5. BEING SO RESTLESS THAT IT IS HARD TO SIT STILL: NOT AT ALL
3. WORRYING TOO MUCH ABOUT DIFFERENT THINGS: NOT AT ALL
7. FEELING AFRAID AS IF SOMETHING AWFUL MIGHT HAPPEN: NOT AT ALL
2. NOT BEING ABLE TO STOP OR CONTROL WORRYING: NOT AT ALL

## 2024-10-22 ASSESSMENT — PATIENT HEALTH QUESTIONNAIRE - PHQ9
SUM OF ALL RESPONSES TO PHQ QUESTIONS 1-9: 0
SUM OF ALL RESPONSES TO PHQ QUESTIONS 1-9: 0
1. LITTLE INTEREST OR PLEASURE IN DOING THINGS: NOT AT ALL
2. FEELING DOWN, DEPRESSED OR HOPELESS: NOT AT ALL
SUM OF ALL RESPONSES TO PHQ QUESTIONS 1-9: 0
SUM OF ALL RESPONSES TO PHQ9 QUESTIONS 1 & 2: 0
SUM OF ALL RESPONSES TO PHQ QUESTIONS 1-9: 0

## 2024-10-22 ASSESSMENT — LIFESTYLE VARIABLES
HOW MANY STANDARD DRINKS CONTAINING ALCOHOL DO YOU HAVE ON A TYPICAL DAY: 3 OR 4
HOW OFTEN DO YOU HAVE A DRINK CONTAINING ALCOHOL: 2-4 TIMES A MONTH

## 2024-10-22 NOTE — PROGRESS NOTES
Medicare Annual Wellness Visit    Lawson Adams is here for Diabetes (3 month follow-up ) and Medicare AWV    Assessment & Plan      Medicare annual wellness visit, subsequent  Type 2 diabetes mellitus with hyperglycemia, without long-term current use of insulin (HCC)  -     dulaglutide (TRULICITY) 1.5 MG/0.5ML SC injection; Inject 0.5 mLs into the skin once a week, Disp-12 Adjustable Dose Pre-filled Pen Syringe, R-1Normal  -     Comprehensive Metabolic Panel; Future  -     Hemoglobin A1C; Future  -     Lipid Panel; Future  -     TSH; Future  Muscle spasm  -     diazePAM (VALIUM) 5 MG tablet; Take 1 tablet by mouth every 12 hours as needed for Anxiety for up to 30 days. Max Daily Amount: 10 mg, Disp-30 tablet, R-1Normal  Personal history of tobacco use  -     IN VISIT TO DISCUSS LUNG CA SCREEN W LDCT      As above  Patient is clinically stable  A1c has improved  We will increase patient's Trulicity to 1.5 mg weekly  Patient is to discontinue Januvia and continue Jardiance.  This has been clarified.  Refilled Valium as per orders  Labs as ordered for patient's next visit      Recommendations for Preventive Services Due: see orders and patient instructions/AVS.  Recommended screening schedule for the next 5-10 years is provided to the patient in written form: see Patient Instructions/AVS.     Return in about 4 months (around 2/22/2025) for diabetes.     Subjective   History of Present Illness    Patient has been doing well  Has no new complaints  Needs a refill on Valium which he uses for his muscle spasms due to his arthritis of the back  He has had blood work done and is here to review the same      Last labs are as noted below    Lab Results   Component Value Date    CHOL 83 09/27/2024    TRIG 155 (H) 09/27/2024    HDL 39 (L) 09/27/2024    LDL 13 09/27/2024    VLDL 31 09/27/2024    CHOLHDLRATIO 2.1 09/27/2024     Lab Results   Component Value Date     09/27/2024    K 4.4 09/27/2024     09/27/2024    CO2

## 2024-10-22 NOTE — PROGRESS NOTES
\"Have you been to the ER, urgent care clinic since your last visit?  Hospitalized since your last visit?\"    NO    “Have you seen or consulted any other health care providers outside of [facility] since your last visit?”    This include any pap smears or colon screening.  Yes, Ophthalmology- Dr. Cotto and Dental: Dr. Carty    Click Here for Release of Records Request

## 2024-11-23 RX ORDER — LISINOPRIL 5 MG/1
TABLET ORAL
Qty: 90 TABLET | Refills: 3 | Status: SHIPPED | OUTPATIENT
Start: 2024-11-23

## 2025-01-08 ENCOUNTER — HOSPITAL ENCOUNTER (OUTPATIENT)
Facility: HOSPITAL | Age: 70
Setting detail: SPECIMEN
Discharge: HOME | End: 2025-01-11
Payer: MEDICARE

## 2025-01-08 DIAGNOSIS — E11.65 TYPE 2 DIABETES MELLITUS WITH HYPERGLYCEMIA, WITHOUT LONG-TERM CURRENT USE OF INSULIN (HCC): ICD-10-CM

## 2025-01-08 LAB
ALBUMIN SERPL-MCNC: 3.9 G/DL (ref 3.4–5)
ALBUMIN/GLOB SERPL: 1.2 (ref 0.8–1.7)
ALP SERPL-CCNC: 62 U/L (ref 45–117)
ALT SERPL-CCNC: 23 U/L (ref 16–61)
ANION GAP SERPL CALC-SCNC: 6 MMOL/L (ref 3–18)
AST SERPL-CCNC: 13 U/L (ref 10–38)
BILIRUB SERPL-MCNC: 0.7 MG/DL (ref 0.2–1)
BUN SERPL-MCNC: 14 MG/DL (ref 7–18)
BUN/CREAT SERPL: 15 (ref 12–20)
CALCIUM SERPL-MCNC: 9.2 MG/DL (ref 8.5–10.1)
CHLORIDE SERPL-SCNC: 105 MMOL/L (ref 100–111)
CHOLEST SERPL-MCNC: 107 MG/DL
CO2 SERPL-SCNC: 26 MMOL/L (ref 21–32)
CREAT SERPL-MCNC: 0.95 MG/DL (ref 0.6–1.3)
EST. AVERAGE GLUCOSE BLD GHB EST-MCNC: 200 MG/DL
GLOBULIN SER CALC-MCNC: 3.2 G/DL (ref 2–4)
GLUCOSE SERPL-MCNC: 213 MG/DL (ref 74–99)
HBA1C MFR BLD: 8.6 % (ref 4.2–5.6)
HDLC SERPL-MCNC: 47 MG/DL (ref 40–60)
HDLC SERPL: 2.3 (ref 0–5)
LDLC SERPL CALC-MCNC: 32 MG/DL (ref 0–100)
LIPID PANEL: NORMAL
POTASSIUM SERPL-SCNC: 4.8 MMOL/L (ref 3.5–5.5)
PROT SERPL-MCNC: 7.1 G/DL (ref 6.4–8.2)
SODIUM SERPL-SCNC: 137 MMOL/L (ref 136–145)
TRIGL SERPL-MCNC: 140 MG/DL
TSH SERPL DL<=0.05 MIU/L-ACNC: 1.56 UIU/ML (ref 0.36–3.74)
VLDLC SERPL CALC-MCNC: 28 MG/DL

## 2025-01-08 PROCEDURE — 80061 LIPID PANEL: CPT

## 2025-01-08 PROCEDURE — 84443 ASSAY THYROID STIM HORMONE: CPT

## 2025-01-08 PROCEDURE — 80053 COMPREHEN METABOLIC PANEL: CPT

## 2025-01-08 PROCEDURE — 36415 COLL VENOUS BLD VENIPUNCTURE: CPT

## 2025-01-08 PROCEDURE — 83036 HEMOGLOBIN GLYCOSYLATED A1C: CPT

## 2025-02-01 RX ORDER — BLOOD-GLUCOSE METER
KIT MISCELLANEOUS
Qty: 100 STRIP | Refills: 3 | Status: SHIPPED | OUTPATIENT
Start: 2025-02-01

## 2025-02-05 ENCOUNTER — OFFICE VISIT (OUTPATIENT)
Facility: CLINIC | Age: 70
End: 2025-02-05

## 2025-02-05 VITALS
TEMPERATURE: 97.2 F | DIASTOLIC BLOOD PRESSURE: 64 MMHG | SYSTOLIC BLOOD PRESSURE: 130 MMHG | WEIGHT: 201.6 LBS | OXYGEN SATURATION: 95 % | BODY MASS INDEX: 27.3 KG/M2 | HEART RATE: 87 BPM | HEIGHT: 72 IN | RESPIRATION RATE: 18 BRPM

## 2025-02-05 DIAGNOSIS — G89.29 CHRONIC PAIN OF BOTH KNEES: ICD-10-CM

## 2025-02-05 DIAGNOSIS — M25.561 CHRONIC PAIN OF BOTH KNEES: ICD-10-CM

## 2025-02-05 DIAGNOSIS — C64.2 MALIGNANT NEOPLASM OF LEFT KIDNEY, EXCEPT RENAL PELVIS (HCC): ICD-10-CM

## 2025-02-05 DIAGNOSIS — M25.562 CHRONIC PAIN OF BOTH KNEES: ICD-10-CM

## 2025-02-05 DIAGNOSIS — E11.22 TYPE 2 DIABETES MELLITUS WITH STAGE 3B CHRONIC KIDNEY DISEASE, WITHOUT LONG-TERM CURRENT USE OF INSULIN (HCC): ICD-10-CM

## 2025-02-05 DIAGNOSIS — E11.65 TYPE 2 DIABETES MELLITUS WITH HYPERGLYCEMIA, WITHOUT LONG-TERM CURRENT USE OF INSULIN (HCC): Primary | ICD-10-CM

## 2025-02-05 DIAGNOSIS — N18.32 TYPE 2 DIABETES MELLITUS WITH STAGE 3B CHRONIC KIDNEY DISEASE, WITHOUT LONG-TERM CURRENT USE OF INSULIN (HCC): ICD-10-CM

## 2025-02-05 SDOH — ECONOMIC STABILITY: FOOD INSECURITY: WITHIN THE PAST 12 MONTHS, THE FOOD YOU BOUGHT JUST DIDN'T LAST AND YOU DIDN'T HAVE MONEY TO GET MORE.: NEVER TRUE

## 2025-02-05 SDOH — ECONOMIC STABILITY: INCOME INSECURITY: IN THE LAST 12 MONTHS, WAS THERE A TIME WHEN YOU WERE NOT ABLE TO PAY THE MORTGAGE OR RENT ON TIME?: NO

## 2025-02-05 SDOH — ECONOMIC STABILITY: TRANSPORTATION INSECURITY
IN THE PAST 12 MONTHS, HAS LACK OF TRANSPORTATION KEPT YOU FROM MEETINGS, WORK, OR FROM GETTING THINGS NEEDED FOR DAILY LIVING?: NO

## 2025-02-05 SDOH — ECONOMIC STABILITY: FOOD INSECURITY: WITHIN THE PAST 12 MONTHS, YOU WORRIED THAT YOUR FOOD WOULD RUN OUT BEFORE YOU GOT MONEY TO BUY MORE.: NEVER TRUE

## 2025-02-05 SDOH — ECONOMIC STABILITY: TRANSPORTATION INSECURITY
IN THE PAST 12 MONTHS, HAS THE LACK OF TRANSPORTATION KEPT YOU FROM MEDICAL APPOINTMENTS OR FROM GETTING MEDICATIONS?: NO

## 2025-02-05 ASSESSMENT — PATIENT HEALTH QUESTIONNAIRE - PHQ9
2. FEELING DOWN, DEPRESSED OR HOPELESS: NOT AT ALL
SUM OF ALL RESPONSES TO PHQ QUESTIONS 1-9: 0
SUM OF ALL RESPONSES TO PHQ QUESTIONS 1-9: 0
SUM OF ALL RESPONSES TO PHQ9 QUESTIONS 1 & 2: 0
SUM OF ALL RESPONSES TO PHQ QUESTIONS 1-9: 0
SUM OF ALL RESPONSES TO PHQ QUESTIONS 1-9: 0
1. LITTLE INTEREST OR PLEASURE IN DOING THINGS: NOT AT ALL

## 2025-02-05 NOTE — PROGRESS NOTES
HPI:  Lawson Adams is a 70 y.o. male who presents today with   Chief Complaint   Patient presents with    Diabetes     4 month follow up         History of Present Illness  The patient presents for evaluation of diabetes    He also discusses a cough, knee and  ankle pain, and a skin lesion     His A1c has increased 7.9 to 8.6 on 01/08/2025. He has been unable to maintain his blood glucose levels below 200. He attributes this to his stopping Jardiance, which he believes has led to a significant increase in his blood glucose levels.  Patient mistakenly stopped Jardiance  and Januvia when he started Trulicity.  Patient advised that he was to stop Januvia: Upon starting Jardiance.  Despite an increase in his Trulicity dosage to 1.5, he has not observed any significant changes in his blood glucose levels.  This is likely due to his not taking Jardiance.  He recalls an instance where his blood glucose level was 270, even after fasting for a day and a half. He also mentions that he has run out of his Januvia medication. He has been receiving emails regarding the availability of Mounjaro and Ozempic. He is currently on Trulicity 0.75, taking two doses at a time to get the 1.5 dose, and has enough supply to last for the next week and a half. He has used up all his Trulicity 1.5 pens and has a refill available.     He has been experiencing a cough for the past month, accompanied by expectoration. He reports no feverish symptoms. He has undergone testing for COVID-19 and influenza, both of which returned negative results. He also reports a loss of smell and taste, which he attributes to nasal congestion.    He reports difficulty in walking due to pain in his knees and ankles, which has also hindered his ability to exercise. He describes his gait as shuffling and reports an inability to lift his knee. He also reports pain in his ankles. He has not had any recent x-rays of his knees. He has undergone blood work for arthritis,

## 2025-02-11 ENCOUNTER — HOSPITAL ENCOUNTER (OUTPATIENT)
Facility: HOSPITAL | Age: 70
Setting detail: RECURRING SERIES
Discharge: HOME | End: 2025-02-14
Payer: MEDICARE

## 2025-02-11 PROCEDURE — 97110 THERAPEUTIC EXERCISES: CPT

## 2025-02-11 PROCEDURE — 97162 PT EVAL MOD COMPLEX 30 MIN: CPT

## 2025-02-11 PROCEDURE — 97535 SELF CARE MNGMENT TRAINING: CPT

## 2025-02-11 NOTE — THERAPY EVALUATION
[facility]  [address], [address], [place] [address]  Ph:[phone] Fx:[phone]  Plan of Care / Statement of Necessity for Physical Therapy Services     Patient Name: Lawson Adams : 1955   Medical   Diagnosis: Left knee pain [M25.562]  Right knee pain [M25.561]  Treatment Diagnosis:  M25.561  RIGHT KNEE PAIN and M25.562  LEFT KNEE PAIN    Onset Date: > 5 years, MD order 25     Referral Source: Madalyn Meehan MD Start of Care (SOC): 2025   Prior Hospitalization: See medical history Provider #: [id number]   Prior Level of Function: Hx of B knee pain - chronic , no AD, I all areas of ADLS and activities, enjoys hunting, . Tolerates household and community activities as able    Comorbidities:  Musculoskeletal disorders and Other:   pseudo gout, arthritis, kidney cancer , DM, Heart disease/high cholesterol, neuropathy B feet, 2 implosions on the R foot with plantar fasciitis.      Assessment / key information:   71 YO male diagnosed as above and with S/S consistent with above diagnosis presents to skilled outpatient PT [facility] B knee pain, stiffness and tightness and locking, difficulty standing after prolonged sitting, decreased ease with sit to stand transfers/car transfers. Notes long time baseball catching has probably contributed to what he is calling arthritis although no x-rays have been performed, reports concerns with balance as he has neuropathy. Also notes intermittent shooting pains from the B inguinal region/hip flexors and pain radiates down the anterior thigh. \"It's mostly with getting up and down or having to stand for long periods.\"  Reports R knee is worse than the L. He has B knee pain, LOM, decrease LE strength and flexibility, decrease ease with stair negotiation and chair transfers, and decrease tolerance to ADLS and activities.   Patient demonstrates the potential to make gains with improved ROM, strength, 
skilled Physical Therapy so as to monitor their response to and modify their treatment plan accordingly. Patient appears to be an appropriate candidate for skilled outpatient Physical Therapy.      Patient will continue to benefit from skilled PT services to modify and progress therapeutic interventions, analyze and address functional mobility deficits, analyze and address ROM deficits, analyze and address strength deficits, analyze and address soft tissue restrictions, analyze and cue for proper movement patterns, analyze and modify for postural abnormalities, and instruct in home and community integration to address functional deficits and attain remaining goals.       [x]  See Plan of Care for goals and reassessment       PLAN  - Continue Plan of Care  - Upgrade activities as tolerated    Angelic Mcmanus, PT 2/11/2025  2:39 PM  If an interpreting service was utilized for treatment of this patient, the contents of this document represent the material reviewed with the patient via the .

## 2025-02-13 ENCOUNTER — TELEPHONE (OUTPATIENT)
Facility: HOSPITAL | Age: 70
End: 2025-02-13

## 2025-02-13 NOTE — TELEPHONE ENCOUNTER
Patient cancelled appt. on 2/13/25 at 10:56am due to the patient going out of town & he is not able to make his appt. tomorrow.

## 2025-02-14 ENCOUNTER — APPOINTMENT (OUTPATIENT)
Facility: HOSPITAL | Age: 70
End: 2025-02-14
Payer: MEDICARE

## 2025-02-18 ENCOUNTER — HOSPITAL ENCOUNTER (OUTPATIENT)
Facility: HOSPITAL | Age: 70
Setting detail: RECURRING SERIES
Discharge: HOME | End: 2025-02-21
Payer: MEDICARE

## 2025-02-18 PROCEDURE — 97140 MANUAL THERAPY 1/> REGIONS: CPT

## 2025-02-18 PROCEDURE — 97530 THERAPEUTIC ACTIVITIES: CPT

## 2025-02-18 PROCEDURE — 97110 THERAPEUTIC EXERCISES: CPT

## 2025-02-18 NOTE — PROGRESS NOTES
address imbalance/dizziness, and instruct in home and community integration to address functional deficits and attain remaining goals.    Progress toward goals / Updated goals:  []  See Progress Note/Recertification  1 patient will have established and be I with HEP to aid with independence and self management at discharge  EVAL HEP issued at evaluation  2 patient will have pain 3/10 to aid with increase tolerance to ADLS and regular daily activities at home and in the community  EVAL 5  Current:  arrival  pain .5 2/18/25  3 patient will have LEFS 43 to show minimal projected gains in increased tolerance to ADLS and activities  EVAL 34     Long Term Goals: To be accomplished in 8 WEEKS  1 patient will have established and be I with HEP to aid with independence and self management at discharge  EVAL HEP issued at evaluation  2 patient will have pain 2/10 to aid with increase tolerance to ADLS and regular daily activities at home and in the community  EVAL 5  3 patient will have LEFS 53 to show significant gains in increased tolerance to ADLS and activities  EVAL 34  4 patient will have overall increased ease with sit to stand 5x with UE  <28 seconds  EVAL 37 seconds         Next PN/ RC due 3/11/25  Auth due (visit number/ date) CHENG   ex  12/31/25    PLAN  - Continue Plan of Care    Yas Miles, PTA    2/18/2025    8:54 AM  If an interpreting service was utilized for treatment of this patient, the contents of this document represent the material reviewed with the patient via the .     Future Appointments   Date Time Provider Department Center   2/20/2025  8:10 AM Angelic Mcmanus, PT [facility]   5/1/2025  8:30 AM [facility] LAB HR [facility] DEP   5/8/2025 11:00 AM Madalyn Meehan MD HR [facility] DEP   6/6/2025  8:00 AM Darrel Barcenas MD [facility] BS AMB

## 2025-02-20 ENCOUNTER — APPOINTMENT (OUTPATIENT)
Facility: HOSPITAL | Age: 70
End: 2025-02-20
Payer: MEDICARE

## 2025-02-26 ENCOUNTER — APPOINTMENT (OUTPATIENT)
Facility: HOSPITAL | Age: 70
End: 2025-02-26
Payer: MEDICARE

## 2025-02-26 ENCOUNTER — TELEPHONE (OUTPATIENT)
Facility: HOSPITAL | Age: 70
End: 2025-02-26

## 2025-02-26 NOTE — TELEPHONE ENCOUNTER
Staff cancelled appt. on 2/26/25 at 8:27am due to the therapist being out of the office today due to not feeling well.

## 2025-02-28 ENCOUNTER — HOSPITAL ENCOUNTER (OUTPATIENT)
Facility: HOSPITAL | Age: 70
Setting detail: RECURRING SERIES
End: 2025-02-28
Payer: MEDICARE

## 2025-02-28 PROCEDURE — 97530 THERAPEUTIC ACTIVITIES: CPT

## 2025-02-28 PROCEDURE — 97110 THERAPEUTIC EXERCISES: CPT

## 2025-02-28 NOTE — PROGRESS NOTES
PHYSICAL / OCCUPATIONAL THERAPY - DAILY TREATMENT NOTE    Patient Name: Lawson Adams    Date: 2025    : 1955  Insurance: Payor: MEDICARE / Plan: MEDICARE PART A AND B / Product Type: *No Product type* /      Patient  verified Yes     Visit #   Current / Total 3 16   Time   In / Out [id number]   Pain   In / Out 0 0   Subjective Functional Status/Changes: Patient came 6 min late for her Patient complains of no pain in both knees but pt reports Stiffness in B knee today     TREATMENT AREA =  Left knee pain [M25.562]  Right knee pain [M25.561]     OBJECTIVE        Therapeutic Procedures:    Tx Min Billable or 1:1 Min (if diff from Tx Min) Procedure, Rationale, Specifics   24  08357 Therapeutic Exercise (timed):  increase ROM, strength, coordination, balance, and proprioception to improve patient's ability to progress to PLOF and address remaining functional goals. (see flow sheet as applicable)     Details if applicable:       10  39056 Therapeutic Activity (timed):  use of dynamic activities replicating functional movements to increase ROM, strength, coordination, balance, and proprioception in order to improve patient's ability to progress to PLOF and address remaining functional goals.  (see flow sheet as applicable)     Details if applicable:            Details if applicable:            Details if applicable:            Details if applicable:     34  [facility] Totals Reminder: bill using total billable min of TIMED therapeutic procedures (example: do not include dry needle or estim unattended, both untimed codes, in totals to left)  8-22 min = 1 unit; 23-37 min = 2 units; 38-52 min = 3 units; 53-67 min = 4 units; 68-82 min = 5 units   Total Total     [x]  Patient Education billed concurrently with other procedures   [x] Review HEP    [] Progressed/Changed HEP, detail:    [] Other detail:       Objective Information/Functional Measures/Assessment  Pt reports that he is doing his  HEP  2-3 times/week. Pt is

## 2025-05-05 ENCOUNTER — HOSPITAL ENCOUNTER (OUTPATIENT)
Facility: HOSPITAL | Age: 70
Discharge: HOME | End: 2025-05-08
Payer: MEDICARE

## 2025-05-05 DIAGNOSIS — E11.65 TYPE 2 DIABETES MELLITUS WITH HYPERGLYCEMIA, WITHOUT LONG-TERM CURRENT USE OF INSULIN (HCC): ICD-10-CM

## 2025-05-05 DIAGNOSIS — E11.22 TYPE 2 DIABETES MELLITUS WITH STAGE 3B CHRONIC KIDNEY DISEASE, WITHOUT LONG-TERM CURRENT USE OF INSULIN (HCC): ICD-10-CM

## 2025-05-05 DIAGNOSIS — N18.32 TYPE 2 DIABETES MELLITUS WITH STAGE 3B CHRONIC KIDNEY DISEASE, WITHOUT LONG-TERM CURRENT USE OF INSULIN (HCC): ICD-10-CM

## 2025-05-05 LAB
25(OH)D3 SERPL-MCNC: 19.9 NG/ML (ref 30–100)
ALBUMIN SERPL-MCNC: 3.5 G/DL (ref 3.4–5)
ALBUMIN/GLOB SERPL: 1.3 (ref 0.8–1.7)
ALP SERPL-CCNC: 60 U/L (ref 45–117)
ALT SERPL-CCNC: 18 U/L (ref 10–50)
ANION GAP SERPL CALC-SCNC: 10 MMOL/L (ref 3–18)
AST SERPL-CCNC: 18 U/L (ref 10–38)
BILIRUB SERPL-MCNC: 0.3 MG/DL (ref 0.2–1)
BUN SERPL-MCNC: 11 MG/DL (ref 6–23)
BUN/CREAT SERPL: 13 (ref 12–20)
CALCIUM SERPL-MCNC: 9.3 MG/DL (ref 8.5–10.1)
CHLORIDE SERPL-SCNC: 104 MMOL/L (ref 98–107)
CHOLEST SERPL-MCNC: 94 MG/DL
CO2 SERPL-SCNC: 26 MMOL/L (ref 21–32)
CREAT SERPL-MCNC: 0.79 MG/DL (ref 0.6–1.3)
EST. AVERAGE GLUCOSE BLD GHB EST-MCNC: 214 MG/DL
GLOBULIN SER CALC-MCNC: 2.6 G/DL (ref 2–4)
GLUCOSE SERPL-MCNC: 227 MG/DL (ref 74–108)
HBA1C MFR BLD: 9.1 % (ref 4.2–5.6)
HDLC SERPL-MCNC: 36 MG/DL (ref 40–60)
HDLC SERPL: 2.6 (ref 0–5)
LDLC SERPL CALC-MCNC: 30 MG/DL (ref 0–100)
POTASSIUM SERPL-SCNC: 4.4 MMOL/L (ref 3.5–5.5)
PROT SERPL-MCNC: 6.1 G/DL (ref 6.4–8.2)
SODIUM SERPL-SCNC: 140 MMOL/L (ref 136–145)
TRIGL SERPL-MCNC: 141 MG/DL (ref 0–150)
TSH, 3RD GENERATION: 1.34 UIU/ML (ref 0.27–4.2)
VLDLC SERPL CALC-MCNC: 28 MG/DL

## 2025-05-05 PROCEDURE — 84443 ASSAY THYROID STIM HORMONE: CPT

## 2025-05-05 PROCEDURE — 36415 COLL VENOUS BLD VENIPUNCTURE: CPT

## 2025-05-05 PROCEDURE — 83036 HEMOGLOBIN GLYCOSYLATED A1C: CPT

## 2025-05-05 PROCEDURE — 82306 VITAMIN D 25 HYDROXY: CPT

## 2025-05-05 PROCEDURE — 80053 COMPREHEN METABOLIC PANEL: CPT

## 2025-05-05 PROCEDURE — 80061 LIPID PANEL: CPT

## 2025-05-08 ENCOUNTER — OFFICE VISIT (OUTPATIENT)
Facility: CLINIC | Age: 70
End: 2025-05-08

## 2025-05-08 VITALS
HEART RATE: 69 BPM | TEMPERATURE: 97.7 F | BODY MASS INDEX: 27.06 KG/M2 | HEIGHT: 72 IN | SYSTOLIC BLOOD PRESSURE: 103 MMHG | RESPIRATION RATE: 16 BRPM | WEIGHT: 199.8 LBS | OXYGEN SATURATION: 95 % | DIASTOLIC BLOOD PRESSURE: 67 MMHG

## 2025-05-08 DIAGNOSIS — M62.838 MUSCLE SPASM: ICD-10-CM

## 2025-05-08 DIAGNOSIS — M25.59 PAIN IN OTHER JOINT: ICD-10-CM

## 2025-05-08 DIAGNOSIS — E55.9 VITAMIN D DEFICIENCY: ICD-10-CM

## 2025-05-08 DIAGNOSIS — M16.11 PRIMARY OSTEOARTHRITIS OF RIGHT HIP: ICD-10-CM

## 2025-05-08 DIAGNOSIS — E11.65 TYPE 2 DIABETES MELLITUS WITH HYPERGLYCEMIA, WITHOUT LONG-TERM CURRENT USE OF INSULIN (HCC): Primary | ICD-10-CM

## 2025-05-08 RX ORDER — DIAZEPAM 5 MG/1
5 TABLET ORAL EVERY 12 HOURS PRN
Qty: 30 TABLET | Refills: 1 | Status: SHIPPED | OUTPATIENT
Start: 2025-05-08 | End: 2025-06-07

## 2025-05-08 ASSESSMENT — ANXIETY QUESTIONNAIRES
4. TROUBLE RELAXING: NOT AT ALL
6. BECOMING EASILY ANNOYED OR IRRITABLE: NOT AT ALL
GAD7 TOTAL SCORE: 0
5. BEING SO RESTLESS THAT IT IS HARD TO SIT STILL: NOT AT ALL
3. WORRYING TOO MUCH ABOUT DIFFERENT THINGS: NOT AT ALL
1. FEELING NERVOUS, ANXIOUS, OR ON EDGE: NOT AT ALL
7. FEELING AFRAID AS IF SOMETHING AWFUL MIGHT HAPPEN: NOT AT ALL
IF YOU CHECKED OFF ANY PROBLEMS ON THIS QUESTIONNAIRE, HOW DIFFICULT HAVE THESE PROBLEMS MADE IT FOR YOU TO DO YOUR WORK, TAKE CARE OF THINGS AT HOME, OR GET ALONG WITH OTHER PEOPLE: NOT DIFFICULT AT ALL
2. NOT BEING ABLE TO STOP OR CONTROL WORRYING: NOT AT ALL

## 2025-05-08 ASSESSMENT — PATIENT HEALTH QUESTIONNAIRE - PHQ9
SUM OF ALL RESPONSES TO PHQ QUESTIONS 1-9: 0
SUM OF ALL RESPONSES TO PHQ QUESTIONS 1-9: 0
1. LITTLE INTEREST OR PLEASURE IN DOING THINGS: NOT AT ALL
SUM OF ALL RESPONSES TO PHQ QUESTIONS 1-9: 0
2. FEELING DOWN, DEPRESSED OR HOPELESS: NOT AT ALL
SUM OF ALL RESPONSES TO PHQ QUESTIONS 1-9: 0

## 2025-05-08 NOTE — PROGRESS NOTES
Have you been to the ER, urgent care clinic since your last visit?  Hospitalized since your last visit?   NO    Have you seen or consulted any other health care providers outside our system since your last visit?   NO    “Have you had a diabetic eye exam?”    YES - Where: 11/2024- Ophthalmology- Dr. ROSY Keane Nurse/CMA to request most recent records if not in the chart     No diabetic eye exam on file        
Blood work will be conducted in approximately 2 months to monitor response to the adjusted medication regimen.    3. Tick bites.  - Experienced seven tick bites in the inguinal area over the past month, resulting in itching and a rash.  - Physical exam findings include the presence of a rash but no bull's eye pattern.  - A Lyme titer will be included in the upcoming blood work to assess for any potential infection.  - Follow-up in 4 months to review blood work results and overall condition.      Replace vitamin D  Results  Labs   - A1c: 9%    Imaging   - CT scan of the right lower extremity and hip: 2022, Mild arthritic changes and a bit of loss of joint space in the right hip      Return in about 4 months (around 9/8/2025) for diabetes.       Subjective   History of Present Illness  The patient presents for evaluation of hip pain, diabetes mellitus, and tick bites.    He reports experiencing generalized body pain, with a particular emphasis on his hips. He has attempted physical therapy but has been unable to maintain consistency due to frequent travel. Home exercises have also been challenging. His knees appear to be improving, which he attributes to collagen supplementation. He does not currently have an orthopedic specialist. He experiences difficulty in rising from a seated position, often requiring support and time to regain balance. Prolonged sitting results in significant discomfort upon standing. He expresses dissatisfaction with Dr. Locke's management of his shoulder condition, which primarily involved physical therapy. He recalls a previous incident involving a tractor that resulted in a fall and subsequent hip x-ray. He describes a sensation of numbness and soreness in his hip socket when sitting. He also reports a lack of muscle support and stiffness in his legs, particularly when exiting a vehicle. He is managing his back pain and arthritis effectively. He occasionally takes Naprosyn for pain

## 2025-05-15 ENCOUNTER — OFFICE VISIT (OUTPATIENT)
Age: 70
End: 2025-05-15

## 2025-05-15 VITALS — BODY MASS INDEX: 26.82 KG/M2 | WEIGHT: 198 LBS | HEIGHT: 72 IN

## 2025-05-15 DIAGNOSIS — I73.9 PAD (PERIPHERAL ARTERY DISEASE): Primary | ICD-10-CM

## 2025-05-15 DIAGNOSIS — M17.11 PRIMARY OSTEOARTHRITIS OF RIGHT KNEE: ICD-10-CM

## 2025-05-15 DIAGNOSIS — M16.12 PRIMARY OSTEOARTHRITIS OF LEFT HIP: ICD-10-CM

## 2025-05-15 DIAGNOSIS — M16.11 PRIMARY OSTEOARTHRITIS OF RIGHT HIP: ICD-10-CM

## 2025-05-15 RX ORDER — BETAMETHASONE SODIUM PHOSPHATE AND BETAMETHASONE ACETATE 3; 3 MG/ML; MG/ML
6 INJECTION, SUSPENSION INTRA-ARTICULAR; INTRALESIONAL; INTRAMUSCULAR; SOFT TISSUE ONCE
Status: COMPLETED | OUTPATIENT
Start: 2025-05-15 | End: 2025-05-15

## 2025-05-15 RX ADMIN — BETAMETHASONE SODIUM PHOSPHATE AND BETAMETHASONE ACETATE 6 MG: 3; 3 INJECTION, SUSPENSION INTRA-ARTICULAR; INTRALESIONAL; INTRAMUSCULAR; SOFT TISSUE at 15:31

## 2025-05-15 NOTE — PROGRESS NOTES
KIDNEY REMOVAL Left 6/19/15    Partial, Dr. Yañez, [facility]    NEPHRECTOMY Left 2015    particial nephrectomy     ORTHOPEDIC SURGERY  2005    left rotator cuff    OTHER SURGICAL HISTORY  2007    hydrocele    SHOULDER SURGERY  2001?    TONSILLECTOMY  1978       Social History     Socioeconomic History    Marital status:      Spouse name: Not on file    Number of children: Not on file    Years of education: Not on file    Highest education level: Not on file   Occupational History    Not on file   Tobacco Use    Smoking status: Former     Current packs/day: 0.00     Average packs/day: 1 pack/day for 40.0 years (40.0 ttl pk-yrs)     Types: Cigarettes     Start date: 2/14/1970     Quit date: 2/14/2010     Years since quitting: 15.2    Smokeless tobacco: Never    Tobacco comments:     Age 16 to 57yrs old    Vaping Use    Vaping status: Never Used   Substance and Sexual Activity    Alcohol use: Yes     Alcohol/week: 4.0 standard drinks of alcohol     Types: 4 Shots of liquor per week     Comment: Occasional drinking    Drug use: No    Sexual activity: Yes     Partners: Female   Other Topics Concern    Not on file   Social History Narrative    Not on file     Social Drivers of Health     Financial Resource Strain: Low Risk  (10/22/2024)    Overall Financial Resource Strain (CARDIA)     Difficulty of Paying Living Expenses: Not hard at all   Food Insecurity: No Food Insecurity (2/5/2025)    Hunger Vital Sign     Worried About Running Out of Food in the Last Year: Never true     Ran Out of Food in the Last Year: Never true   Transportation Needs: No Transportation Needs (2/5/2025)    PRAPARE - Transportation     Lack of Transportation (Medical): No     Lack of Transportation (Non-Medical): No   Physical Activity: Insufficiently Active (10/22/2024)    Exercise Vital Sign     Days of Exercise per Week: 1 day     Minutes of Exercise per Session: 60 min   Stress: Not on file   Social Connections: Not on file   Intimate

## 2025-06-04 ENCOUNTER — HOSPITAL ENCOUNTER (OUTPATIENT)
Age: 70
Discharge: HOME | End: 2025-06-04
Payer: MEDICARE

## 2025-06-04 DIAGNOSIS — E78.2 MIXED HYPERLIPIDEMIA: ICD-10-CM

## 2025-06-04 LAB
CHOLEST SERPL-MCNC: 105 MG/DL
HDLC SERPL-MCNC: 34 MG/DL (ref 40–60)
HDLC SERPL: 3.1 (ref 0–5)
LDLC SERPL CALC-MCNC: 27 MG/DL (ref 0–100)
TRIGL SERPL-MCNC: 221 MG/DL (ref 0–150)
VLDLC SERPL CALC-MCNC: 44 MG/DL

## 2025-06-04 PROCEDURE — 36415 COLL VENOUS BLD VENIPUNCTURE: CPT

## 2025-06-04 PROCEDURE — 80061 LIPID PANEL: CPT

## 2025-06-06 ENCOUNTER — OFFICE VISIT (OUTPATIENT)
Age: 70
End: 2025-06-06
Payer: MEDICARE

## 2025-06-06 VITALS
OXYGEN SATURATION: 97 % | HEART RATE: 67 BPM | WEIGHT: 193 LBS | SYSTOLIC BLOOD PRESSURE: 112 MMHG | DIASTOLIC BLOOD PRESSURE: 65 MMHG | HEIGHT: 72 IN | BODY MASS INDEX: 26.14 KG/M2

## 2025-06-06 DIAGNOSIS — E11.9 TYPE 2 DIABETES MELLITUS WITHOUT COMPLICATION, WITHOUT LONG-TERM CURRENT USE OF INSULIN (HCC): ICD-10-CM

## 2025-06-06 DIAGNOSIS — I25.118 ATHEROSCLEROSIS OF NATIVE CORONARY ARTERY OF NATIVE HEART WITH STABLE ANGINA PECTORIS: Primary | ICD-10-CM

## 2025-06-06 DIAGNOSIS — Z95.5 HISTORY OF CORONARY ARTERY STENT PLACEMENT: ICD-10-CM

## 2025-06-06 DIAGNOSIS — Z85.528 HISTORY OF RENAL CELL CANCER: ICD-10-CM

## 2025-06-06 DIAGNOSIS — E78.2 MIXED HYPERLIPIDEMIA: ICD-10-CM

## 2025-06-06 PROCEDURE — 1159F MED LIST DOCD IN RCRD: CPT | Performed by: INTERNAL MEDICINE

## 2025-06-06 PROCEDURE — 99214 OFFICE O/P EST MOD 30 MIN: CPT | Performed by: INTERNAL MEDICINE

## 2025-06-06 PROCEDURE — 1160F RVW MEDS BY RX/DR IN RCRD: CPT | Performed by: INTERNAL MEDICINE

## 2025-06-06 PROCEDURE — 3046F HEMOGLOBIN A1C LEVEL >9.0%: CPT | Performed by: INTERNAL MEDICINE

## 2025-06-06 PROCEDURE — 1126F AMNT PAIN NOTED NONE PRSNT: CPT | Performed by: INTERNAL MEDICINE

## 2025-06-06 PROCEDURE — 1123F ACP DISCUSS/DSCN MKR DOCD: CPT | Performed by: INTERNAL MEDICINE

## 2025-06-06 PROCEDURE — 2022F DILAT RTA XM EVC RTNOPTHY: CPT | Performed by: INTERNAL MEDICINE

## 2025-06-06 PROCEDURE — G8417 CALC BMI ABV UP PARAM F/U: HCPCS | Performed by: INTERNAL MEDICINE

## 2025-06-06 PROCEDURE — 3017F COLORECTAL CA SCREEN DOC REV: CPT | Performed by: INTERNAL MEDICINE

## 2025-06-06 PROCEDURE — 1036F TOBACCO NON-USER: CPT | Performed by: INTERNAL MEDICINE

## 2025-06-06 PROCEDURE — 93000 ELECTROCARDIOGRAM COMPLETE: CPT | Performed by: INTERNAL MEDICINE

## 2025-06-06 PROCEDURE — G8427 DOCREV CUR MEDS BY ELIG CLIN: HCPCS | Performed by: INTERNAL MEDICINE

## 2025-06-06 ASSESSMENT — PATIENT HEALTH QUESTIONNAIRE - PHQ9
SUM OF ALL RESPONSES TO PHQ QUESTIONS 1-9: 0
1. LITTLE INTEREST OR PLEASURE IN DOING THINGS: NOT AT ALL
2. FEELING DOWN, DEPRESSED OR HOPELESS: NOT AT ALL

## 2025-06-06 ASSESSMENT — ENCOUNTER SYMPTOMS
GASTROINTESTINAL NEGATIVE: 1
RESPIRATORY NEGATIVE: 1
EYES NEGATIVE: 1

## 2025-06-06 NOTE — PROGRESS NOTES
1. Have you been to the ER, urgent care clinic since your last visit?  Hospitalized since your last visit?  no      2.  Where do you normally have your labs drawn?   [facility]    3. Do you need any refills today?   no    4. Which local pharmacy do you use (enter pharmacy)?   [facility] on [address]    5. Which mail order pharmacy do you use (enter pharmacy)?   [facility]     6. Are you here for surgical clearance and if so who will be doing your     procedure/surgery (care team)?   Consult with vascular surgeons      
rosuvastatin and follow the labs.  Will likely need a fibrate also.  Healthy diet discussed again and reinforced.  Recommended to lose 10 more pounds.    6/5/2023 CAD stable.  Patient has diffuse myalgias and arthralgias which were present before he started statins.  I recommended a rheumatology opinion but he did get 1 couple years ago.  He has reduced his intake of beef.  Recent lipids were excellent.  Brilinta can be discontinued now.  Healthy lifestyle discussed including diet but he does not want to stop his beef and pork.  Continue rest of the medications and follow    6/5/2024  Plan for medicines : As CAD is stable without any significant angina.  Blood pressure is controlled.  Diabetes is not controlled and he is working on it.  Lipids not at goal as triglycerides are elevated.  He refuses to add another medication.  Has arthralgias for which a rheumatology opinion recommended.  Continue rosuvastatin.  Total CPK was normal recently.  Continue lisinopril metoprolol and aspirin.  Exercise Plan: Exercise regularly 30 to 40 minutes a day.  Diet plan: Mediterranean diet guidelines reinforced.    Diagnoses and all orders for this visit:    Atherosclerosis of native coronary artery of native heart with stable angina pectoris  -     EKG 12 Lead    Mixed hyperlipidemia    History of coronary artery stent placement    Type 2 diabetes mellitus without complication, without long-term current use of insulin (HCC)    History of renal cell cancer          See patient instructions also for other medical advice given    There are no discontinued medications.    Follow-up and Dispositions    Return in about 1 year (around 6/6/2026), or if symptoms worsen or fail to improve.           Return to ER if any significant CP not relieved by s/l NTG, severe SOB, severe palpitations, loss of consciousness    6/6/2025  Plan for medicines : Continue current cardiac medications.  CAD is stable.  2 episodes of chest discomfort may or may

## 2025-07-03 DIAGNOSIS — M16.11 PRIMARY OSTEOARTHRITIS OF RIGHT HIP: ICD-10-CM

## 2025-07-03 DIAGNOSIS — M16.12 PRIMARY OSTEOARTHRITIS OF LEFT HIP: ICD-10-CM

## 2025-07-04 RX ORDER — METOPROLOL TARTRATE 25 MG/1
12.5 TABLET, FILM COATED ORAL 2 TIMES DAILY
Qty: 90 TABLET | Refills: 3 | Status: SHIPPED | OUTPATIENT
Start: 2025-07-04

## 2025-07-17 ENCOUNTER — HOSPITAL ENCOUNTER (OUTPATIENT)
Facility: HOSPITAL | Age: 70
Setting detail: SPECIMEN
Discharge: HOME | End: 2025-07-20
Payer: MEDICARE

## 2025-07-17 DIAGNOSIS — E55.9 VITAMIN D DEFICIENCY: ICD-10-CM

## 2025-07-17 DIAGNOSIS — E11.65 TYPE 2 DIABETES MELLITUS WITH HYPERGLYCEMIA, WITHOUT LONG-TERM CURRENT USE OF INSULIN (HCC): ICD-10-CM

## 2025-07-17 LAB
25(OH)D3 SERPL-MCNC: 25.4 NG/ML (ref 30–100)
ALBUMIN SERPL-MCNC: 3.9 G/DL (ref 3.4–5)
ALBUMIN/GLOB SERPL: 1.4 (ref 0.8–1.7)
ALP SERPL-CCNC: 58 U/L (ref 45–117)
ALT SERPL-CCNC: 17 U/L (ref 10–50)
ANION GAP SERPL CALC-SCNC: 12 MMOL/L (ref 3–18)
AST SERPL-CCNC: 17 U/L (ref 10–38)
BILIRUB SERPL-MCNC: 0.5 MG/DL (ref 0.2–1)
BUN SERPL-MCNC: 14 MG/DL (ref 6–23)
BUN/CREAT SERPL: 14 (ref 12–20)
CALCIUM SERPL-MCNC: 10 MG/DL (ref 8.5–10.1)
CHLORIDE SERPL-SCNC: 103 MMOL/L (ref 98–107)
CHOLEST SERPL-MCNC: 99 MG/DL
CO2 SERPL-SCNC: 25 MMOL/L (ref 21–32)
CREAT SERPL-MCNC: 0.96 MG/DL (ref 0.6–1.3)
EST. AVERAGE GLUCOSE BLD GHB EST-MCNC: 193 MG/DL
GLOBULIN SER CALC-MCNC: 2.8 G/DL (ref 2–4)
GLUCOSE SERPL-MCNC: 180 MG/DL (ref 74–108)
HBA1C MFR BLD: 8.4 % (ref 4.2–5.6)
HDLC SERPL-MCNC: 38 MG/DL (ref 40–60)
HDLC SERPL: 2.6 (ref 0–5)
LDLC SERPL CALC-MCNC: 24 MG/DL (ref 0–100)
POTASSIUM SERPL-SCNC: 4.8 MMOL/L (ref 3.5–5.5)
PROT SERPL-MCNC: 6.7 G/DL (ref 6.4–8.2)
SODIUM SERPL-SCNC: 141 MMOL/L (ref 136–145)
TRIGL SERPL-MCNC: 182 MG/DL (ref 0–150)
TSH, 3RD GENERATION: 1.07 UIU/ML (ref 0.27–4.2)
VLDLC SERPL CALC-MCNC: 36 MG/DL

## 2025-07-17 PROCEDURE — 80061 LIPID PANEL: CPT

## 2025-07-17 PROCEDURE — 80053 COMPREHEN METABOLIC PANEL: CPT

## 2025-07-17 PROCEDURE — 36415 COLL VENOUS BLD VENIPUNCTURE: CPT

## 2025-07-17 PROCEDURE — 87476 LYME DIS DNA AMP PROBE: CPT

## 2025-07-17 PROCEDURE — 84443 ASSAY THYROID STIM HORMONE: CPT

## 2025-07-17 PROCEDURE — 83036 HEMOGLOBIN GLYCOSYLATED A1C: CPT

## 2025-07-17 PROCEDURE — 82306 VITAMIN D 25 HYDROXY: CPT

## 2025-07-19 LAB
B BURGDOR DNA SPEC QL NAA+PROBE: NEGATIVE
SPECIMEN SOURCE: NORMAL

## 2025-07-21 DIAGNOSIS — E11.65 TYPE 2 DIABETES MELLITUS WITH HYPERGLYCEMIA, WITHOUT LONG-TERM CURRENT USE OF INSULIN (HCC): ICD-10-CM

## 2025-07-23 NOTE — TELEPHONE ENCOUNTER
Last Visit: 05/08/2025   Next Appointment: 09/08/2025      Requested Prescriptions     Pending Prescriptions Disp Refills    TRULICITY 3 MG/0.5ML SOAJ [Pharmacy Med Name: TRULICITY SD PEN 0.5ML 4'S 3MG] 6 mL 3     Sig: INJECT 3 MG UNDER THE SKIN EVERY 7 DAYS

## 2025-07-26 RX ORDER — DULAGLUTIDE 3 MG/.5ML
INJECTION, SOLUTION SUBCUTANEOUS
Qty: 6 ML | Refills: 3 | Status: SHIPPED | OUTPATIENT
Start: 2025-07-26

## 2025-09-04 RX ORDER — ROSUVASTATIN CALCIUM 20 MG/1
20 TABLET, COATED ORAL NIGHTLY
Qty: 90 TABLET | Refills: 3 | Status: SHIPPED | OUTPATIENT
Start: 2025-09-04

## (undated) DEVICE — SET FLD ADMIN 3 W STPCOCK FIX FEM L BOR 1IN

## (undated) DEVICE — RUNTHROUGH NS EXTRA FLOPPY PTCA GUIDEWIRE: Brand: RUNTHROUGH

## (undated) DEVICE — CATHETER GUID 6FR L100CM PTFE XBLAD4 TRUELUMEN HYBRID BRAID

## (undated) DEVICE — DRAPE,ANGIO,BRACH,STERILE,38X44: Brand: MEDLINE

## (undated) DEVICE — PACK PROCEDURE SURG VASC CATH 161 MMC LF

## (undated) DEVICE — GUIDEWIRE VASC L260CM DIA0035IN TIP L3MM PTFE J STD TAPR FIX

## (undated) DEVICE — PRESSURE MONITORING SET: Brand: TRUWAVE

## (undated) DEVICE — DEVICE INFL W ACCS + HEMSTAS VLV INSRT TOOL AND TORQ BASIX

## (undated) DEVICE — Z DUPLICATE USE 2103554 VALVE HEMOSTATIC BLEEDBK CTRL COPILOT

## (undated) DEVICE — GLIDESHEATH SLENDER STAINLESS STEEL KIT: Brand: GLIDESHEATH SLENDER

## (undated) DEVICE — ANGIOGRAPHY KIT CUST VASC

## (undated) DEVICE — PROCEDURE KIT FLUID MGMT 10 FR CUST MAINFOLD

## (undated) DEVICE — BAND RADIAL COMPR ARTERY 24CM -- REG BX/10

## (undated) DEVICE — CATH BLLN ANGIO 2.75X12MM NC EUPHORIA RX

## (undated) DEVICE — CATH BLLN DIL 2.5 X12MM RX -- EUPHORA

## (undated) DEVICE — RADIFOCUS OPTITORQUE ANGIOGRAPHIC CATHETER: Brand: OPTITORQUE